# Patient Record
Sex: FEMALE | Race: WHITE | Employment: OTHER | URBAN - METROPOLITAN AREA
[De-identification: names, ages, dates, MRNs, and addresses within clinical notes are randomized per-mention and may not be internally consistent; named-entity substitution may affect disease eponyms.]

---

## 2017-01-06 RX ORDER — GALANTAMINE HYDROBROMIDE 8 MG/1
TABLET, FILM COATED ORAL
Qty: 180 TABLET | Refills: 1 | Status: SHIPPED | OUTPATIENT
Start: 2017-01-06 | End: 2017-07-19

## 2017-01-06 NOTE — TELEPHONE ENCOUNTER
LOV 5/2/16  LL 4/21/16  LAST RX 5/9/16   PLACED ORDER FOR #180 + 1 PER PROTOCOL.    PT WILL BE DUE FOR [address] IN MAY 2017

## 2017-01-17 RX ORDER — ERGOCALCIFEROL 1.25 MG/1
CAPSULE ORAL
Qty: 20 CAPSULE | Refills: 0 | Status: SHIPPED | OUTPATIENT
Start: 2017-01-17 | End: 2017-06-29

## 2017-01-17 NOTE — TELEPHONE ENCOUNTER
LOV 5/2/16  LL 4/21/16 Vit D  Last Rx 9/29/16 #12 + 0   Placed order for #20 + 0 to get pt to due date for next labs.

## 2017-01-20 ENCOUNTER — MED REC SCAN ONLY (OUTPATIENT)
Dept: INTERNAL MEDICINE CLINIC | Facility: CLINIC | Age: 82
End: 2017-01-20

## 2017-04-05 ENCOUNTER — TELEPHONE (OUTPATIENT)
Dept: INTERNAL MEDICINE CLINIC | Facility: CLINIC | Age: 82
End: 2017-04-05

## 2017-05-05 ENCOUNTER — TELEPHONE (OUTPATIENT)
Dept: INTERNAL MEDICINE CLINIC | Facility: CLINIC | Age: 82
End: 2017-05-05

## 2017-06-30 RX ORDER — ERGOCALCIFEROL 1.25 MG/1
CAPSULE ORAL
Qty: 20 CAPSULE | Refills: 0 | Status: SHIPPED | OUTPATIENT
Start: 2017-06-30 | End: 2017-08-11

## 2017-07-19 ENCOUNTER — NURSE ONLY (OUTPATIENT)
Dept: INTERNAL MEDICINE CLINIC | Facility: CLINIC | Age: 82
End: 2017-07-19

## 2017-07-19 ENCOUNTER — APPOINTMENT (OUTPATIENT)
Dept: GENERAL RADIOLOGY | Facility: HOSPITAL | Age: 82
End: 2017-07-19
Attending: EMERGENCY MEDICINE
Payer: MEDICARE

## 2017-07-19 ENCOUNTER — HOSPITAL ENCOUNTER (EMERGENCY)
Facility: HOSPITAL | Age: 82
Discharge: HOME | End: 2017-07-19
Attending: EMERGENCY MEDICINE
Payer: MEDICARE

## 2017-07-19 ENCOUNTER — OFFICE VISIT (OUTPATIENT)
Dept: INTERNAL MEDICINE CLINIC | Facility: CLINIC | Age: 82
End: 2017-07-19

## 2017-07-19 VITALS
RESPIRATION RATE: 14 BRPM | SYSTOLIC BLOOD PRESSURE: 148 MMHG | OXYGEN SATURATION: 98 % | WEIGHT: 156 LBS | BODY MASS INDEX: 27 KG/M2 | HEART RATE: 76 BPM | DIASTOLIC BLOOD PRESSURE: 80 MMHG | TEMPERATURE: 98 F

## 2017-07-19 VITALS
BODY MASS INDEX: 26.98 KG/M2 | OXYGEN SATURATION: 96 % | RESPIRATION RATE: 18 BRPM | HEART RATE: 64 BPM | WEIGHT: 158 LBS | TEMPERATURE: 97 F | HEIGHT: 64 IN | DIASTOLIC BLOOD PRESSURE: 64 MMHG | SYSTOLIC BLOOD PRESSURE: 118 MMHG

## 2017-07-19 DIAGNOSIS — Z00.01 ENCOUNTER FOR GENERAL ADULT MEDICAL EXAMINATION WITH ABNORMAL FINDINGS: Primary | ICD-10-CM

## 2017-07-19 DIAGNOSIS — R55 SYNCOPE, VASOVAGAL: Primary | ICD-10-CM

## 2017-07-19 DIAGNOSIS — Z00.00 LABORATORY EXAMINATION ORDERED AS PART OF A ROUTINE GENERAL MEDICAL EXAMINATION: Primary | ICD-10-CM

## 2017-07-19 PROBLEM — S82.90XA LEG FRACTURE: Status: RESOLVED | Noted: 2017-07-19 | Resolved: 2017-07-19

## 2017-07-19 LAB
ALBUMIN SERPL-MCNC: 3.4 G/DL (ref 3.5–4.8)
ALP LIVER SERPL-CCNC: 74 U/L (ref 55–142)
ALT SERPL-CCNC: 16 U/L (ref 14–54)
AST SERPL-CCNC: 16 U/L (ref 15–41)
ATRIAL RATE: 61 BPM
BASOPHILS # BLD AUTO: 0.04 X10(3) UL (ref 0–0.1)
BASOPHILS NFR BLD AUTO: 0.7 %
BILIRUB SERPL-MCNC: 0.5 MG/DL (ref 0.1–2)
BILIRUB UR QL STRIP.AUTO: NEGATIVE
BUN BLD-MCNC: 6 MG/DL (ref 8–20)
CALCIUM BLD-MCNC: 8.5 MG/DL (ref 8.3–10.3)
CHLORIDE: 106 MMOL/L (ref 101–111)
CLARITY UR REFRACT.AUTO: CLEAR
CO2: 21 MMOL/L (ref 22–32)
COLOR UR AUTO: YELLOW
CREAT BLD-MCNC: 0.69 MG/DL (ref 0.55–1.02)
EOSINOPHIL # BLD AUTO: 0.11 X10(3) UL (ref 0–0.3)
EOSINOPHIL NFR BLD AUTO: 1.8 %
ERYTHROCYTE [DISTWIDTH] IN BLOOD BY AUTOMATED COUNT: 13.2 % (ref 11.5–16)
GLUCOSE BLD-MCNC: 130 MG/DL (ref 70–99)
GLUCOSE UR STRIP.AUTO-MCNC: NEGATIVE MG/DL
HCT VFR BLD AUTO: 40 % (ref 34–50)
HGB BLD-MCNC: 13.3 G/DL (ref 12–16)
IMMATURE GRANULOCYTE COUNT: 0.02 X10(3) UL (ref 0–1)
IMMATURE GRANULOCYTE RATIO %: 0.3 %
KETONES UR STRIP.AUTO-MCNC: NEGATIVE MG/DL
LYMPHOCYTES # BLD AUTO: 1.84 X10(3) UL (ref 0.9–4)
LYMPHOCYTES NFR BLD AUTO: 30.9 %
M PROTEIN MFR SERPL ELPH: 6.1 G/DL (ref 6.1–8.3)
MCH RBC QN AUTO: 29.9 PG (ref 27–33.2)
MCHC RBC AUTO-ENTMCNC: 33.3 G/DL (ref 31–37)
MCV RBC AUTO: 89.9 FL (ref 81–100)
MONOCYTES # BLD AUTO: 0.49 X10(3) UL (ref 0.1–0.6)
MONOCYTES NFR BLD AUTO: 8.2 %
NEUTROPHIL ABS PRELIM: 3.46 X10 (3) UL (ref 1.3–6.7)
NEUTROPHILS # BLD AUTO: 3.46 X10(3) UL (ref 1.3–6.7)
NEUTROPHILS NFR BLD AUTO: 58.1 %
NITRITE UR QL STRIP.AUTO: NEGATIVE
P AXIS: 84 DEGREES
P-R INTERVAL: 260 MS
PH UR STRIP.AUTO: 7 [PH] (ref 4.5–8)
PLATELET # BLD AUTO: 273 10(3)UL (ref 150–450)
POTASSIUM SERPL-SCNC: 4 MMOL/L (ref 3.6–5.1)
PROT UR STRIP.AUTO-MCNC: NEGATIVE MG/DL
Q-T INTERVAL: 468 MS
QRS DURATION: 108 MS
QTC CALCULATION (BEZET): 471 MS
R AXIS: -33 DEGREES
RBC # BLD AUTO: 4.45 X10(6)UL (ref 3.8–5.1)
RBC UR QL AUTO: NEGATIVE
RED CELL DISTRIBUTION WIDTH-SD: 43 FL (ref 35.1–46.3)
SODIUM SERPL-SCNC: 138 MMOL/L (ref 136–144)
SP GR UR STRIP.AUTO: 1.01 (ref 1–1.03)
T AXIS: 118 DEGREES
TROPONIN: <0.046 NG/ML (ref ?–0.05)
UROBILINOGEN UR STRIP.AUTO-MCNC: <2 MG/DL
VENTRICULAR RATE: 61 BPM
WBC # BLD AUTO: 6 X10(3) UL (ref 4–13)

## 2017-07-19 PROCEDURE — 93000 ELECTROCARDIOGRAM COMPLETE: CPT | Performed by: INTERNAL MEDICINE

## 2017-07-19 PROCEDURE — 94010 BREATHING CAPACITY TEST: CPT | Performed by: INTERNAL MEDICINE

## 2017-07-19 PROCEDURE — 99285 EMERGENCY DEPT VISIT HI MDM: CPT

## 2017-07-19 PROCEDURE — 93005 ELECTROCARDIOGRAM TRACING: CPT

## 2017-07-19 PROCEDURE — 80053 COMPREHEN METABOLIC PANEL: CPT | Performed by: EMERGENCY MEDICINE

## 2017-07-19 PROCEDURE — G0439 PPPS, SUBSEQ VISIT: HCPCS | Performed by: INTERNAL MEDICINE

## 2017-07-19 PROCEDURE — 85025 COMPLETE CBC W/AUTO DIFF WBC: CPT | Performed by: EMERGENCY MEDICINE

## 2017-07-19 PROCEDURE — 71010 XR CHEST AP PORTABLE  (CPT=71010): CPT | Performed by: EMERGENCY MEDICINE

## 2017-07-19 PROCEDURE — 36415 COLL VENOUS BLD VENIPUNCTURE: CPT

## 2017-07-19 PROCEDURE — 84484 ASSAY OF TROPONIN QUANT: CPT | Performed by: EMERGENCY MEDICINE

## 2017-07-19 PROCEDURE — 92551 PURE TONE HEARING TEST AIR: CPT | Performed by: INTERNAL MEDICINE

## 2017-07-19 PROCEDURE — 81001 URINALYSIS AUTO W/SCOPE: CPT | Performed by: INTERNAL MEDICINE

## 2017-07-19 PROCEDURE — 93010 ELECTROCARDIOGRAM REPORT: CPT

## 2017-07-19 PROCEDURE — 99173 VISUAL ACUITY SCREEN: CPT | Performed by: INTERNAL MEDICINE

## 2017-07-19 RX ORDER — ASPIRIN 81 MG
TABLET,CHEWABLE ORAL
Qty: 42.5 G | Refills: 0 | Status: ON HOLD | COMMUNITY
Start: 2017-07-19 | End: 2018-09-07

## 2017-07-19 RX ORDER — GALANTAMINE HYDROBROMIDE 16 MG/1
16 CAPSULE, EXTENDED RELEASE ORAL
Qty: 90 CAPSULE | Refills: 3 | Status: SHIPPED | OUTPATIENT
Start: 2017-07-19 | End: 2017-08-11 | Stop reason: DRUGHIGH

## 2017-07-19 RX ORDER — MEMANTINE HYDROCHLORIDE 5 MG/1
5 TABLET ORAL 2 TIMES DAILY
Qty: 180 TABLET | Refills: 3 | Status: SHIPPED | OUTPATIENT
Start: 2017-07-19 | End: 2017-07-19

## 2017-07-19 RX ORDER — GALANTAMINE HYDROBROMIDE 24 MG/1
24 CAPSULE, EXTENDED RELEASE ORAL
Qty: 90 CAPSULE | Refills: 3 | Status: SHIPPED | OUTPATIENT
Start: 2017-07-19 | End: 2017-07-19

## 2017-07-19 NOTE — ED INITIAL ASSESSMENT (HPI)
PT TO ER VIA EMS FOR NEAR-SYNCOPAL EPISODE WHILE SHE WAS AT [facility] WITH HER DAUGHTER. UPON EMS ARRIVAL PT WAS DISORIENTED AND HYPOTENSIVE 64/ 48. 400 CC BOLUS WAS GIVEN EN-ROUTE. PT IS ALERT UPON ARRIVAL.

## 2017-07-19 NOTE — PROGRESS NOTES
*LOOKIN' BODY RESULTS    YES:       NO: X      REASON TEST NOT PERFORMED: WAIVED DUE TO BALANCE       HEIGHT:   WEIGHT: 156  _____________________________________________________________________________  *VENIPUNCTURE    YES:  X     NO:        REASON VENIP

## 2017-07-19 NOTE — PROGRESS NOTES
HPI:    Patient ID: Kit Ring is a 80year old female her for annual 7-19-17:    Here for annual, declining mental status. Review of Systems   HENT: Negative. Eyes: Positive for visual disturbance (cataracts).         Macular degeneration Comment:Elevated blood pressure  Ultram [Tramadol]           Comment:confusion            PHYSICAL EXAM:   Physical Exam   Constitutional: No distress.    HENT:   neg   Eyes: Conjunctivae are normal. Pupils are equal, round, and reactive resolved   • Macular degeneration    • OA (osteoarthritis) of knee 3/27/12    bilat, severe   • Onychomycosis 6/28/11   • Post herpetic neuralgia     left scapula   • Prediabetes    • Rotator cuff tear, right 1/2010    OR   • Temporal lobe seizure ([facility] [address] 8/ Size: adult    Pulse: 76    Resp: 14    Temp: 97.8 °F (36.6 °C)    TempSrc: Oral    SpO2: 98%    Weight: 156 lb      Body mass index is 26.78 kg/m².         Health Screens      Anxiety Index: No major anxiety evident  Depression Index: No major depression e changes may be due to myocardial ischemia     HEARING: Waived     SPIROMETRY:               ASSESSMENT/PLAN:   Encounter for general adult medical examination with abnormal findings  (primary encounter diagnosis)    See summary below:    Meds This Visit: surgery in 2010. Generalized arthritis, especially in the knees. Needs one person assist. Hold off on PT.     CONCLUSIONS:  Dayan Cheri you are in decent shape at age 80. Stable cardiovascular disease, Alzheimer dementia, and musculoskeletal problems.  Please co

## 2017-07-19 NOTE — ED NOTES
Pt reevaluated by er physician, pt and daughter both informed of pt's test report and plan of care. All verbalizing understanding.

## 2017-07-19 NOTE — ED PROVIDER NOTES
Patient Seen in: [facility] Emergency Department    History   Patient presents with:  Syncope (cardiovascular, neurologic)  Hypotension (cardiovascular)    Stated Complaint: HYPOTENSION/ NEAR-SYNCOPE    HPI    40-year-old white female who presents e B 12 deficiency    • Vitamin D deficiency        Past Surgical History:  : ANGIOPLASTY (CORONARY)      Comment: stent placement, 90% blockage  : COLONOSCOPY  approx 27 yrs old: D & C      Comment: missed   2015: HIP SURGERY      Comment: Device: None (Room air)    Current:/64   Pulse 64   Temp (!) 96.8 °F (36 °C) (Temporal)   Resp 18   Ht 162.6 cm (5' 4\")   Wt 71.7 kg   SpO2 96%   BMI 27.12 kg/m²         Physical Exam    Well-developed well-nourished female who is sitting on the gur T-wave changes are noted. Slightly prolonged QT interval was noted. Chest x-ray reveals:  FINDINGS:  Lung volumes are large. No consolidation. CP angles are sharp. There is mild cardiomegaly allowing for AP portable technique.  Tortuosity and ectas

## 2017-07-19 NOTE — ED NOTES
Pt's daughter states pt had a urinalysis testings done at the doctor's office this morning and hesitant to repeat the test.dr. Justin Ruvalcaba aware.

## 2017-07-24 ENCOUNTER — MED REC SCAN ONLY (OUTPATIENT)
Dept: INTERNAL MEDICINE CLINIC | Facility: CLINIC | Age: 82
End: 2017-07-24

## 2017-07-31 ENCOUNTER — TELEPHONE (OUTPATIENT)
Dept: INTERNAL MEDICINE CLINIC | Facility: CLINIC | Age: 82
End: 2017-07-31

## 2017-07-31 NOTE — TELEPHONE ENCOUNTER
Rec'd fax documentation from Dr. Andie Beauchamp stating that pt was admitted to hospital from ED for \"esophageal perforation, choking\". \"Emergent EGD by Dr. Michael Agustin completed. Pt intubated for procedure. Esophageal perforation discovered.  Dr. Michael Agustin called ENT

## 2017-08-02 ENCOUNTER — TELEPHONE (OUTPATIENT)
Dept: INTERNAL MEDICINE CLINIC | Facility: CLINIC | Age: 82
End: 2017-08-02

## 2017-08-09 ENCOUNTER — TELEPHONE (OUTPATIENT)
Dept: INTERNAL MEDICINE CLINIC | Facility: CLINIC | Age: 82
End: 2017-08-09

## 2017-08-11 ENCOUNTER — OFFICE VISIT (OUTPATIENT)
Dept: INTERNAL MEDICINE CLINIC | Facility: CLINIC | Age: 82
End: 2017-08-11

## 2017-08-11 VITALS
DIASTOLIC BLOOD PRESSURE: 85 MMHG | RESPIRATION RATE: 14 BRPM | TEMPERATURE: 98 F | OXYGEN SATURATION: 96 % | HEART RATE: 93 BPM | SYSTOLIC BLOOD PRESSURE: 135 MMHG

## 2017-08-11 DIAGNOSIS — Z87.09 HISTORY OF ACUTE RESPIRATORY FAILURE: ICD-10-CM

## 2017-08-11 DIAGNOSIS — F02.80 LATE ONSET ALZHEIMER'S DISEASE WITHOUT BEHAVIORAL DISTURBANCE (HCC): ICD-10-CM

## 2017-08-11 DIAGNOSIS — T18.108S: ICD-10-CM

## 2017-08-11 DIAGNOSIS — G30.1 LATE ONSET ALZHEIMER'S DISEASE WITHOUT BEHAVIORAL DISTURBANCE (HCC): ICD-10-CM

## 2017-08-11 DIAGNOSIS — K22.3 ESOPHAGEAL PERFORATION: Primary | ICD-10-CM

## 2017-08-11 PROCEDURE — 99214 OFFICE O/P EST MOD 30 MIN: CPT | Performed by: INTERNAL MEDICINE

## 2017-08-11 RX ORDER — ERGOCALCIFEROL (VITAMIN D2) 1250 MCG
50000 CAPSULE ORAL WEEKLY
COMMUNITY
Start: 2011-04-26 | End: 2018-01-08

## 2017-08-11 RX ORDER — GALANTAMINE HYDROBROMIDE 24 MG/1
CAPSULE, EXTENDED RELEASE ORAL
Refills: 3 | COMMUNITY
Start: 2017-07-28 | End: 2018-10-14

## 2017-08-11 RX ORDER — GALANTAMINE HYDROBROMIDE 12 MG/1
24 TABLET, FILM COATED ORAL
COMMUNITY
End: 2017-08-11 | Stop reason: DRUGHIGH

## 2017-08-13 PROBLEM — K22.3 ESOPHAGEAL PERFORATION: Status: ACTIVE | Noted: 2017-08-13

## 2017-08-13 PROBLEM — T18.108A ESOPHAGEAL FOREIGN BODY: Status: ACTIVE | Noted: 2017-08-13

## 2017-08-13 PROBLEM — Z87.09 HISTORY OF ACUTE RESPIRATORY FAILURE: Status: ACTIVE | Noted: 2017-08-13

## 2017-08-13 RX ORDER — OMEPRAZOLE 20 MG/1
20 CAPSULE, DELAYED RELEASE ORAL
Refills: 0 | COMMUNITY
Start: 2017-08-13 | End: 2017-12-04

## 2017-08-13 NOTE — PROGRESS NOTES
HPI:    Patient ID: Olga Ellison is a 80year old female baseline AD brought to [facility] ER 7-29 with esoph foreign body (food), EGD done esoph perf noted and sent to ICU on vent. Transferred to [facility] around 7/30 and discharged 8/6     See CC.  Plan rate, regular rhythm and normal heart sounds. Pulmonary/Chest: Breath sounds normal. No respiratory distress. Musculoskeletal: She exhibits no edema or tenderness. Neurological: She is alert.    Back to baseline confusional state-[employer]

## 2017-08-14 ENCOUNTER — MED REC SCAN ONLY (OUTPATIENT)
Dept: INTERNAL MEDICINE CLINIC | Facility: CLINIC | Age: 82
End: 2017-08-14

## 2017-11-02 ENCOUNTER — TELEPHONE (OUTPATIENT)
Dept: INTERNAL MEDICINE CLINIC | Facility: CLINIC | Age: 82
End: 2017-11-02

## 2017-11-02 NOTE — TELEPHONE ENCOUNTER
Call to dtr Miguelito Torres regarding medical clearance for Opth procedure in [facility] on 11/17/17 (30 min MAC lower lid senile entropion). Dtr does not think she can bring mom in prior to 11/17 for OV?      We have CPE and EKG from July, 2017 and hospital f/u from 8

## 2017-11-03 NOTE — TELEPHONE ENCOUNTER
Sent fax to Dr Remedios Rojas office telling them we have not rec'd a call yet and we need to know if July dates are acceptable to use for clearance. No one has called back.

## 2017-11-06 ENCOUNTER — HOSPITAL ENCOUNTER (OUTPATIENT)
Dept: GENERAL RADIOLOGY | Facility: HOSPITAL | Age: 82
Discharge: HOME | DRG: 516 | End: 2017-11-06
Attending: INTERNAL MEDICINE
Payer: MEDICARE

## 2017-11-06 ENCOUNTER — OFFICE VISIT (OUTPATIENT)
Dept: INTERNAL MEDICINE CLINIC | Facility: CLINIC | Age: 82
End: 2017-11-06

## 2017-11-06 ENCOUNTER — HOSPITAL ENCOUNTER (OUTPATIENT)
Dept: GENERAL RADIOLOGY | Facility: HOSPITAL | Age: 82
DRG: 516 | End: 2017-11-06
Attending: INTERNAL MEDICINE
Payer: MEDICARE

## 2017-11-06 DIAGNOSIS — R06.02 SOB (SHORTNESS OF BREATH): ICD-10-CM

## 2017-11-06 DIAGNOSIS — F02.80 LATE ONSET ALZHEIMER'S DISEASE WITHOUT BEHAVIORAL DISTURBANCE (HCC): ICD-10-CM

## 2017-11-06 DIAGNOSIS — M54.5 LOW BACK PAIN, UNSPECIFIED BACK PAIN LATERALITY, UNSPECIFIED CHRONICITY, WITH SCIATICA PRESENCE UNSPECIFIED: ICD-10-CM

## 2017-11-06 DIAGNOSIS — M54.5 LOW BACK PAIN, UNSPECIFIED BACK PAIN LATERALITY, UNSPECIFIED CHRONICITY, WITH SCIATICA PRESENCE UNSPECIFIED: Primary | ICD-10-CM

## 2017-11-06 DIAGNOSIS — G30.1 LATE ONSET ALZHEIMER'S DISEASE WITHOUT BEHAVIORAL DISTURBANCE (HCC): ICD-10-CM

## 2017-11-06 PROCEDURE — 71020 XR CHEST PA + LAT CHEST (CPT=71020): CPT | Performed by: INTERNAL MEDICINE

## 2017-11-06 PROCEDURE — 72114 X-RAY EXAM L-S SPINE BENDING: CPT | Performed by: INTERNAL MEDICINE

## 2017-11-06 PROCEDURE — 99214 OFFICE O/P EST MOD 30 MIN: CPT | Performed by: INTERNAL MEDICINE

## 2017-11-06 NOTE — TELEPHONE ENCOUNTER
Spoke to [facility] at Dr. Anahi House office. She says EKG from July will be fine, but H&P must be dated within 2 weeks of surgery scheduled for 11/17.   Informed Luz patient has appointment today and we will fax over [phone]

## 2017-11-07 ENCOUNTER — APPOINTMENT (OUTPATIENT)
Dept: CT IMAGING | Facility: HOSPITAL | Age: 82
DRG: 516 | End: 2017-11-07
Attending: INTERNAL MEDICINE
Payer: MEDICARE

## 2017-11-07 ENCOUNTER — TELEPHONE (OUTPATIENT)
Dept: INTERNAL MEDICINE CLINIC | Facility: CLINIC | Age: 82
End: 2017-11-07

## 2017-11-07 ENCOUNTER — HOSPITAL ENCOUNTER (INPATIENT)
Facility: HOSPITAL | Age: 82
LOS: 2 days | Discharge: HOME HEALTH | DRG: 516 | End: 2017-11-11
Attending: INTERNAL MEDICINE | Admitting: INTERNAL MEDICINE
Payer: MEDICARE

## 2017-11-07 VITALS
HEART RATE: 80 BPM | HEIGHT: 64 IN | WEIGHT: 155 LBS | SYSTOLIC BLOOD PRESSURE: 140 MMHG | BODY MASS INDEX: 26.46 KG/M2 | DIASTOLIC BLOOD PRESSURE: 80 MMHG | OXYGEN SATURATION: 98 % | RESPIRATION RATE: 14 BRPM | TEMPERATURE: 98 F

## 2017-11-07 PROBLEM — M54.50 ACUTE RIGHT-SIDED LOW BACK PAIN WITHOUT SCIATICA: Status: ACTIVE | Noted: 2017-11-07

## 2017-11-07 PROCEDURE — 99223 1ST HOSP IP/OBS HIGH 75: CPT | Performed by: INTERNAL MEDICINE

## 2017-11-07 PROCEDURE — 72131 CT LUMBAR SPINE W/O DYE: CPT | Performed by: INTERNAL MEDICINE

## 2017-11-07 RX ORDER — ASPIRIN 81 MG/1
81 TABLET, CHEWABLE ORAL DAILY
Status: DISCONTINUED | OUTPATIENT
Start: 2017-11-07 | End: 2017-11-11

## 2017-11-07 RX ORDER — PANTOPRAZOLE SODIUM 20 MG/1
20 TABLET, DELAYED RELEASE ORAL
Status: DISCONTINUED | OUTPATIENT
Start: 2017-11-08 | End: 2017-11-11

## 2017-11-07 RX ORDER — CALCIUM CARBONATE 200(500)MG
1 TABLET,CHEWABLE ORAL 2 TIMES DAILY
Qty: 100 TABLET | Refills: 0 | Status: ON HOLD | COMMUNITY
Start: 2017-11-07 | End: 2018-09-07

## 2017-11-07 RX ORDER — ALPRAZOLAM 0.25 MG/1
0.25 TABLET ORAL 4 TIMES DAILY PRN
Status: DISCONTINUED | OUTPATIENT
Start: 2017-11-07 | End: 2017-11-11

## 2017-11-07 RX ORDER — MELATONIN
1 DAILY
Qty: 30 TABLET | Refills: 0 | COMMUNITY
Start: 2017-11-07 | End: 2017-12-07

## 2017-11-07 RX ORDER — ACETAMINOPHEN 500 MG
500 TABLET ORAL
Status: DISCONTINUED | OUTPATIENT
Start: 2017-11-07 | End: 2017-11-11

## 2017-11-07 RX ORDER — AMLODIPINE BESYLATE 2.5 MG/1
2.5 TABLET ORAL DAILY
Status: DISCONTINUED | OUTPATIENT
Start: 2017-11-07 | End: 2017-11-11

## 2017-11-07 NOTE — PROGRESS NOTES
HPI:    Patient ID: Roxanna Cisneros is a 80year old female here for preop left lower eyelid AND acute LBP for 1 week, no fall or trauma    Preop left lower lid ok but LBP severe and may have to wait. Past hx OA and OP. AD complicates.         Review of S distressed (back pain). Eyes:   Eyes little red   Cardiovascular: Normal rate and regular rhythm. Pulmonary/Chest: Breath sounds normal. No respiratory distress. Musculoskeletal: She exhibits tenderness (lumbar).    kyphosis   Neurological:   Baselin

## 2017-11-07 NOTE — TELEPHONE ENCOUNTER
Donzetta Mortimer (son in law) says things are worse than they thought with Yoseph Harper. They can barely get her in the wheelchair to go to the bathroom, which is on a different level.   Family is thinking she may need to go to a nursing home until her back gets jackelin

## 2017-11-07 NOTE — TELEPHONE ENCOUNTER
Clayton Justen is calling to report Cody George cannot get out of bed, has been having accidents because she can't get up in time. Clayton Dukamala is thinking maybe she needs to have PT? Clayton Campuzano is worried because she doesn't think she'll be able to move her by herself.   Please ca

## 2017-11-07 NOTE — TELEPHONE ENCOUNTER
Per Dr Nemesio Valero - send Dr. Bandar Vickers office fax letting them know surgery needs to be cancelled at this time d/t pt hospital admit. Notified Dr Bandar Vickers office and also sending over fax.

## 2017-11-08 ENCOUNTER — APPOINTMENT (OUTPATIENT)
Dept: NUCLEAR MEDICINE | Facility: HOSPITAL | Age: 82
DRG: 516 | End: 2017-11-08
Attending: INTERNAL MEDICINE
Payer: MEDICARE

## 2017-11-08 PROBLEM — M54.50 LUMBAR PAIN: Status: ACTIVE | Noted: 2017-11-08

## 2017-11-08 PROCEDURE — 99232 SBSQ HOSP IP/OBS MODERATE 35: CPT | Performed by: INTERNAL MEDICINE

## 2017-11-08 PROCEDURE — 78306 BONE IMAGING WHOLE BODY: CPT | Performed by: INTERNAL MEDICINE

## 2017-11-08 PROCEDURE — 78320 NM BONE SPECT WITH CT (CPT=78306/78320/78399): CPT | Performed by: INTERNAL MEDICINE

## 2017-11-08 PROCEDURE — 78399 UNLISTED MUSCSKEL PX DX NUC: CPT | Performed by: INTERNAL MEDICINE

## 2017-11-08 NOTE — CM/SW NOTE
PT informed SW intern that pt needs 24 hour assist.  SW called pt's daughter Kuldeep Das and informed her. SW left private duty care giver list in pt's room with Gina's name on it.    Pako Chawla, 11/08/17, 1:35 PM

## 2017-11-08 NOTE — H&P
[facility]    PATIENT'S NAME: Ricky Michelle   ATTENDING PHYSICIAN: Gil Damon M.D.    PATIENT ACCOUNT#:   [de-identified]    LOCATION:  [address] [place] [address]  MEDICAL RECORD #:   [id number]       YOB: 1930  ADMISSION DATE:       11/07/2017 son.    SOCIAL HISTORY:  No alcohol or smoking. REVIEW OF SYSTEMS:  HEENT: Pleasantly confused from Alzheimer's. No behavioral problems. EYES: Cataracts and macular degeneration. RESPIRATORY: Some shortness of breath. Negative chest x-ray.   Astrid Fulton reveal severe osteoporosis and some possible old compression fractures. She has been vitamin D. We recently started magnesium and calcium. Family is opposed to any other aggressive treatment. PLAN:  Tylenol. Avoid Ultram and opiates.   Avoid NSAIDs f

## 2017-11-08 NOTE — PHYSICAL THERAPY NOTE
PHYSICAL THERAPY EVALUATION - INPATIENT     Room Number: 366/366-A  Evaluation Date: 11/8/2017  Type of Evaluation: Initial  Physician Order: PT Eval and Treat    Presenting Problem: back pain  Reason for Therapy: Mobility Dysfunction and Discharge Natalia missed   2015: HIP SURGERY      Comment: right  2015: HUMERUS FRACTURE SURGERY      Comment: right  : LUMPECTOMY LEFT      Comment: and tiny one was cancer in situ  2010: REPAIR ROTATOR CUFF,ACUTE      Comment: R    HOME SITUATION  Type o assistance required to generate solutions and assistance required to implement solutions    RANGE OF MOTION AND STRENGTH ASSESSMENT  Upper extremity ROM and strength are within functional limits     Lower extremity ROM is within functional limits     Lower very pleasant, but confused, unable to orient patient to year of place despite mult attempts. Pt able to follow simple commands, however, it appears she is unable to retain new information, at least during PT session.   Pt transferred supine to sit with umanzor dtr, pt has this when she lives with dtr. DISCHARGE RECOMMENDATIONS  PT Discharge Recommendations: 24 hour care/supervision    PLAN  PT Treatment Plan: Bed mobility; Body mechanics; Endurance; Patient education;Gait training;Strengthening;Transfer training;

## 2017-11-08 NOTE — PROGRESS NOTES
[facility]    Progress Note    Phoenix Landers Patient Status:  Observation    1930 MRN [id number]   [facility] 3SW-A Attending Cleo Jin MD   Hosp Day # 0 PCP Edda Govea MD     Subjective:     Constitutional:        Seen ALT 15 11/08/2017   TSH 2.130 04/21/2016   ESRML 13 11/08/2017   TROP <0.046 07/19/2017   B12 888 04/21/2016       Xr Chest Pa + Lat Chest (nur=[id number])    Result Date: 11/6/2017  CONCLUSION:  Overall stable appearance of the chest. No focal consolidation.

## 2017-11-08 NOTE — CM/SW NOTE
11/08/17 1000   CM/SW Referral Data   Referral Source Physician   Reason for Referral Discharge planning   Informant Patient   Pertinent Medical Hx   Primary Care Physician Name Madhu Glover)   Patient Info   Patient's Mental Status Alert;Oriented   Kimberlyn Romero

## 2017-11-08 NOTE — CM/SW NOTE
SW intern called pt's daughter Deepika Weber who states that her mother has Alzheimer's Disease. Dtr states that Rianna Felix lives with her for part of the year and her brother, Radha Dickey son, for the rest of the year.   Pt has 24 hour assist when she is living with da

## 2017-11-08 NOTE — PLAN OF CARE
Returned from bone scan. Alert to self, date, time. Disoriented to place, surroundings. Reorientation attempted, however, unsuccessful. Vss. Denies pain. Sitting in chair, watching television. Denies pain. Safety precautions in place.   Cont to close

## 2017-11-09 ENCOUNTER — APPOINTMENT (OUTPATIENT)
Dept: MRI IMAGING | Facility: HOSPITAL | Age: 82
DRG: 516 | End: 2017-11-09
Attending: INTERNAL MEDICINE
Payer: MEDICARE

## 2017-11-09 PROBLEM — C79.51 BREAST CANCER METASTASIZED TO BONE (HCC): Status: ACTIVE | Noted: 2017-11-09

## 2017-11-09 PROBLEM — M54.9 ACUTE BACK PAIN: Status: ACTIVE | Noted: 2017-11-09

## 2017-11-09 PROBLEM — C50.919 BREAST CANCER METASTASIZED TO BONE (HCC): Status: ACTIVE | Noted: 2017-11-09

## 2017-11-09 PROCEDURE — 72158 MRI LUMBAR SPINE W/O & W/DYE: CPT | Performed by: INTERNAL MEDICINE

## 2017-11-09 NOTE — PROGRESS NOTES
Bone density read as mets. Had lumpectomy, RT, Tamoxifen about 10yrs ago elsewhere. Spoke to [address] who agreed witth onc consult and MRI.  If confirms CA expect palliative RT to start in hospital and cont as OP PLUS hormonal.

## 2017-11-09 NOTE — PLAN OF CARE
DISCHARGE PLANNING - CASE MANAGEMENT    • Discharge to post-acute care or home with appropriate resources Progressing        Impaired Functional Mobility    • Achieve highest/safest level of mobility/gait Progressing        PAIN - ADULT    • Verbalizes/dis

## 2017-11-09 NOTE — CONSULTS
[facility]    PATIENT'S NAME: Valeri Sterling   ATTENDING PHYSICIAN: ZULAY Landerosedee Medin: Ila Alaniz M.D.    PATIENT ACCOUNT#:   [de-identified]    LOCATION:  3SW-A [facility]  MEDICAL RECORD #:   [id number]       DATE OF BI includes angioplasty in 1995 and left lumpectomy for breast cancer in 2002 at [address]:  She is  and has 5 children, 4 sons and 1 daughter. She lives with one of her sons and part-time with her daughter.   She does not s reveals a white count of 8500, hemoglobin 13.5, hematocrit 38.9, platelet count 535, MCV 87.4. Chemistry profile reveals a BUN of 7, creatinine 0.52, calcium 9.0, sodium 136, potassium 4, chloride 104. Alkaline phosphatase 72. SGOT 14, SGPT 15.   Total p excluded. RECOMMENDATIONS:    1. An MRI of the lumbar spine with and without gadolinium contrast has been ordered and we will await results of the same. This may shed further light on whether we are dealing with metastatic disease on not.   2.   CA 15

## 2017-11-10 ENCOUNTER — APPOINTMENT (OUTPATIENT)
Dept: INTERVENTIONAL RADIOLOGY/VASCULAR | Facility: HOSPITAL | Age: 82
DRG: 516 | End: 2017-11-10
Attending: INTERNAL MEDICINE
Payer: MEDICARE

## 2017-11-10 PROBLEM — S22.080A T12 COMPRESSION FRACTURE (HCC): Status: ACTIVE | Noted: 2017-11-10

## 2017-11-10 PROCEDURE — 0QS03ZZ REPOSITION LUMBAR VERTEBRA, PERCUTANEOUS APPROACH: ICD-10-PCS | Performed by: RADIOLOGY

## 2017-11-10 PROCEDURE — 0QU03JZ SUPPLEMENT LUMBAR VERTEBRA WITH SYNTHETIC SUBSTITUTE, PERCUTANEOUS APPROACH: ICD-10-PCS | Performed by: RADIOLOGY

## 2017-11-10 PROCEDURE — 99232 SBSQ HOSP IP/OBS MODERATE 35: CPT | Performed by: INTERNAL MEDICINE

## 2017-11-10 RX ORDER — MIDAZOLAM HYDROCHLORIDE 1 MG/ML
INJECTION INTRAMUSCULAR; INTRAVENOUS
Status: COMPLETED
Start: 2017-11-10 | End: 2017-11-10

## 2017-11-10 RX ORDER — LIDOCAINE HYDROCHLORIDE 10 MG/ML
INJECTION, SOLUTION INFILTRATION; PERINEURAL
Status: COMPLETED
Start: 2017-11-10 | End: 2017-11-10

## 2017-11-10 NOTE — PROGRESS NOTES
Seen Today post vertebroplasty-LBP better. Spoke to Josey Davis and radiologist Kristen Landry who reported MRI from yesterday as BENIGN. Spoke to  who did vertebroplasty today. Now with son in room pt resting comfortably.  Denies back pain-nothing o

## 2017-11-10 NOTE — PROGRESS NOTES
[facility]  Progress Note    Bernardino Peck Patient Status:  Inpatient    1930 MRN [id number]   [facility] 3SW-A Attending Janeth Zuniga MD   Hosp Day # 1 PCP Yecenia Denise MD     Chief complaint: Back pain    Objective:  Blood pr

## 2017-11-10 NOTE — PROGRESS NOTES
11/09/17 1812   Clinical Encounter Type   Visited With Patient and family together   Routine Visit Introduction   Continue Visiting No   Patient's Supportive Strategies/Resources ( assisted patient to identify available spiritual support systems

## 2017-11-10 NOTE — PROGRESS NOTES
Received pt back from IR. No c/o pain. Tegaderm site to left mid back c/d/i. Tolerating water. Will continue to monitor.

## 2017-11-10 NOTE — PHYSICAL THERAPY NOTE
PHYSICAL THERAPY TREATMENT NOTE - INPATIENT    Room Number: 366/366-A     Session: 1   Number of Visits to Meet Established Goals: 3    Presenting Problem: Back pain     Problem List  Principal Problem:    Acute right-sided low back pain without sciatica family present. Patient’s self-stated goal is to be able to walk without pain.     OBJECTIVE  Precautions: Bed/chair alarm    WEIGHT BEARING RESTRICTION  Weight Bearing Restriction: None                PAIN ASSESSMENT   Rating: Unable to rate  Location: supervision & able to manage 4 stairs x 2 with bilateral rail assist & CGA for safety. Patient was left up in bedside chair @ end of session.     THERAPEUTIC EXERCISES  Lower Extremity Alternating marching  Ankle pumps  LAQ     Upper Extremity Elbow flex/ext

## 2017-11-10 NOTE — PROGRESS NOTES
Spoke to IR regarding vertebroplasty. They will have Dr Tessy Virk review MRI results to see if pt a candidate. I asked them to have Dr Tessy Virk call Dr Jeremi Bryant in his office to discuss. Per Dr Jeremi Bryant, he will be in the office all day today.

## 2017-11-11 VITALS
TEMPERATURE: 98 F | DIASTOLIC BLOOD PRESSURE: 87 MMHG | HEART RATE: 95 BPM | RESPIRATION RATE: 20 BRPM | SYSTOLIC BLOOD PRESSURE: 143 MMHG | OXYGEN SATURATION: 95 %

## 2017-11-11 PROCEDURE — 99238 HOSP IP/OBS DSCHRG MGMT 30/<: CPT | Performed by: INTERNAL MEDICINE

## 2017-11-11 RX ORDER — MEMANTINE HYDROCHLORIDE 5 MG/1
5 TABLET ORAL 2 TIMES DAILY
Qty: 60 TABLET | Refills: 3 | Status: SHIPPED | OUTPATIENT
Start: 2017-11-11 | End: 2017-12-04

## 2017-11-11 RX ORDER — ACETAMINOPHEN 500 MG
500 TABLET ORAL
Qty: 100 TABLET | Refills: 0 | Status: ON HOLD | COMMUNITY
Start: 2017-11-11 | End: 2018-09-07

## 2017-11-11 RX ORDER — AMLODIPINE BESYLATE 2.5 MG/1
2.5 TABLET ORAL DAILY
Qty: 90 TABLET | Refills: 0 | Status: SHIPPED | OUTPATIENT
Start: 2017-11-12 | End: 2017-12-04

## 2017-11-11 NOTE — PLAN OF CARE
DISCHARGE PLANNING - CASE MANAGEMENT    • Discharge to post-acute care or home with appropriate resources Adequate for Discharge        Impaired Functional Mobility    • Achieve highest/safest level of mobility/gait Adequate for Discharge        PAIN - TEETEE

## 2017-11-11 NOTE — PROGRESS NOTES
[facility]  Progress Note    Zach Alex Patient Status:  Inpatient    1930 MRN [id number]   [facility] 3SW-A Attending Rhonda Bateman MD   Hosp Day # 2 PCP Conny Seip, MD     Chief complaint: She feels better overall.     Mary Lloyd Caitlin Cintron MD

## 2017-11-11 NOTE — CM/SW NOTE
юлия notified that Dr Mari Jacobs wants [facility] arranged for patient upon discharge. Order obtained and referral given to [facility] liaison.

## 2017-11-12 NOTE — DISCHARGE SUMMARY
[facility]    PATIENT'S NAME: [facility]   ATTENDING PHYSICIAN: Kristin Barajas M.D.    PATIENT ACCOUNT#:   [de-identified]    LOCATION:  [address] [place] [address]  MEDICAL RECORD #:   [id number]       YOB: 1930  ADMISSION DATE:       11/07/2017 suggesting no inflammatory or infectious disease present. DISPOSITION:  I would see her in the office on 11/17. I will be in touch with the daughter, Ligia Horowitz, as to her outpatient progress.     DISCHARGE MEDICATIONS:  Tylenol, aspirin 81, Tums, vitamin D

## 2017-11-13 ENCOUNTER — TELEPHONE (OUTPATIENT)
Dept: CASE MANAGEMENT | Age: 82
End: 2017-11-13

## 2017-11-13 ENCOUNTER — PATIENT OUTREACH (OUTPATIENT)
Dept: CASE MANAGEMENT | Age: 82
End: 2017-11-13

## 2017-11-13 DIAGNOSIS — Z02.9 ENCOUNTERS FOR ADMINISTRATIVE PURPOSE: ICD-10-CM

## 2017-11-13 NOTE — CM/SW NOTE
11/13/17 0800   Discharge disposition   Discharged to: Home-Health   Name of Facillity/Home Care/Hospice Residential   Discharge transportation Private car   DC 11/11/17

## 2017-11-13 NOTE — TELEPHONE ENCOUNTER
Contacted pt's daughter Giovany Bentley for [employer]. Pt currently does not have her TCM/HFU scheduled. Daughter stated she is willing to have the pt come in for an office visit. TCM/HFU is recommended by 11/25/17.  Please contact Giovany Bentley, the pt's daughter to try and sched

## 2017-11-13 NOTE — PROGRESS NOTES
Initial Post Discharge Follow Up   Discharge Date: 11/11/17  Contact Date: 11/13/2017    Consent Verification:  Assessment Completed With: Other: Jerrica Wolfe received per patient?  written  HIPAA Verified? Yes    Discharge Dx:      Low back pain s HCl 5 MG Oral Tab Take 1 tablet (5 mg total) by mouth 2 (two) times daily. Disp: 60 tablet Rfl: 3   Calcium Carbonate Antacid (TUMS) 500 MG Oral Chew Tab Chew 1 tablet (500 mg total) by mouth 2 (two) times daily.  Disp: 100 tablet Rfl: 0   Magnesium Oxide 4 Guy Tolentino TCMCOPDTEMP  CVA: .TCMCVATEMP  CV Surgery:  . TCMCVSURGERY  Pneumonia: . TCMPNEUMONIATEMP  Ortho/Spine:  TCMORTHOSPINETEMP  Re-admit:  . TCMREADMITTEMP    Ortho Spine:  Has there been a change in your incision/wound since d/c?  no  Were you able to participa Any changes or updates to medications and or orders sent to PCP.

## 2017-11-27 ENCOUNTER — MED REC SCAN ONLY (OUTPATIENT)
Dept: INTERNAL MEDICINE CLINIC | Facility: CLINIC | Age: 82
End: 2017-11-27

## 2017-12-04 ENCOUNTER — OFFICE VISIT (OUTPATIENT)
Dept: INTERNAL MEDICINE CLINIC | Facility: CLINIC | Age: 82
End: 2017-12-04

## 2017-12-04 VITALS
WEIGHT: 148 LBS | DIASTOLIC BLOOD PRESSURE: 80 MMHG | OXYGEN SATURATION: 96 % | HEART RATE: 70 BPM | HEIGHT: 64 IN | TEMPERATURE: 97 F | SYSTOLIC BLOOD PRESSURE: 140 MMHG | RESPIRATION RATE: 12 BRPM | BODY MASS INDEX: 25.27 KG/M2

## 2017-12-04 DIAGNOSIS — B02.29 POST HERPETIC NEURALGIA: ICD-10-CM

## 2017-12-04 DIAGNOSIS — G30.1 LATE ONSET ALZHEIMER'S DISEASE WITHOUT BEHAVIORAL DISTURBANCE (HCC): ICD-10-CM

## 2017-12-04 DIAGNOSIS — F02.80 LATE ONSET ALZHEIMER'S DISEASE WITHOUT BEHAVIORAL DISTURBANCE (HCC): ICD-10-CM

## 2017-12-04 DIAGNOSIS — S22.080A T12 COMPRESSION FRACTURE (HCC): Primary | ICD-10-CM

## 2017-12-04 PROCEDURE — 99214 OFFICE O/P EST MOD 30 MIN: CPT | Performed by: INTERNAL MEDICINE

## 2017-12-04 RX ORDER — MEMANTINE HYDROCHLORIDE 5 MG/1
5 TABLET ORAL 2 TIMES DAILY
Qty: 60 TABLET | Refills: 3 | Status: SHIPPED | OUTPATIENT
Start: 2017-12-04 | End: 2018-01-15

## 2017-12-04 NOTE — PROGRESS NOTES
HPI:    Patient ID: Sagar Farmer is a 80year old female fu T12 vertebroplasty by  for fx on 11-10    T12 compression fx treated 11/10-much better-hardly takes plain Tylenol.  AD same and severe-to start Namenda        Review of Systems   Constitu Nutritional Supplements (JUICE PLUS FIBRE OR) Take  by mouth. Disp:  Rfl:    TURMERIC OR daily.  Disp:  Rfl:      Allergies:  Nsaids                      Comment:Elevated blood pressure  Ultram [Tramadol]           Comment:confusion   PHYSICAL EXAM:   Phy

## 2018-01-08 RX ORDER — ERGOCALCIFEROL 1.25 MG/1
50000 CAPSULE ORAL
Qty: 20 CAPSULE | Refills: 0 | Status: SHIPPED | OUTPATIENT
Start: 2018-01-08 | End: 2018-10-13

## 2018-01-08 NOTE — TELEPHONE ENCOUNTER
LOV 12/4/17  Last Rx Historical only from 4/26/11    Pended for pt to take 1 capsule C5Unmyg  She has reported taking 1 PO Q2W and also 1 PO Qweekly. Which would you like?

## 2018-01-12 ENCOUNTER — TELEPHONE (OUTPATIENT)
Dept: INTERNAL MEDICINE CLINIC | Facility: CLINIC | Age: 83
End: 2018-01-12

## 2018-01-12 NOTE — TELEPHONE ENCOUNTER
Patient's daughter called to inform Dr. Erika Mazariegos the family discontinued Namenda yesterday due to side effects.   Daughter's cell phone is dead please call [phone]

## 2018-04-06 ENCOUNTER — TELEPHONE (OUTPATIENT)
Dept: INTERNAL MEDICINE CLINIC | Facility: CLINIC | Age: 83
End: 2018-04-06

## 2018-09-07 ENCOUNTER — APPOINTMENT (OUTPATIENT)
Dept: GENERAL RADIOLOGY | Facility: HOSPITAL | Age: 83
DRG: 481 | End: 2018-09-07
Attending: EMERGENCY MEDICINE
Payer: MEDICARE

## 2018-09-07 ENCOUNTER — HOSPITAL ENCOUNTER (INPATIENT)
Facility: HOSPITAL | Age: 83
LOS: 3 days | Discharge: NURSING, REHAB OR LONG-TERM CARE | DRG: 481 | End: 2018-09-10
Attending: EMERGENCY MEDICINE | Admitting: HOSPITALIST
Payer: MEDICARE

## 2018-09-07 DIAGNOSIS — S72.002A CLOSED FRACTURE OF LEFT HIP, INITIAL ENCOUNTER (HCC): Primary | ICD-10-CM

## 2018-09-07 PROBLEM — S72.142A CLOSED FRACTURE OF FEMUR, INTERTROCHANTERIC, LEFT, INITIAL ENCOUNTER (HCC): Status: ACTIVE | Noted: 2018-09-07

## 2018-09-07 LAB
ANION GAP SERPL CALC-SCNC: 10 MMOL/L (ref 0–18)
APTT PPP: 32.7 SECONDS (ref 23.2–35.3)
BACTERIA UR QL AUTO: NEGATIVE /HPF
BASOPHILS # BLD: 0 K/UL (ref 0–0.2)
BASOPHILS NFR BLD: 1 %
BILIRUB UR QL: NEGATIVE
BUN SERPL-MCNC: 6 MG/DL (ref 8–20)
BUN/CREAT SERPL: 9.1 (ref 10–20)
CALCIUM SERPL-MCNC: 8.8 MG/DL (ref 8.5–10.5)
CHLORIDE SERPL-SCNC: 97 MMOL/L (ref 95–110)
CLARITY UR: CLEAR
CO2 SERPL-SCNC: 24 MMOL/L (ref 22–32)
COLOR UR: YELLOW
CREAT SERPL-MCNC: 0.66 MG/DL (ref 0.5–1.5)
EOSINOPHIL # BLD: 0.2 K/UL (ref 0–0.7)
EOSINOPHIL NFR BLD: 3 %
ERYTHROCYTE [DISTWIDTH] IN BLOOD BY AUTOMATED COUNT: 13.6 % (ref 11–15)
GLUCOSE SERPL-MCNC: 153 MG/DL (ref 70–99)
GLUCOSE UR-MCNC: NEGATIVE MG/DL
HCT VFR BLD AUTO: 44.3 % (ref 35–48)
HGB BLD-MCNC: 14.8 G/DL (ref 12–16)
HGB UR QL STRIP.AUTO: NEGATIVE
INR BLD: 0.9 (ref 0.9–1.2)
KETONES UR-MCNC: NEGATIVE MG/DL
LEUKOCYTE ESTERASE UR QL STRIP.AUTO: NEGATIVE
LYMPHOCYTES # BLD: 2 K/UL (ref 1–4)
LYMPHOCYTES NFR BLD: 32 %
MCH RBC QN AUTO: 30.1 PG (ref 27–32)
MCHC RBC AUTO-ENTMCNC: 33.4 G/DL (ref 32–37)
MCV RBC AUTO: 90 FL (ref 80–100)
MONOCYTES # BLD: 0.6 K/UL (ref 0–1)
MONOCYTES NFR BLD: 10 %
MRSA NASAL: NEGATIVE
NEUTROPHILS # BLD AUTO: 3.5 K/UL (ref 1.8–7.7)
NEUTROPHILS NFR BLD: 55 %
NITRITE UR QL STRIP.AUTO: NEGATIVE
OSMOLALITY UR CALC.SUM OF ELEC: 273 MOSM/KG (ref 275–295)
PH UR: 6 [PH] (ref 5–8)
PLATELET # BLD AUTO: 285 K/UL (ref 140–400)
PMV BLD AUTO: 8.1 FL (ref 7.4–10.3)
POTASSIUM SERPL-SCNC: 3.3 MMOL/L (ref 3.3–5.1)
PROT UR-MCNC: NEGATIVE MG/DL
PROTHROMBIN TIME: 12 SECONDS (ref 11.8–14.5)
RBC # BLD AUTO: 4.92 M/UL (ref 3.7–5.4)
RBC #/AREA URNS AUTO: 1 /HPF
SODIUM SERPL-SCNC: 131 MMOL/L (ref 136–144)
SP GR UR STRIP: 1.01 (ref 1–1.03)
STAPH A BY PCR: NEGATIVE
UROBILINOGEN UR STRIP-ACNC: <2
VIT C UR-MCNC: 40 MG/DL
WBC # BLD AUTO: 6.4 K/UL (ref 4–11)
WBC #/AREA URNS AUTO: 1 /HPF

## 2018-09-07 PROCEDURE — 73502 X-RAY EXAM HIP UNI 2-3 VIEWS: CPT | Performed by: EMERGENCY MEDICINE

## 2018-09-07 PROCEDURE — 99223 1ST HOSP IP/OBS HIGH 75: CPT | Performed by: HOSPITALIST

## 2018-09-07 PROCEDURE — 71045 X-RAY EXAM CHEST 1 VIEW: CPT | Performed by: EMERGENCY MEDICINE

## 2018-09-07 RX ORDER — HYDROMORPHONE HYDROCHLORIDE 1 MG/ML
0.5 INJECTION, SOLUTION INTRAMUSCULAR; INTRAVENOUS; SUBCUTANEOUS ONCE
Status: COMPLETED | OUTPATIENT
Start: 2018-09-07 | End: 2018-09-07

## 2018-09-07 RX ORDER — METOPROLOL TARTRATE 5 MG/5ML
2.5 INJECTION INTRAVENOUS AS NEEDED
Status: DISCONTINUED | OUTPATIENT
Start: 2018-09-07 | End: 2018-09-07

## 2018-09-07 RX ORDER — ONDANSETRON 2 MG/ML
4 INJECTION INTRAMUSCULAR; INTRAVENOUS EVERY 6 HOURS PRN
Status: DISCONTINUED | OUTPATIENT
Start: 2018-09-07 | End: 2018-09-10

## 2018-09-07 RX ORDER — SODIUM CHLORIDE 0.9 % (FLUSH) 0.9 %
3 SYRINGE (ML) INJECTION AS NEEDED
Status: DISCONTINUED | OUTPATIENT
Start: 2018-09-07 | End: 2018-09-10

## 2018-09-07 RX ORDER — ACETAMINOPHEN 325 MG/1
650 TABLET ORAL EVERY 6 HOURS PRN
Status: DISCONTINUED | OUTPATIENT
Start: 2018-09-07 | End: 2018-09-10

## 2018-09-07 RX ORDER — METOPROLOL TARTRATE 5 MG/5ML
2.5 INJECTION INTRAVENOUS
Status: DISCONTINUED | OUTPATIENT
Start: 2018-09-07 | End: 2018-09-07

## 2018-09-07 RX ORDER — MORPHINE SULFATE 4 MG/ML
4 INJECTION, SOLUTION INTRAMUSCULAR; INTRAVENOUS EVERY 2 HOUR PRN
Status: DISCONTINUED | OUTPATIENT
Start: 2018-09-07 | End: 2018-09-10

## 2018-09-07 RX ORDER — MORPHINE SULFATE 2 MG/ML
1 INJECTION, SOLUTION INTRAMUSCULAR; INTRAVENOUS EVERY 2 HOUR PRN
Status: DISCONTINUED | OUTPATIENT
Start: 2018-09-07 | End: 2018-09-10

## 2018-09-07 RX ORDER — SODIUM CHLORIDE 9 MG/ML
INJECTION, SOLUTION INTRAVENOUS CONTINUOUS
Status: DISCONTINUED | OUTPATIENT
Start: 2018-09-07 | End: 2018-09-10

## 2018-09-07 RX ORDER — METOPROLOL TARTRATE 50 MG/1
50 TABLET, FILM COATED ORAL
Status: DISCONTINUED | OUTPATIENT
Start: 2018-09-07 | End: 2018-09-07

## 2018-09-07 RX ORDER — METOPROLOL TARTRATE 5 MG/5ML
5 INJECTION INTRAVENOUS
Status: DISCONTINUED | OUTPATIENT
Start: 2018-09-07 | End: 2018-09-07

## 2018-09-07 RX ORDER — METOPROLOL TARTRATE 5 MG/5ML
5 INJECTION INTRAVENOUS AS NEEDED
Status: DISCONTINUED | OUTPATIENT
Start: 2018-09-07 | End: 2018-09-07

## 2018-09-07 RX ORDER — MORPHINE SULFATE 2 MG/ML
2 INJECTION, SOLUTION INTRAMUSCULAR; INTRAVENOUS EVERY 2 HOUR PRN
Status: DISCONTINUED | OUTPATIENT
Start: 2018-09-07 | End: 2018-09-10

## 2018-09-07 NOTE — ED INITIAL ASSESSMENT (HPI)
Pt arrive in ER per [facility] with c/o left inner groin pain r/t mechanical fall at home, pt sts that she slept while going down the stairs, denies hitting her head, denies LOC, not on blood thinner

## 2018-09-07 NOTE — ED PROVIDER NOTES
Patient Seen in: [facility] AND [facility] Emergency Department     History   Patient presents with:  Fall (musculoskeletal, neurologic)    Stated Complaint: tripped and fall, witnessed by family members    HPI    80year old female complains of slipping on a step units Oral Cap,  Take 1 capsule (50,000 Units total) by mouth every 14 (fourteen) days. acetaminophen 500 MG Oral Tab,  Take 1 tablet (500 mg total) by mouth 4 (four) times daily with meals and nightly.    Calcium Carbonate Antacid (TUMS) 500 MG Oral Chew Resp 20   Ht 157.5 cm (5' 2\")   Wt 72.6 kg   SpO2 96%   BMI 29.26 kg/m²         Physical Exam   Constitutional: She is oriented to person, place, and time. She is cooperative. Non-toxic appearance. She does not have a sickly appearance.  She does not appe PLATELET.   Procedure                               Abnormality         Status                     ---------                               -----------         ------                     CBC W/ DIFFERENTIAL[[id number]]                              Final resul CONCLUSION:    1. Cardiomegaly. 2. Atherosclerosis. 3. Hyperinflation. 4. Postop changes right shoulder and left axilla. 5. Demineralization. 6. Osteoarthritis. 7. Fracture distal right humerus with hardware in place.     8. T12 kyphoplast history:   able to obtain history from patient  Factors limiting our ability to obtain a history:  None    Complicating Factors:    The patient already has has Hyperlipidemia; Vitamin D deficiency; Vitamin B 12 deficiency; AD (Alzheimer's disease); OA (oste and interpretation of the above emergency department workup, the patient was found to have Closed fracture of left hip, initial encounter [facility])  (primary encounter diagnosis)    Plan: General supportive care recommendations given to patient. Admit.   Munir Gonzalez

## 2018-09-08 ENCOUNTER — APPOINTMENT (OUTPATIENT)
Dept: GENERAL RADIOLOGY | Facility: HOSPITAL | Age: 83
DRG: 481 | End: 2018-09-08
Attending: ORTHOPAEDIC SURGERY
Payer: MEDICARE

## 2018-09-08 ENCOUNTER — ANESTHESIA EVENT (OUTPATIENT)
Dept: SURGERY | Facility: HOSPITAL | Age: 83
DRG: 481 | End: 2018-09-08
Payer: MEDICARE

## 2018-09-08 ENCOUNTER — APPOINTMENT (OUTPATIENT)
Dept: CV DIAGNOSTICS | Facility: HOSPITAL | Age: 83
DRG: 481 | End: 2018-09-08
Attending: ORTHOPAEDIC SURGERY
Payer: MEDICARE

## 2018-09-08 ENCOUNTER — ANESTHESIA (OUTPATIENT)
Dept: SURGERY | Facility: HOSPITAL | Age: 83
DRG: 481 | End: 2018-09-08
Payer: MEDICARE

## 2018-09-08 LAB
ANION GAP SERPL CALC-SCNC: 8 MMOL/L (ref 0–18)
ANTIBODY SCREEN: NEGATIVE
BASOPHILS # BLD: 0 K/UL (ref 0–0.2)
BASOPHILS NFR BLD: 0 %
BUN SERPL-MCNC: 5 MG/DL (ref 8–20)
BUN/CREAT SERPL: 7.7 (ref 10–20)
CALCIUM SERPL-MCNC: 8.7 MG/DL (ref 8.5–10.5)
CHLORIDE SERPL-SCNC: 99 MMOL/L (ref 95–110)
CO2 SERPL-SCNC: 25 MMOL/L (ref 22–32)
CREAT SERPL-MCNC: 0.65 MG/DL (ref 0.5–1.5)
EOSINOPHIL # BLD: 0 K/UL (ref 0–0.7)
EOSINOPHIL NFR BLD: 0 %
ERYTHROCYTE [DISTWIDTH] IN BLOOD BY AUTOMATED COUNT: 13.5 % (ref 11–15)
GLUCOSE SERPL-MCNC: 155 MG/DL (ref 70–99)
HCT VFR BLD AUTO: 38.6 % (ref 35–48)
HGB BLD-MCNC: 13.3 G/DL (ref 12–16)
LYMPHOCYTES # BLD: 1.1 K/UL (ref 1–4)
LYMPHOCYTES NFR BLD: 11 %
MCH RBC QN AUTO: 31 PG (ref 27–32)
MCHC RBC AUTO-ENTMCNC: 34.5 G/DL (ref 32–37)
MCV RBC AUTO: 89.7 FL (ref 80–100)
MONOCYTES # BLD: 0.9 K/UL (ref 0–1)
MONOCYTES NFR BLD: 9 %
NEUTROPHILS # BLD AUTO: 8.2 K/UL (ref 1.8–7.7)
NEUTROPHILS NFR BLD: 80 %
OSMOLALITY UR CALC.SUM OF ELEC: 274 MOSM/KG (ref 275–295)
PLATELET # BLD AUTO: 251 K/UL (ref 140–400)
PMV BLD AUTO: 8.9 FL (ref 7.4–10.3)
POTASSIUM SERPL-SCNC: 4.6 MMOL/L (ref 3.3–5.1)
RBC # BLD AUTO: 4.3 M/UL (ref 3.7–5.4)
RH BLOOD TYPE: POSITIVE
SODIUM SERPL-SCNC: 132 MMOL/L (ref 136–144)
WBC # BLD AUTO: 10.2 K/UL (ref 4–11)

## 2018-09-08 PROCEDURE — 93306 TTE W/DOPPLER COMPLETE: CPT | Performed by: ORTHOPAEDIC SURGERY

## 2018-09-08 PROCEDURE — 99233 SBSQ HOSP IP/OBS HIGH 50: CPT | Performed by: HOSPITALIST

## 2018-09-08 PROCEDURE — 76001 XR C-ARM FLUORO >1 HOUR  (CPT=76001): CPT | Performed by: ORTHOPAEDIC SURGERY

## 2018-09-08 PROCEDURE — 73502 X-RAY EXAM HIP UNI 2-3 VIEWS: CPT | Performed by: ORTHOPAEDIC SURGERY

## 2018-09-08 PROCEDURE — 0QS706Z REPOSITION LEFT UPPER FEMUR WITH INTRAMEDULLARY INTERNAL FIXATION DEVICE, OPEN APPROACH: ICD-10-PCS | Performed by: ORTHOPAEDIC SURGERY

## 2018-09-08 PROCEDURE — 73552 X-RAY EXAM OF FEMUR 2/>: CPT | Performed by: ORTHOPAEDIC SURGERY

## 2018-09-08 DEVICE — TROCHANTERIC NAIL KIT, TI
Type: IMPLANTABLE DEVICE | Status: FUNCTIONAL
Brand: GAMMA

## 2018-09-08 DEVICE — LAG SCREW, TI
Type: IMPLANTABLE DEVICE | Status: FUNCTIONAL
Brand: GAMMA

## 2018-09-08 DEVICE — LOCKING SCREW, FULLY THREADED: Type: IMPLANTABLE DEVICE | Status: FUNCTIONAL

## 2018-09-08 RX ORDER — LIDOCAINE HYDROCHLORIDE 10 MG/ML
INJECTION, SOLUTION EPIDURAL; INFILTRATION; INTRACAUDAL; PERINEURAL AS NEEDED
Status: DISCONTINUED | OUTPATIENT
Start: 2018-09-08 | End: 2018-09-08 | Stop reason: SURG

## 2018-09-08 RX ORDER — ONDANSETRON 2 MG/ML
INJECTION INTRAMUSCULAR; INTRAVENOUS AS NEEDED
Status: DISCONTINUED | OUTPATIENT
Start: 2018-09-08 | End: 2018-09-08 | Stop reason: SURG

## 2018-09-08 RX ORDER — MORPHINE SULFATE 10 MG/ML
6 INJECTION, SOLUTION INTRAMUSCULAR; INTRAVENOUS EVERY 10 MIN PRN
Status: DISCONTINUED | OUTPATIENT
Start: 2018-09-08 | End: 2018-09-08 | Stop reason: HOSPADM

## 2018-09-08 RX ORDER — DEXAMETHASONE SODIUM PHOSPHATE 4 MG/ML
VIAL (ML) INJECTION AS NEEDED
Status: DISCONTINUED | OUTPATIENT
Start: 2018-09-08 | End: 2018-09-08 | Stop reason: SURG

## 2018-09-08 RX ORDER — MORPHINE SULFATE 4 MG/ML
2 INJECTION, SOLUTION INTRAMUSCULAR; INTRAVENOUS EVERY 10 MIN PRN
Status: DISCONTINUED | OUTPATIENT
Start: 2018-09-08 | End: 2018-09-08 | Stop reason: HOSPADM

## 2018-09-08 RX ORDER — NALOXONE HYDROCHLORIDE 0.4 MG/ML
80 INJECTION, SOLUTION INTRAMUSCULAR; INTRAVENOUS; SUBCUTANEOUS AS NEEDED
Status: DISCONTINUED | OUTPATIENT
Start: 2018-09-08 | End: 2018-09-08 | Stop reason: HOSPADM

## 2018-09-08 RX ORDER — HYDROCODONE BITARTRATE AND ACETAMINOPHEN 5; 325 MG/1; MG/1
1 TABLET ORAL EVERY 6 HOURS PRN
Status: DISCONTINUED | OUTPATIENT
Start: 2018-09-08 | End: 2018-09-10

## 2018-09-08 RX ORDER — PHENYLEPHRINE HCL 10 MG/ML
VIAL (ML) INJECTION AS NEEDED
Status: DISCONTINUED | OUTPATIENT
Start: 2018-09-08 | End: 2018-09-08 | Stop reason: SURG

## 2018-09-08 RX ORDER — ONDANSETRON 2 MG/ML
4 INJECTION INTRAMUSCULAR; INTRAVENOUS ONCE AS NEEDED
Status: DISCONTINUED | OUTPATIENT
Start: 2018-09-08 | End: 2018-09-08 | Stop reason: HOSPADM

## 2018-09-08 RX ORDER — CEFAZOLIN SODIUM/WATER 2 G/20 ML
2 SYRINGE (ML) INTRAVENOUS EVERY 8 HOURS
Status: COMPLETED | OUTPATIENT
Start: 2018-09-08 | End: 2018-09-09

## 2018-09-08 RX ORDER — SODIUM CHLORIDE 9 MG/ML
INJECTION, SOLUTION INTRAVENOUS CONTINUOUS
Status: DISCONTINUED | OUTPATIENT
Start: 2018-09-08 | End: 2018-09-08 | Stop reason: HOSPADM

## 2018-09-08 RX ORDER — BUPIVACAINE HYDROCHLORIDE 5 MG/ML
INJECTION, SOLUTION EPIDURAL; INTRACAUDAL AS NEEDED
Status: DISCONTINUED | OUTPATIENT
Start: 2018-09-08 | End: 2018-09-08 | Stop reason: HOSPADM

## 2018-09-08 RX ORDER — MORPHINE SULFATE 4 MG/ML
4 INJECTION, SOLUTION INTRAMUSCULAR; INTRAVENOUS EVERY 10 MIN PRN
Status: DISCONTINUED | OUTPATIENT
Start: 2018-09-08 | End: 2018-09-08 | Stop reason: HOSPADM

## 2018-09-08 RX ORDER — HYDROCODONE BITARTRATE AND ACETAMINOPHEN 5; 325 MG/1; MG/1
1 TABLET ORAL AS NEEDED
Status: DISCONTINUED | OUTPATIENT
Start: 2018-09-08 | End: 2018-09-08 | Stop reason: HOSPADM

## 2018-09-08 RX ORDER — HYDROCODONE BITARTRATE AND ACETAMINOPHEN 5; 325 MG/1; MG/1
2 TABLET ORAL AS NEEDED
Status: DISCONTINUED | OUTPATIENT
Start: 2018-09-08 | End: 2018-09-08 | Stop reason: HOSPADM

## 2018-09-08 RX ORDER — EPHEDRINE SULFATE 50 MG/ML
INJECTION, SOLUTION INTRAVENOUS AS NEEDED
Status: DISCONTINUED | OUTPATIENT
Start: 2018-09-08 | End: 2018-09-08 | Stop reason: SURG

## 2018-09-08 RX ORDER — CEFAZOLIN SODIUM/WATER 2 G/20 ML
2 SYRINGE (ML) INTRAVENOUS ONCE
Status: COMPLETED | OUTPATIENT
Start: 2018-09-08 | End: 2018-09-08

## 2018-09-08 RX ADMIN — PHENYLEPHRINE HCL 100 MCG: 10 MG/ML VIAL (ML) INJECTION at 11:37:00

## 2018-09-08 RX ADMIN — SODIUM CHLORIDE: 9 INJECTION, SOLUTION INTRAVENOUS at 13:10:00

## 2018-09-08 RX ADMIN — PHENYLEPHRINE HCL 100 MCG: 10 MG/ML VIAL (ML) INJECTION at 12:39:00

## 2018-09-08 RX ADMIN — EPHEDRINE SULFATE 10 MG: 50 INJECTION, SOLUTION INTRAVENOUS at 11:54:00

## 2018-09-08 RX ADMIN — SODIUM CHLORIDE: 9 INJECTION, SOLUTION INTRAVENOUS at 11:31:00

## 2018-09-08 RX ADMIN — PHENYLEPHRINE HCL 200 MCG: 10 MG/ML VIAL (ML) INJECTION at 12:44:00

## 2018-09-08 RX ADMIN — CEFAZOLIN SODIUM/WATER 2 G: 2 G/20 ML SYRINGE (ML) INTRAVENOUS at 11:48:00

## 2018-09-08 RX ADMIN — EPHEDRINE SULFATE 5 MG: 50 INJECTION, SOLUTION INTRAVENOUS at 12:25:00

## 2018-09-08 RX ADMIN — SODIUM CHLORIDE: 9 INJECTION, SOLUTION INTRAVENOUS at 12:45:00

## 2018-09-08 RX ADMIN — EPHEDRINE SULFATE 10 MG: 50 INJECTION, SOLUTION INTRAVENOUS at 12:30:00

## 2018-09-08 RX ADMIN — PHENYLEPHRINE HCL 100 MCG: 10 MG/ML VIAL (ML) INJECTION at 12:51:00

## 2018-09-08 RX ADMIN — PHENYLEPHRINE HCL 100 MCG: 10 MG/ML VIAL (ML) INJECTION at 11:48:00

## 2018-09-08 RX ADMIN — EPHEDRINE SULFATE 5 MG: 50 INJECTION, SOLUTION INTRAVENOUS at 12:03:00

## 2018-09-08 RX ADMIN — ONDANSETRON 4 MG: 2 INJECTION INTRAMUSCULAR; INTRAVENOUS at 12:37:00

## 2018-09-08 RX ADMIN — PHENYLEPHRINE HCL 100 MCG: 10 MG/ML VIAL (ML) INJECTION at 12:32:00

## 2018-09-08 RX ADMIN — PHENYLEPHRINE HCL 100 MCG: 10 MG/ML VIAL (ML) INJECTION at 12:26:00

## 2018-09-08 RX ADMIN — EPHEDRINE SULFATE 5 MG: 50 INJECTION, SOLUTION INTRAVENOUS at 11:58:00

## 2018-09-08 RX ADMIN — DEXAMETHASONE SODIUM PHOSPHATE 4 MG: 4 MG/ML VIAL (ML) INJECTION at 12:37:00

## 2018-09-08 RX ADMIN — LIDOCAINE HYDROCHLORIDE 20 MG: 10 INJECTION, SOLUTION EPIDURAL; INFILTRATION; INTRACAUDAL; PERINEURAL at 11:34:00

## 2018-09-08 RX ADMIN — PHENYLEPHRINE HCL 100 MCG: 10 MG/ML VIAL (ML) INJECTION at 12:29:00

## 2018-09-08 NOTE — CONSULTS
[facility]    PATIENT'S NAME: Jesus Manuel Arzola   ATTENDING PHYSICIAN: Natalie Steward MD   CONSULTING PHYSICIAN: Navneet Hart.  Eber Watkins MD   PATIENT ACCOUNT#:   [id number]    LOCATION:  [address] [place] [address] #:   [id number]       DATE OF BIRTH HISTORY:  She is , has 5 children; 4 sons and 1 daughter. A son-in-law is present with us in the ER tonight. Her cholesterol has been fine. FAMILY HISTORY:  Negative for premature coronary disease. She smoked, but quit over 25 years ago.   Does There is at least a moderate risk of cardiac/ischemic complications related to the proposed surgery and general anesthesia. Given the circumstances, however, it would not be appropriate to postpone the operation for an invasive cardiac workup.       PLAN:

## 2018-09-08 NOTE — ANESTHESIA PREPROCEDURE EVALUATION
Anesthesia PreOp Note    HPI:     Guerrero Ramirez is a 80year old female who presents for preoperative consultation requested by: Lexie Pelaez MD    Date of Surgery: 9/7/2018 - 9/8/2018    Procedure(s):  HIP PINNING  Indication: Closed fracture of le fracture ([facility] [address] 08/2015    OR   • Esophageal foreign body 8/13/2017 7-29-17   • Esophageal perforation 8/13/2017 7-29-17   • Frequent falls    • History of acute respiratory failure 8/13/2017 7-29-17   • History of syncope 5/11/15   • Hyperlipidemi week. Monday ) Disp: 20 capsule Rfl: 0 9/3/2018       Current Facility-Administered Medications Ordered in Epic:  [MAR Hold] Normal Saline Flush 0.9 % injection 3 mL 3 mL Intravenous PRN Sandford Duane, MD 3 mL at 09/07/18 5588   0.9%  NaCl infusion  Intr Years since quittin.7      Smokeless tobacco: Never Used      Tobacco comment: quit smoking in     Substance and Sexual Activity      Alcohol use: Yes        Comment: rare      Drug use: No      Sexual activity: Not on file    Other Topics Anesthesia ROS/Med Hx and Physical Exam     Patient summary reviewed and Nursing notes reviewed    No history of anesthetic complications   Airway   Mallampati: II  TM distance: >3 FB  Neck ROM: full  Dental    (+) lower dentures and upper dentu possible dental damage if relevant, major complications, and any alternative forms of anesthetic management. All of the patient's questions were answered to the best of my ability. The patient desires the anesthetic management as planned.   Terri Brannon

## 2018-09-08 NOTE — H&P
[facility]    PATIENT'S NAME: Jonathan Galvan   ATTENDING PHYSICIAN: Cornelius Reid MD   PATIENT ACCOUNT#:   [id number]    LOCATION:  [place] [address]  MEDICAL RECORD #:   [id number]       YOB: 1930  ADMISSION DATE:       09/0 tobacco, alcohol, or drug use. Lives with her family. Usually independent in her basic activities of daily living. REVIEW OF SYSTEMS:  Patient has mild dementia and she cannot remember how she fell.   According to her son, she was walking around on an [address] [facility] [id number]  FB/

## 2018-09-08 NOTE — BRIEF OP NOTE
Pre-Operative Diagnosis: Closed fracture of left hip, initial encounter [facility] [S72.002A]     Post-Operative Diagnosis: Closed fracture left Hip      Procedure Performed:   Procedure(s):  INTRAMEDULLARY NAILING OF LEFT HIP INTERTROCHANTERIC FEMUR FRACTURE

## 2018-09-08 NOTE — ANESTHESIA POSTPROCEDURE EVALUATION
Patient: Humza Ng    Procedure Summary     Date:  09/08/18 Room / Location:  [address] [place] [address] MAIN OR 06 / [address] [place] [address] MAIN OR    Anesthesia Start:  2234 Anesthesia Stop:  3275    Procedure:  HIP PINNING (Left ) Diagnosis:       Closed fracture of left hip, initial en

## 2018-09-08 NOTE — CONSULTS
[facility]    PATIENT'S NAME: Ashok Blizzard   ATTENDING PHYSICIAN: Osman Chung MD   CONSULTING PHYSICIAN: Lisa Alston MD   PATIENT ACCOUNT#:   [id number]    LOCATION:  [place] [address]  MEDICAL RECORD #:   [id number]       DATE OF BIRTH: of her left foot. She has palpable distal pulses in her foot. She has no tenderness over her knee, foot, or ankle. She has some pain with gentle range of motion of her left hip.   She has no tenderness or pain associated with range of motion of her right nonweightbearing for her left lower extremity. 3.   The patient's family understood the discussion today and agreed with the plan. All of their questions were answered.      Dictated By Kalia Monson MD  d: 09/07/2018 18:48:48  t: 09/07/2018 19:27:40  Job

## 2018-09-08 NOTE — CONSULTS
Cardiology (consult dictated)    Assessment:  1. Fracture of the left hip due to mechanical fall. No loss of consciousness or dizziness. 2.  CAD. History of PCI 15 or so years ago. Abnormal stress test 2015 with reversible apical ischemia.   Patient

## 2018-09-08 NOTE — PROGRESS NOTES
- Echo reviewed by Dr Macario Chang  - EF 55%  - OK for surgery as per Dr. Marita Hart recommendations - see note      YUE Knowles  S Cardiology  09/08/18

## 2018-09-08 NOTE — PROGRESS NOTES
[place] [facility] - [facility]    Progress Note    Arnel Collar Patient Status:  Inpatient    1930 MRN [id number]   Location [facility] 4W/SW/SE Attending Meryle Guarneri, MD   Hosp Day # 1 PCP Vineet Barron MD       Subjective:     Comfo of lumbar spine  Prediabetes  temporal lobe seizures       DVT prophylaxis.   as per Orthopedic surgery     Dispo: Remote telemetry  D/w pt, son and daughter at bedside    Results:     Lab Results   Component Value Date    WBC 10.2 09/08/2018    HGB 13.3 09 fragments. 2.  Stable appearance of contralateral right hip ORIF hardware.    Dictated by (CST): Azam Mims MD on 9/08/2018 at 14:03     Approved by (CST): Azam Mims MD on 9/08/2018 at 14:05          Xr Hip W Or Wo Pelvis 2 Or 3 Views, Left (cpt=[id number])

## 2018-09-09 LAB
ANION GAP SERPL CALC-SCNC: 6 MMOL/L (ref 0–18)
BUN SERPL-MCNC: 9 MG/DL (ref 8–20)
BUN/CREAT SERPL: 13.4 (ref 10–20)
CALCIUM SERPL-MCNC: 8.1 MG/DL (ref 8.5–10.5)
CHLORIDE SERPL-SCNC: 100 MMOL/L (ref 95–110)
CO2 SERPL-SCNC: 22 MMOL/L (ref 22–32)
CREAT SERPL-MCNC: 0.67 MG/DL (ref 0.5–1.5)
ERYTHROCYTE [DISTWIDTH] IN BLOOD BY AUTOMATED COUNT: 13.8 % (ref 11–15)
GLUCOSE SERPL-MCNC: 138 MG/DL (ref 70–99)
HCT VFR BLD AUTO: 29.5 % (ref 35–48)
HGB BLD-MCNC: 10 G/DL (ref 12–16)
MAGNESIUM SERPL-MCNC: 1.8 MG/DL (ref 1.8–2.5)
MCH RBC QN AUTO: 30.5 PG (ref 27–32)
MCHC RBC AUTO-ENTMCNC: 34 G/DL (ref 32–37)
MCV RBC AUTO: 89.7 FL (ref 80–100)
OSMOLALITY UR CALC.SUM OF ELEC: 267 MOSM/KG (ref 275–295)
PLATELET # BLD AUTO: 221 K/UL (ref 140–400)
PMV BLD AUTO: 8.1 FL (ref 7.4–10.3)
POTASSIUM SERPL-SCNC: 4.3 MMOL/L (ref 3.3–5.1)
RBC # BLD AUTO: 3.29 M/UL (ref 3.7–5.4)
SODIUM SERPL-SCNC: 128 MMOL/L (ref 136–144)
WBC # BLD AUTO: 9.4 K/UL (ref 4–11)

## 2018-09-09 PROCEDURE — 99233 SBSQ HOSP IP/OBS HIGH 50: CPT | Performed by: HOSPITALIST

## 2018-09-09 RX ORDER — GALANTAMINE HYDROBROMIDE 4 MG/1
4 TABLET, FILM COATED ORAL 2 TIMES DAILY WITH MEALS
Status: DISCONTINUED | OUTPATIENT
Start: 2018-09-09 | End: 2018-09-10

## 2018-09-09 RX ORDER — MAGNESIUM OXIDE 400 MG (241.3 MG MAGNESIUM) TABLET
400 TABLET ONCE
Status: COMPLETED | OUTPATIENT
Start: 2018-09-09 | End: 2018-09-09

## 2018-09-09 RX ORDER — CHOLECALCIFEROL (VITAMIN D3) 125 MCG
1000 CAPSULE ORAL DAILY
Status: DISCONTINUED | OUTPATIENT
Start: 2018-09-09 | End: 2018-09-10

## 2018-09-09 RX ORDER — ASPIRIN 81 MG/1
81 TABLET ORAL DAILY
Status: DISCONTINUED | OUTPATIENT
Start: 2018-09-10 | End: 2018-09-10

## 2018-09-09 NOTE — PHYSICAL THERAPY NOTE
PHYSICAL THERAPY EVALUATION - INPATIENT     Room Number: 418/418-A  Evaluation Date: 9/9/2018  Type of Evaluation: Initial   Physician Order: PT Eval and Treat    Presenting Problem: L hip fx, now s/p IM nailing  Reason for Therapy: Mobility Dysfunction a motion;Strengthening;Transfer training;Balance training  Rehab Potential : Good  Frequency (Obs): Daily       PHYSICAL THERAPY MEDICAL/SOCIAL HISTORY     History related to current admission: Alzheimers/dementia, recurrent falls, multiple injuries from fal Comment:  and tiny one was cancer in situ  1/2010: REPAIR ROTATOR CUFF,ACUTE      Comment:  R    HOME SITUATION  Type of Home: House   Home Layout: Two level; Able to live on main level  Stairs to Enter : 1     Stairs to [employer]: 0       Lives With: Nan from a chair with arms (e.g., wheelchair, bedside commode, etc.): A Lot   -   Moving from lying on back to sitting on the side of the bed?: A Little   How much help from another person does the patient currently need. ..   -   Moving to and from a bed to a

## 2018-09-09 NOTE — PROGRESS NOTES
[place] [facility] - [facility]    Progress Note    Soren Burr Patient Status:  Inpatient    1930 MRN [id number]   Location [facility] 4W/SW/SE Attending Ana Martin MD   Hosp Day # 2 PCP Chapo Guillaume MD       Subjective:     Comfo macular degeneration  osteoarthritis, osteoporosis, T12 compression fracture requiring kyphoplasty, degenerative joint disease of lumbar spine  Prediabetes  temporal lobe seizures       DVT prophylaxis.   as per Orthopedic surgery on Xarelto    Dispo: Re changes in the soft tissues. Hardware bridges across a intertrochanteric fracture, with gross anatomic alignment of the major fracture fragments. 2.  Stable appearance of contralateral right hip ORIF hardware.    Dictated by (CST): Alex Hernandez MD on 9/08/

## 2018-09-09 NOTE — OCCUPATIONAL THERAPY NOTE
OCCUPATIONAL THERAPY EVALUATION - INPATIENT      Room Number: 418/418-A  Evaluation Date: 9/9/2018  Type of Evaluation: Initial  Presenting Problem: LEFT hip fx    Physician Order: IP Consult to Occupational Therapy  Reason for Therapy: ADL/IADL Dysfunctio Closed fracture of left hip, initial encounter [facility])  Active Problems:    Closed fracture of left hip (HCC)    Closed fracture of femur, intertrochanteric, left, initial encounter [facility])      Past Medical History  Past Medical History:   Diagnosis Date   • ZULMA  Assistive Device(s) Used: cane, pt owns a RW     Prior Level of [place]: Pt lives with her daughter but also stays at her son's home. She is alone at some parts of the day at her son's home. Pt uses a cane at all times.  She is able to dress and b (AM-PAC Scale): 44.27  CMS Modifier (G-Code): CJ    FUNCTIONAL TRANSFER ASSESSMENT  Supine to Sit : Minimum assistance  Sit to Stand: Moderate assistance  Toilet Transfer: NT- +shaver catheter  Shower Transfer: NT  Chair Transfer:  Mod A   Car Transfer: NT

## 2018-09-09 NOTE — PROGRESS NOTES
[facility] Cardiology Progress Note      Bernardino Cisco Patient Status:  Inpatient    1930 MRN [id number]   Location [facility] 4W/SW/SE Attending John Narvaez MD   Hosp Day # 2 PCP Edwin Ogden TELE:  nsr    Results:     Recent Labs   Lab  09/07/18   1650  09/08/18   0508  09/09/18   0500   GLU  153*  155*  138*   BUN  6*  5*  9   CREATSERUM  0.66  0.65  0.67   GFRAA  >60  >60  >60   GFRNAA  >60  >60  >60   CA  8.8  8.7  8.1*   NA  131*  13 intertrochanteric fracture, with gross anatomic alignment of the major fracture fragments. 2.  Stable appearance of contralateral right hip ORIF hardware.    Dictated by (CST): Jonathan Miranda MD on 9/08/2018 at 14:03     Approved by (CST): Jonathan Miranda MD on

## 2018-09-09 NOTE — PROGRESS NOTES
S: I'm OK - it hurts a little but I'm OK  Patient visiting with her son at her side. She is confused thinking that she has her cane somewhere - forgetting recent surgery.     O: Blood pressure 111/80, pulse 74, temperature 98.1 °F (36.7 °C), temperature s

## 2018-09-10 VITALS
HEART RATE: 72 BPM | BODY MASS INDEX: 29.44 KG/M2 | HEIGHT: 62 IN | OXYGEN SATURATION: 95 % | RESPIRATION RATE: 16 BRPM | WEIGHT: 160 LBS | SYSTOLIC BLOOD PRESSURE: 131 MMHG | DIASTOLIC BLOOD PRESSURE: 82 MMHG | TEMPERATURE: 98 F

## 2018-09-10 LAB
ANION GAP SERPL CALC-SCNC: 3 MMOL/L (ref 0–18)
BUN SERPL-MCNC: 9 MG/DL (ref 8–20)
BUN/CREAT SERPL: 13.6 (ref 10–20)
CALCIUM SERPL-MCNC: 8.2 MG/DL (ref 8.5–10.5)
CHLORIDE SERPL-SCNC: 102 MMOL/L (ref 95–110)
CO2 SERPL-SCNC: 27 MMOL/L (ref 22–32)
CREAT SERPL-MCNC: 0.66 MG/DL (ref 0.5–1.5)
ERYTHROCYTE [DISTWIDTH] IN BLOOD BY AUTOMATED COUNT: 13.7 % (ref 11–15)
GLUCOSE SERPL-MCNC: 123 MG/DL (ref 70–99)
HCT VFR BLD AUTO: 28.1 % (ref 35–48)
HGB BLD-MCNC: 9.5 G/DL (ref 12–16)
MAGNESIUM SERPL-MCNC: 1.9 MG/DL (ref 1.8–2.5)
MCH RBC QN AUTO: 30.4 PG (ref 27–32)
MCHC RBC AUTO-ENTMCNC: 33.8 G/DL (ref 32–37)
MCV RBC AUTO: 89.7 FL (ref 80–100)
OSMOLALITY UR CALC.SUM OF ELEC: 274 MOSM/KG (ref 275–295)
PLATELET # BLD AUTO: 209 K/UL (ref 140–400)
PMV BLD AUTO: 8 FL (ref 7.4–10.3)
POTASSIUM SERPL-SCNC: 5.2 MMOL/L (ref 3.3–5.1)
RBC # BLD AUTO: 3.13 M/UL (ref 3.7–5.4)
SODIUM SERPL-SCNC: 132 MMOL/L (ref 136–144)
WBC # BLD AUTO: 9 K/UL (ref 4–11)

## 2018-09-10 PROCEDURE — 99239 HOSP IP/OBS DSCHRG MGMT >30: CPT | Performed by: HOSPITALIST

## 2018-09-10 RX ORDER — PSEUDOEPHEDRINE HCL 30 MG
100 TABLET ORAL 2 TIMES DAILY
Qty: 60 CAPSULE | Refills: 0 | Status: SHIPPED
Start: 2018-09-10 | End: 2018-12-07 | Stop reason: ALTCHOICE

## 2018-09-10 RX ORDER — POLYETHYLENE GLYCOL 3350 17 G/17G
17 POWDER, FOR SOLUTION ORAL DAILY PRN
Qty: 30 EACH | Refills: 0 | Status: SHIPPED
Start: 2018-09-10 | End: 2018-12-07 | Stop reason: ALTCHOICE

## 2018-09-10 RX ORDER — DOCUSATE SODIUM 100 MG/1
100 CAPSULE, LIQUID FILLED ORAL 2 TIMES DAILY
Status: DISCONTINUED | OUTPATIENT
Start: 2018-09-10 | End: 2018-09-10

## 2018-09-10 RX ORDER — POLYETHYLENE GLYCOL 3350 17 G/17G
17 POWDER, FOR SOLUTION ORAL DAILY
Status: DISCONTINUED | OUTPATIENT
Start: 2018-09-10 | End: 2018-09-10

## 2018-09-10 RX ORDER — ACETAMINOPHEN 325 MG/1
650 TABLET ORAL EVERY 4 HOURS PRN
Qty: 30 TABLET | Refills: 0 | Status: SHIPPED
Start: 2018-09-10 | End: 2018-12-07

## 2018-09-10 NOTE — OPERATIVE REPORT
[facility]    PATIENT'S NAME: Radhajay Mendoza   ATTENDING PHYSICIAN: Guerrero Cox MD   OPERATING PHYSICIAN: Elton Valles MD   PATIENT ACCOUNT#:   [id number]    LOCATION:  [address] [place] [address] RECORD #:   [id number]       DATE OF LUIS then inserted a guidewire through the greater trochanter. Adequate placement of the guidewire was confirmed using a C-arm. We then drilled over the guidewire. The Gamma Nail was inserted without difficulty.   Adequate placement of the Gamma Nail was conf 15:29:00  [facility] [id number]  /

## 2018-09-10 NOTE — CM/SW NOTE
MIGUEL met with the pt. And her dtr, Clayton Campuzano At bedside. The pt. Lives with both her son and dtr. The pt. Switches between her children, but is primarily with her son Donald Meek in [facility]. The pt.  Reports being independent prior to admission with adls and ambulatio

## 2018-09-10 NOTE — PROGRESS NOTES
ORTHO NOTE    S: Patient complains of mild L hip pain today.     O: AVSS  LLE- hip incision sites clean/dry/no erythema  Compartments soft; no calf tenderness; brisk cap refill and SILT in all toes;  NVI distally    A/P: s/p IM mauricio L intertrochanteric hip f

## 2018-09-10 NOTE — PROGRESS NOTES
[facility]    Progress Note    [place] Landers Patient Status:  Inpatient    1930 MRN [id number]   Location [facility] 4W/SW/SE Attending Leigha Rivas MD   Hosp Day # 3 PCP Edda Govea MD       Subjective:     Doing cancer, status post left lumpectomy followed by tamoxifen for 5 years.      macular degeneration  osteoarthritis, osteoporosis, T12 compression fracture requiring kyphoplasty, degenerative joint disease of lumbar spine  Prediabetes  temporal lobe seizures

## 2018-09-10 NOTE — CM/SW NOTE
The pt. Is scheduled to discharge to [facility] today 9/10 at 80p, via [employer]. The pt. And her dtr. Are aware of payment at time of service. The pt. And her dtr. Are aware of discharge.   ([employer] quote $33)    Report [facility]

## 2018-09-10 NOTE — PROGRESS NOTES
[facility]    Cardiology Progress Note    Rahat Song Patient Status:  Inpatient    1930 MRN [id number]   Location [facility] 4W/SW/SE Attending Justice Rubinstein, MD   Hosp Day # 3 PCP Kristin Adkins MD     Primary Card 9 09/10/2018     (L) 09/10/2018    K 5.2 (H) 09/10/2018     09/10/2018    CO2 27 09/10/2018     (H) 09/10/2018    CA 8.2 (L) 09/10/2018    ALB 3.2 (L) 11/10/2017    ALKPHO 75 11/10/2017    BILT 0.4 11/10/2017    TP 6.4 11/10/2017    AST

## 2018-09-10 NOTE — PHYSICAL THERAPY NOTE
PHYSICAL THERAPY TREATMENT NOTE - INPATIENT     Room Number: 084/693-X       Presenting Problem: L hip fx, now s/p IM nailing    Problem List  Principal Problem:    Closed fracture of left hip, initial encounter [facility])  Active Problems:    Closed fracture o +  Dynamic Sitting: Fair           Static Standing: Poor +  Dynamic Standing: Poor    ACTIVITY TOLERANCE  Room air    AM-PAC '6-Clicks' INPATIENT SHORT FORM - BASIC MOBILITY  How much difficulty does the patient currently have. ..  -   Turning over in bed ( assistance   Goal #3   Current Status 10' with the RW mod A and with chair follow for safety.     Goal #4 No stair goal at this time, will reassess as appropriate   Goal #4   Current Status NT   Goal #5 Patient to demonstrate home activity/

## 2018-09-10 NOTE — PLAN OF CARE
Altered Communication/Language Barrier    • Patient/Family is able to understand and participate in their care Completed          DISCHARGE PLANNING    • Discharge to home or other facility with appropriate resources Progressing        PAIN - ADULT    • Ve

## 2018-09-11 ENCOUNTER — SNF VISIT (OUTPATIENT)
Dept: INTERNAL MEDICINE CLINIC | Facility: SKILLED NURSING FACILITY | Age: 83
End: 2018-09-11

## 2018-09-11 DIAGNOSIS — Z87.81 S/P LEFT HIP FRACTURE: ICD-10-CM

## 2018-09-11 DIAGNOSIS — G30.1 LATE ONSET ALZHEIMER'S DISEASE WITHOUT BEHAVIORAL DISTURBANCE (HCC): ICD-10-CM

## 2018-09-11 DIAGNOSIS — R53.1 WEAKNESS: ICD-10-CM

## 2018-09-11 DIAGNOSIS — F02.80 LATE ONSET ALZHEIMER'S DISEASE WITHOUT BEHAVIORAL DISTURBANCE (HCC): ICD-10-CM

## 2018-09-11 PROCEDURE — 99309 SBSQ NF CARE MODERATE MDM 30: CPT | Performed by: NURSE PRACTITIONER

## 2018-09-11 NOTE — PROGRESS NOTES
HPI: Rian Ansari  is an 79 yo female with a PMH significant for dementia, CAD, HTN, breast ca s/p left lumpectomy followed by tamoxifen for 5 years, temporal lobe seizures who sustained a left femur intertrochanteric fracture after a mechanical fall Comment:  R  Family History   Problem Relation Age of Onset   • Diabetes Brother    • Alcohol and Other Disorders Associated Brother    • Alcohol and Other Disorders Associated Brother    • Diabetes Brother    • High Blood Pressure Son      Social History xarelto for dvt prophylaxis      History of coronary artery disease, abnormal EKG, and abnormal stress test in 2015 (subsequently she had a right hip repair 2016 without any cardiac complications)  ECHO EF 74%  -cont metoprolol 12.5mg bid, asa 81      Epi

## 2018-09-21 PROBLEM — Z48.89 AFTERCARE FOLLOWING SURGERY: Status: ACTIVE | Noted: 2018-09-21

## 2018-09-28 ENCOUNTER — SNF VISIT (OUTPATIENT)
Dept: INTERNAL MEDICINE CLINIC | Facility: SKILLED NURSING FACILITY | Age: 83
End: 2018-09-28

## 2018-09-28 DIAGNOSIS — R53.1 WEAKNESS: ICD-10-CM

## 2018-09-28 DIAGNOSIS — S72.002D CLOSED FRACTURE OF LEFT HIP WITH ROUTINE HEALING, SUBSEQUENT ENCOUNTER: ICD-10-CM

## 2018-09-28 PROCEDURE — 99307 SBSQ NF CARE SF MDM 10: CPT | Performed by: NURSE PRACTITIONER

## 2018-09-28 NOTE — PROGRESS NOTES
HPI: Zach Alex  is an 79 yo female with a PMH significant for dementia, CAD, HTN, breast ca s/p left lumpectomy followed by tamoxifen for 5 years, temporal lobe seizures who sustained a left femur intertrochanteric fracture after a mechanical fall one was cancer in situ  1/2010: [facility]      Comment:  R          ALLERGIES:  No Known Allergies     CODE STATUS:  Full Code     CURRENT MEDICATIONS: Reviewed on SNF EMR  VITALS: Reviewed   LABS/Imaging: Reviewed                 REVIEW OF lumpectomy followed by tamoxifen for 5 years.     Outpatient follow up      Macular degeneration  Cont present management     Osteoarthritis, osteoporosis, T12 compression fracture requiring kyphoplasty/degenerative joint disease of lumbar spine  Pt/ot, pa

## 2018-09-28 NOTE — DISCHARGE SUMMARY
[place] [facility]  DISCHARGE SUMMARY     Vashti Atkins Patient Status:  Inpatient    1930 MRN [id number]   Location [facility] 4W/SW/SE Attending No att. providers found   [address] Day # 3 PCP Eri Garcia MD     Date of Admission:  without any cardiac complications)  Seen by cardiology consult  ECHO reassuring, EF 55%  Clinically also stable, no chest pains or shortness of breath  -beta-blocker protocol   -ASA     Dementia.   -Monitor closely after surgery, high risk for delirium    capsule  Refills:  0        CONTINUE taking these medications      Instructions Prescription details   aspirin 81 MG Tabs      Take 81 mg by mouth daily.    Refills:  0     Galantamine Hydrobromide ER 24 MG Cp24  Commonly known as:  RAZADYNE ER      TAKE 1 edema.  -----------------------------------------------------------------------------------------------  PATIENT DISCHARGE INSTRUCTIONS: See electronic chart      Hospital Discharge Diagnoses: left femur fracture    Lace+ Score: 71  59-90 High Risk  29-58

## 2018-10-01 ENCOUNTER — SNF VISIT (OUTPATIENT)
Dept: INTERNAL MEDICINE CLINIC | Facility: SKILLED NURSING FACILITY | Age: 83
End: 2018-10-01

## 2018-10-01 DIAGNOSIS — S72.002D CLOSED FRACTURE OF LEFT HIP WITH ROUTINE HEALING, SUBSEQUENT ENCOUNTER: ICD-10-CM
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
## 2018-10-01 DIAGNOSIS — R53.1 WEAKNESS: ICD-10-CM

## 2018-10-01 PROCEDURE — 99308 SBSQ NF CARE LOW MDM 20: CPT | Performed by: NURSE PRACTITIONER

## 2018-10-01 NOTE — PROGRESS NOTES
HPI: Anh MORIN Kenn  is an 79 yo female with a PMH significant for dementia, CAD, HTN, breast ca s/p left lumpectomy followed by tamoxifen for 5 years, temporal lobe seizures who sustained a left femur intertrochanteric fracture after a mechanical fall cancer in situ  1/2010: REPAIR ROTATOR CUFF,ACUTE      Comment:  P           ALLERGIES:  No Known Allergies     CODE STATUS:  Full Code     CURRENT MEDICATIONS: Reviewed on SNF EMR  VITALS: Reviewed   LABS/Imaging: Reviewed     REVIEW OF SYSTEMS: Patient w are now resolved       Dementia. - high risk for delirium  - monitor/cont present management     Hypertension/controlled  Cont present management     Hyponatremia.  resolved  -monitor bmp in rehab  Na 136 9/17     h/o breast cancer, status post left lumpec

## 2018-10-05 ENCOUNTER — SNF VISIT (OUTPATIENT)
Dept: INTERNAL MEDICINE CLINIC | Facility: SKILLED NURSING FACILITY | Age: 83
End: 2018-10-05

## 2018-10-05 DIAGNOSIS — G30.1 LATE ONSET ALZHEIMER'S DISEASE WITHOUT BEHAVIORAL DISTURBANCE (HCC): ICD-10-CM

## 2018-10-05 DIAGNOSIS — F02.80 LATE ONSET ALZHEIMER'S DISEASE WITHOUT BEHAVIORAL DISTURBANCE (HCC): ICD-10-CM

## 2018-10-05 DIAGNOSIS — R53.1 WEAKNESS: ICD-10-CM
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
## 2018-10-05 PROCEDURE — 99308 SBSQ NF CARE LOW MDM 20: CPT | Performed by: NURSE PRACTITIONER

## 2018-10-05 NOTE — PROGRESS NOTES
HPI: Anh MORIN Kenn  is an 81 yo female with a PMH significant for dementia, CAD, HTN, breast ca s/p left lumpectomy followed by tamoxifen for 5 years, temporal lobe seizures who sustained a left femur intertrochanteric fracture after a mechanical fall cancer in situ  1/2010: REPAIR ROTATOR CUFF,ACUTE      Comment:  T           ALLERGIES:  No Known Allergies     CODE STATUS:  Full Code     CURRENT MEDICATIONS: Reviewed on SNF EMR  VITALS: Reviewed   LABS/Imaging: Reviewed     REVIEW OF SYSTEMS: Patient w to avoid worsening delirium  -pt/ot, wbat  - xarelto for dvt prophylaxis completed per ortho   - f/u Dr. Nathalia Newman 9/21 RTC again in 4 weeks     History of coronary artery disease, abnormal EKG, and abnormal stress test in 2015 (subsequently she had a right hi

## 2018-10-13 RX ORDER — GALANTAMINE HYDROBROMIDE 24 MG/1
CAPSULE, EXTENDED RELEASE ORAL
Qty: 90 CAPSULE | Refills: 0 | Status: CANCELLED | OUTPATIENT
Start: 2018-10-13

## 2018-10-14 RX ORDER — GALANTAMINE HYDROBROMIDE 24 MG/1
CAPSULE, EXTENDED RELEASE ORAL
Qty: 90 CAPSULE | Refills: 3 | Status: SHIPPED | OUTPATIENT
Start: 2018-10-14 | End: 2019-11-08

## 2018-10-15 RX ORDER — ERGOCALCIFEROL 1.25 MG/1
CAPSULE ORAL
Qty: 6 CAPSULE | Refills: 0 | Status: SHIPPED | OUTPATIENT
Start: 2018-10-15 | End: 2019-01-04

## 2018-10-15 NOTE — TELEPHONE ENCOUNTER
Pt not seen since 12/2017   She is on [facility] list but renewal date is 10/12/18    Last Vitamin D 2016- 38   She has been followed by [facility]N in 2018 at [facility]      VM to marcosr Marjorie Yee to call and schedule f/u apt in office with PCP as mom is being d/c'd from [facility] soon.

## 2018-10-16 ENCOUNTER — TELEPHONE (OUTPATIENT)
Dept: INTERNAL MEDICINE CLINIC | Facility: CLINIC | Age: 83
End: 2018-10-16

## 2018-10-16 NOTE — TELEPHONE ENCOUNTER
Call to [facility] to transfer rx to [employer]. Pt is staying with dtr Jamshid Rosenberg since d/c SNF. Discussed how mom is doing since d/c. [facility]able amb[facility]

## 2018-10-16 NOTE — TELEPHONE ENCOUNTER
Patient's daughter has questions about a rx that was recently requested. She requested it be sent to the [facility] in [address] [place] [address] but she believes it was sent to the one in [facility].      GALANTAMINE HYDROBROMIDE ER 24 MG    She asks that a nurse please return h

## 2018-11-09 ENCOUNTER — TELEPHONE (OUTPATIENT)
Dept: INTERNAL MEDICINE CLINIC | Facility: CLINIC | Age: 83
End: 2018-11-09

## 2018-11-09 NOTE — TELEPHONE ENCOUNTER
Johana Stevens from patient's home health service is calling regarding obtaining an order so pt may have consult with a social  to see what patient qualifies for.  Please return Luci's call at [phone]

## 2018-11-15 ENCOUNTER — TELEPHONE (OUTPATIENT)
Dept: INTERNAL MEDICINE CLINIC | Facility: CLINIC | Age: 83
End: 2018-11-15

## 2018-11-15 NOTE — TELEPHONE ENCOUNTER
Erasmo from [facility] called requesting a verbal order for patient. Order is for home health p/t for 3-4 weeks, two times a week following left hip fracture. Please call Erasmo at [phone].

## 2018-12-03 ENCOUNTER — TELEPHONE (OUTPATIENT)
Dept: INTERNAL MEDICINE CLINIC | Facility: CLINIC | Age: 83
End: 2018-12-03

## 2018-12-07 ENCOUNTER — NURSE ONLY (OUTPATIENT)
Dept: INTERNAL MEDICINE CLINIC | Facility: CLINIC | Age: 83
End: 2018-12-07

## 2018-12-07 ENCOUNTER — MED REC SCAN ONLY (OUTPATIENT)
Dept: INTERNAL MEDICINE CLINIC | Facility: CLINIC | Age: 83
End: 2018-12-07

## 2018-12-07 ENCOUNTER — LAB ENCOUNTER (OUTPATIENT)
Dept: LAB | Age: 83
End: 2018-12-07
Attending: INTERNAL MEDICINE
Payer: MEDICARE

## 2018-12-07 ENCOUNTER — OFFICE VISIT (OUTPATIENT)
Dept: INTERNAL MEDICINE CLINIC | Facility: CLINIC | Age: 83
End: 2018-12-07
Payer: MEDICARE

## 2018-12-07 VITALS
TEMPERATURE: 98 F | WEIGHT: 147 LBS | HEIGHT: 61 IN | DIASTOLIC BLOOD PRESSURE: 70 MMHG | BODY MASS INDEX: 27.75 KG/M2 | RESPIRATION RATE: 16 BRPM | OXYGEN SATURATION: 97 % | SYSTOLIC BLOOD PRESSURE: 135 MMHG | HEART RATE: 80 BPM

## 2018-12-07 DIAGNOSIS — Z00.00 ROUTINE GENERAL MEDICAL EXAMINATION AT A HEALTH CARE FACILITY: Primary | ICD-10-CM

## 2018-12-07 DIAGNOSIS — Z00.00 ROUTINE GENERAL MEDICAL EXAMINATION AT A HEALTH CARE FACILITY: ICD-10-CM

## 2018-12-07 DIAGNOSIS — R69 DIAGNOSIS UNKNOWN: Primary | ICD-10-CM

## 2018-12-07 DIAGNOSIS — Z00.01 ENCOUNTER FOR GENERAL ADULT MEDICAL EXAMINATION WITH ABNORMAL FINDINGS: Primary | ICD-10-CM

## 2018-12-07 PROCEDURE — 83090 ASSAY OF HOMOCYSTEINE: CPT

## 2018-12-07 PROCEDURE — 99173 VISUAL ACUITY SCREEN: CPT | Performed by: INTERNAL MEDICINE

## 2018-12-07 PROCEDURE — 92551 PURE TONE HEARING TEST AIR: CPT | Performed by: INTERNAL MEDICINE

## 2018-12-07 PROCEDURE — G0439 PPPS, SUBSEQ VISIT: HCPCS | Performed by: INTERNAL MEDICINE

## 2018-12-07 PROCEDURE — 36415 COLL VENOUS BLD VENIPUNCTURE: CPT

## 2018-12-07 PROCEDURE — 93000 ELECTROCARDIOGRAM COMPLETE: CPT | Performed by: INTERNAL MEDICINE

## 2018-12-07 PROCEDURE — 94010 BREATHING CAPACITY TEST: CPT | Performed by: INTERNAL MEDICINE

## 2018-12-07 RX ORDER — UBIDECARENONE 50 MG
CAPSULE ORAL
Refills: 0 | COMMUNITY
Start: 2018-12-07 | End: 2018-12-17

## 2018-12-07 RX ORDER — PROPRANOLOL/HYDROCHLOROTHIAZID 40 MG-25MG
TABLET ORAL
Refills: 0 | COMMUNITY
Start: 2018-12-07 | End: 2021-01-01

## 2018-12-07 NOTE — PROGRESS NOTES
*LOOKIN' BODY RESULTS    YES:       NO: X      REASON TEST NOT PERFORMED: WAIVED       HEIGHT: 6'2  BQUOKF:860  _____________________________________________________________________________  *VENIPUNCTURE    YES:       NO:  X      REASON VENIPUNCTURE NOT P

## 2018-12-07 NOTE — PROGRESS NOTES
HPI:    Patient ID: Rian Ansari is a 80year old female here for CPE 12-7-18    CPE recent Left hip fx OR, Memory worse per dKaren here        Review of Systems   HENT: Negative. Eyes: Positive for visual disturbance (cataracts).         Macular de Capsule SR 24 Hr TAKE 1 CAPSULE PO QD WITH BREAKFAST Disp: 90 capsule Rfl: 3   aspirin 81 MG Oral Tab Take 81 mg by mouth daily. Disp:  Rfl:          Allergies:No Known Allergies          PHYSICAL EXAM:   Physical Exam   Constitutional: No distress.    NANNETTE (osteoporosis)     Fracture, humerus closed      Past Medical History:  Past Medical History:   Diagnosis Date   • AD (Alzheimer's disease)    • Benign essential HTN    • Breast CA ([facility] [address]     left lumpectomy and 5 yrs tamoxifen   • Burn of left shoulder Associated Brother    • Diabetes Brother    • High Blood Pressure Son        Social History:  Social History    Socioeconomic History      Marital status:        Spouse name: Not on file      Number of children: 5      Years of education: Not on file Index:  Occasional anxiety   Depression Index: No major depression   Cage Questionnaire: No alcohol abuse  Sleepiness Index: Normal  Exercise/Activity: None  Female Sexual Health Screening: N/A  Metabolic syndrome (hypertension, lipid abnormality, obesity, general adult medical examination with abnormal findings  (primary encounter diagnosis)      Meds This Visit:  Requested Prescriptions      No prescriptions requested or ordered in this encounter       Imaging & Referrals:  None       SUMMARY:    1.  First

## 2018-12-12 ENCOUNTER — MED REC SCAN ONLY (OUTPATIENT)
Dept: INTERNAL MEDICINE CLINIC | Facility: CLINIC | Age: 83
End: 2018-12-12

## 2018-12-17 ENCOUNTER — MED REC SCAN ONLY (OUTPATIENT)
Dept: INTERNAL MEDICINE CLINIC | Facility: CLINIC | Age: 83
End: 2018-12-17

## 2018-12-17 RX ORDER — CHOLECALCIFEROL (VITAMIN D3) 25 MCG
TABLET,CHEWABLE ORAL
Status: ON HOLD | COMMUNITY
Start: 2018-12-17 | End: 2021-01-01

## 2018-12-17 RX ORDER — RED YEAST RICE EXTRACT
POWDER (GRAM) MISCELLANEOUS
Refills: 0 | COMMUNITY
Start: 2018-12-17 | End: 2019-12-10

## 2018-12-18 ENCOUNTER — TELEPHONE (OUTPATIENT)
Dept: INTERNAL MEDICINE CLINIC | Facility: CLINIC | Age: 83
End: 2018-12-18

## 2019-01-04 RX ORDER — MEMANTINE HYDROCHLORIDE 5 MG/1
TABLET ORAL
Qty: 60 TABLET | Refills: 3 | Status: SHIPPED | OUTPATIENT
Start: 2019-01-04 | End: 2019-12-10

## 2019-01-04 RX ORDER — ERGOCALCIFEROL 1.25 MG/1
CAPSULE ORAL
Qty: 6 CAPSULE | Refills: 3 | Status: SHIPPED | OUTPATIENT
Start: 2019-01-04 | End: 2019-12-10

## 2019-08-05 ENCOUNTER — TELEPHONE (OUTPATIENT)
Dept: INTERNAL MEDICINE CLINIC | Facility: CLINIC | Age: 84
End: 2019-08-05

## 2019-08-05 NOTE — TELEPHONE ENCOUNTER
Patient's daughter called and says patient has been having leg pain and is unable to bear any weight on it. She wonders if she should bring patient in or if some kind of test can be ordered.

## 2019-08-05 NOTE — TELEPHONE ENCOUNTER
PC from  dtr    Pt's ( mom) Left leg cannot bear weight since Sat., pt lives with family member in [facility]. Per Dr. Isabel Polanco - patient should go to urgent care and be evaluated for possible fracture.      Family verbalized understanding

## 2019-08-12 ENCOUNTER — TELEPHONE (OUTPATIENT)
Dept: INTERNAL MEDICINE CLINIC | Facility: CLINIC | Age: 84
End: 2019-08-12

## 2019-09-09 ENCOUNTER — TELEPHONE (OUTPATIENT)
Dept: INTERNAL MEDICINE CLINIC | Facility: CLINIC | Age: 84
End: 2019-09-09

## 2019-11-06 ENCOUNTER — TELEPHONE (OUTPATIENT)
Dept: INTERNAL MEDICINE CLINIC | Facility: CLINIC | Age: 84
End: 2019-11-06

## 2019-11-11 RX ORDER — GALANTAMINE HYDROBROMIDE 24 MG/1
CAPSULE, EXTENDED RELEASE ORAL
Qty: 90 CAPSULE | Refills: 0 | Status: SHIPPED | OUTPATIENT
Start: 2019-11-11 | End: 2019-12-10

## 2019-12-10 ENCOUNTER — NURSE ONLY (OUTPATIENT)
Dept: INTERNAL MEDICINE CLINIC | Facility: CLINIC | Age: 84
End: 2019-12-10

## 2019-12-10 ENCOUNTER — TELEPHONE (OUTPATIENT)
Dept: INTERNAL MEDICINE CLINIC | Facility: CLINIC | Age: 84
End: 2019-12-10

## 2019-12-10 ENCOUNTER — OFFICE VISIT (OUTPATIENT)
Dept: INTERNAL MEDICINE CLINIC | Facility: CLINIC | Age: 84
End: 2019-12-10
Payer: MEDICARE

## 2019-12-10 VITALS
DIASTOLIC BLOOD PRESSURE: 76 MMHG | RESPIRATION RATE: 14 BRPM | OXYGEN SATURATION: 95 % | SYSTOLIC BLOOD PRESSURE: 130 MMHG | BODY MASS INDEX: 29.27 KG/M2 | HEART RATE: 76 BPM | TEMPERATURE: 98 F | HEIGHT: 61 IN | WEIGHT: 155 LBS

## 2019-12-10 DIAGNOSIS — Z00.01 ENCOUNTER FOR GENERAL ADULT MEDICAL EXAMINATION WITH ABNORMAL FINDINGS: Primary | ICD-10-CM

## 2019-12-10 DIAGNOSIS — Z00.00 ROUTINE GENERAL MEDICAL EXAMINATION AT A HEALTH CARE FACILITY: Primary | ICD-10-CM

## 2019-12-10 PROCEDURE — 93000 ELECTROCARDIOGRAM COMPLETE: CPT | Performed by: INTERNAL MEDICINE

## 2019-12-10 PROCEDURE — 92551 PURE TONE HEARING TEST AIR: CPT | Performed by: INTERNAL MEDICINE

## 2019-12-10 PROCEDURE — 99173 VISUAL ACUITY SCREEN: CPT | Performed by: INTERNAL MEDICINE

## 2019-12-10 PROCEDURE — G0439 PPPS, SUBSEQ VISIT: HCPCS | Performed by: INTERNAL MEDICINE

## 2019-12-10 PROCEDURE — 94010 BREATHING CAPACITY TEST: CPT | Performed by: INTERNAL MEDICINE

## 2019-12-10 RX ORDER — LEVOFLOXACIN 250 MG/1
250 TABLET ORAL DAILY
Qty: 7 TABLET | Refills: 0 | Status: SHIPPED | OUTPATIENT
Start: 2019-12-10 | End: 2019-12-28

## 2019-12-10 RX ORDER — GALANTAMINE HYDROBROMIDE 24 MG/1
CAPSULE, EXTENDED RELEASE ORAL
Qty: 90 CAPSULE | Refills: 3 | Status: SHIPPED | OUTPATIENT
Start: 2019-12-10 | End: 2020-01-01

## 2019-12-10 RX ORDER — ERGOCALCIFEROL 1.25 MG/1
50000 CAPSULE ORAL WEEKLY
Qty: 6 CAPSULE | Refills: 3 | COMMUNITY
Start: 2019-12-10 | End: 2021-01-01

## 2019-12-10 NOTE — PROGRESS NOTES
*LOOKIN' BODY RESULTS    YES:       NO: x      REASON TEST NOT PERFORMED: Pt unsteady/walker       HEIGHT: 5'1  WEIGHT:155 lb  _____________________________________________________________________________  *VENIPUNCTURE    YES:  x     NO:        REASON HOANG

## 2019-12-10 NOTE — TELEPHONE ENCOUNTER
Call to dtr Deepika Weber re Diclofenac gel 1% not covered by insurance. Gave dtr [facility] info to bring cost down from $55-60 to $20s for 100gm tube.

## 2019-12-28 PROBLEM — K22.3 ESOPHAGEAL PERFORATION: Status: RESOLVED | Noted: 2017-08-13 | Resolved: 2019-12-28

## 2019-12-28 PROBLEM — Z87.09 HISTORY OF ACUTE RESPIRATORY FAILURE: Status: RESOLVED | Noted: 2017-08-13 | Resolved: 2019-12-28

## 2019-12-28 PROBLEM — T18.108A ESOPHAGEAL FOREIGN BODY: Status: RESOLVED | Noted: 2017-08-13 | Resolved: 2019-12-28

## 2019-12-28 PROBLEM — M54.9 ACUTE BACK PAIN: Status: RESOLVED | Noted: 2017-11-09 | Resolved: 2019-12-28

## 2019-12-28 PROBLEM — S72.002A CLOSED LEFT HIP FRACTURE (HCC): Status: ACTIVE | Noted: 2018-09-01

## 2019-12-29 NOTE — PROGRESS NOTES
HPI:    Patient ID: Erika Arora is a 80year old female here with daughter and caregiver for CPE    Has advanced AD, just does simple yes/no answers, is happy and carefree in her own little world.  Recognizes me as a doctor but not by name      Review 3   • Cyanocobalamin (B-12) 1000 MCG Oral Cap 1/day     • Nutritional Supplements (JUICE PLUS FIBRE) Oral Liquid Take by mouth daily. 0   • Turmeric 500 MG Oral Cap One daily  0   • aspirin 81 MG Oral Tab Take 81 mg by mouth daily.        Allergies:No Know Benign essential HTN    • Breast CA (HCC)     left lumpectomy and 5 yrs tamoxifen   • Burn of left shoulder     heating pad   • CAD (coronary artery disease) 1995    s/p stent for 90% blockage   • Closed left hip fracture ([facility] [address] 09/2018    Intertrochanter n History:  Social History    Socioeconomic History      Marital status:        Spouse name: Not on file      Number of children: 5      Years of education: Not on file      Highest education level: Not on file    Occupational History      Occupation: Seat Belt: Yes        Self-Exams: Not Asked    Social History Narrative      Not on file      Health Maintenance:  Immunization History   Administered Date(s) Administered   • Fluvirin, 3 Years & >, Im 09/23/2008, 10/12/2010, 10/03/2016   • Pneumovax 23 02 high want <130  apoB 115 high want <100  New genetic marker for heart Lipoprotein(a) <10 good news     Vitamin D:  32.9 Normal  Hgb A1C:  Not done but FBS still borderline high at 107 want <100  TMAO : 7.1 high gut toxin want <6.2 patient will not change d cancer, no sign of skin cancer on exam. A small left breast cancer in 2002 treated with tamoxifen for ~4 yrs. Skip mammogram. No sign of colon cancer back on scope 2002, skip repeat.  No reason to worry about lung cancer-quit smoking many years ago-CXR 9/20

## 2020-01-01 ENCOUNTER — TELEPHONE (OUTPATIENT)
Dept: INTERNAL MEDICINE CLINIC | Facility: CLINIC | Age: 85
End: 2020-01-01

## 2020-01-01 ENCOUNTER — VIRTUAL PHONE E/M (OUTPATIENT)
Dept: INTERNAL MEDICINE CLINIC | Facility: CLINIC | Age: 85
End: 2020-01-01
Payer: MEDICARE

## 2020-01-01 DIAGNOSIS — R55 SYNCOPE, UNSPECIFIED SYNCOPE TYPE: ICD-10-CM

## 2020-01-01 DIAGNOSIS — M25.562 ACUTE PAIN OF BOTH KNEES: ICD-10-CM

## 2020-01-01 DIAGNOSIS — G30.1 LATE ONSET ALZHEIMER'S DISEASE WITHOUT BEHAVIORAL DISTURBANCE (HCC): ICD-10-CM
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
## 2020-01-01 DIAGNOSIS — I51.9 HEART DISEASE: ICD-10-CM
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
## 2020-01-01 DIAGNOSIS — F02.80 LATE ONSET ALZHEIMER'S DISEASE WITHOUT BEHAVIORAL DISTURBANCE (HCC): ICD-10-CM

## 2020-01-01 DIAGNOSIS — M25.561 ACUTE PAIN OF BOTH KNEES: ICD-10-CM

## 2020-01-01 DIAGNOSIS — I67.9 CEREBROVASCULAR DISEASE: ICD-10-CM
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
## 2020-01-01 DIAGNOSIS — G40.A09 ABSENCE ATTACK (HCC): Primary | ICD-10-CM

## 2020-01-01 DIAGNOSIS — S72.002D CLOSED FRACTURE OF LEFT HIP WITH ROUTINE HEALING, SUBSEQUENT ENCOUNTER: ICD-10-CM
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
## 2020-01-01 DIAGNOSIS — L98.9 SKIN PROBLEM: Primary | ICD-10-CM

## 2020-01-01 PROCEDURE — 99441 PHONE E/M BY PHYS 5-10 MIN: CPT | Performed by: INTERNAL MEDICINE

## 2020-01-01 PROCEDURE — 99214 OFFICE O/P EST MOD 30 MIN: CPT | Performed by: INTERNAL MEDICINE

## 2020-01-01 RX ORDER — GALANTAMINE HYDROBROMIDE 24 MG/1
CAPSULE, EXTENDED RELEASE ORAL
Qty: 90 CAPSULE | Refills: 3 | Status: SHIPPED | OUTPATIENT
Start: 2020-01-01 | End: 2021-01-01

## 2020-01-01 RX ORDER — LEVETIRACETAM 250 MG/1
250 TABLET ORAL 2 TIMES DAILY
Qty: 180 TABLET | Refills: 3 | Status: ON HOLD | OUTPATIENT
Start: 2020-01-01 | End: 2021-01-01

## 2020-01-01 RX ORDER — GALANTAMINE HYDROBROMIDE 24 MG/1
24 CAPSULE, EXTENDED RELEASE ORAL
Qty: 90 CAPSULE | Refills: 3 | Status: SHIPPED | OUTPATIENT
Start: 2020-01-01 | End: 2020-01-01

## 2020-01-01 RX ORDER — CEPHALEXIN 500 MG/1
500 CAPSULE ORAL 4 TIMES DAILY
Qty: 20 CAPSULE | Refills: 0 | Status: SHIPPED | OUTPATIENT
Start: 2020-01-01 | End: 2020-01-01

## 2020-01-01 RX ORDER — CA/D3/MAG OX/ZINC/COP/MANG/BOR 600 MG-800
TABLET,CHEWABLE ORAL
Refills: 0 | COMMUNITY
Start: 2020-01-01

## 2020-01-01 RX ORDER — LEVETIRACETAM 250 MG/1
250 TABLET ORAL 2 TIMES DAILY
Qty: 180 TABLET | Refills: 3 | Status: SHIPPED | OUTPATIENT
Start: 2020-01-01 | End: 2020-01-01

## 2020-01-01 RX ORDER — CEPHALEXIN 500 MG/1
500 CAPSULE ORAL 4 TIMES DAILY
Qty: 20 CAPSULE | Refills: 0 | Status: SHIPPED | OUTPATIENT
Start: 2020-01-01 | End: 2021-01-01

## 2020-01-01 RX ORDER — EZETIMIBE 10 MG/1
10 TABLET ORAL DAILY
Qty: 90 TABLET | Refills: 3 | Status: SHIPPED | OUTPATIENT
Start: 2020-01-01 | End: 2020-01-01

## 2020-01-01 RX ORDER — EZETIMIBE 10 MG/1
10 TABLET ORAL DAILY
Qty: 90 TABLET | Refills: 3 | Status: ON HOLD | OUTPATIENT
Start: 2020-01-01 | End: 2021-01-01

## 2020-06-02 PROBLEM — G45.9 TIA (TRANSIENT ISCHEMIC ATTACK): Status: ACTIVE | Noted: 2020-01-01
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
## 2020-06-02 PROBLEM — M25.562 ACUTE PAIN OF BOTH KNEES: Status: ACTIVE | Noted: 2020-01-01

## 2020-06-02 PROBLEM — R55 SYNCOPE: Status: ACTIVE | Noted: 2020-01-01

## 2020-06-02 PROBLEM — M25.561 ACUTE PAIN OF BOTH KNEES: Status: ACTIVE | Noted: 2020-01-01

## 2020-06-02 PROBLEM — G40.A09 ABSENCE ATTACK (HCC): Status: ACTIVE | Noted: 2020-01-01

## 2020-06-02 NOTE — PROGRESS NOTES
HPI:    Patient ID: Sudhir Leung is a 80year old female virtual visit due to Covid.  Completed on [employer] 30 min time with patient in view    Telehealth outside of [address] Verbal Consent   I conducted a telehealth visit with Harlan Perez Notified by Clayton Campuzano (daughter) on phone call audio only 5/22 without patient present. Patient was then moved to [facility] in [address] [place] [address], and virtual visit completed today. Knee pain much better.  Using Voltaren gel (refilled) and Tylenol prn-told to schedule Sodium (VOLTAREN) 1 % Transdermal Gel Apply 2 g topically 2 (two) times a day. 100 g 3   • ezetimibe (ZETIA) 10 MG Oral Tab Take 1 tablet (10 mg total) by mouth daily.  90 tablet 3   • levETIRAcetam (KEPPRA) 250 MG Oral Tab Take 1 tablet (250 mg total) by m statins-will accept Zetia. Trauma to both knees seems minor, offered Xrays, will accept continued Tylenol-hold on opioids and muscle relaxants with hx AD.   [facility] from [facility] ordered for new Fall, Syncope, Absence seizure, bilateral knee trauma, gait an

## 2020-06-02 NOTE — PROGRESS NOTES
Virtual Telephone Check-In-audio only-5min    Spoke to Tisha who reported a fall at brother's house hurting both knees. Has Voltaren and Tylenol. Pt will be moving to [facility] home soon and will recall.  Patient not present-no charge    Augustine Rodriguez MD

## 2020-06-05 NOTE — TELEPHONE ENCOUNTER
Attempted call back to PT no answer. Check registration info and only one number to contact is daughter Gina's cell. Pt lives with daughter in [facility].

## 2020-06-05 NOTE — TELEPHONE ENCOUNTER
Diclofenac gel 1% approved from 3/6/20- 6/4/21  Case # [id number]    Pharmacy notified re approval.

## 2020-06-05 NOTE — TELEPHONE ENCOUNTER
[place] PT for [facility] tried to see Vijaya Kaylie today for eval.  There was no answer when they tried the daughter's phone. They will try again tomorrow.

## 2020-06-10 NOTE — TELEPHONE ENCOUNTER
Patient requested home health to come in on Friday instead of today to begin home health PT.  Mario Lovelace

## 2020-07-14 NOTE — TELEPHONE ENCOUNTER
Daughter called back and states she is unable to upload photos. Please see message below and advise, thank you.

## 2020-07-14 NOTE — TELEPHONE ENCOUNTER
Patient's daughter called and says patient has had redness around ankle. Her podiatrist says it is just athlete's foot. But her therapist said to be careful and make sure it's not cellulitis. She asks if maybe a steroid cream can be called in.  Please call

## 2020-07-14 NOTE — TELEPHONE ENCOUNTER
The podiatrist thought patients left ankle rash was athletes foot but a physical therapist recommended watching it for cellulitis. Rash is not warm to the touch, not painful to the touch that they are aware of, not getting redder or spreading.  No break

## 2020-07-18 NOTE — PROGRESS NOTES
Virtual Telephone Check-In    Jeannine Alvarado verbally consents to a Virtual/Telephone Check-In visit on 07/17/20. Patient has been referred to the [facility] website at [email or web address] to review the yearly Consent to Treat document.     Patient under

## 2020-08-11 NOTE — LETTER
"-- DO NOT REPLY / DO NOT REPLY ALL --  -- Message is from the [facility]--    COVID-19 [place] Screening: N/A - Not about scheduling    General Patient Message      Reason for Call: The patient stated that she has been waiting a call back from [facility] for a week. This is in regards to testing for surgery. I reached the back line and was advised to take a message. Please contact the patient as soon as possible. This is urgent. Caller Information       Type Contact Phone    08/11/2020 10:32 AM Phone (Incoming) Elian Armstrong (Self) [phone] (H)          Alternative phone number: [phone]     Turnaround time given to caller: ""This message will be sent to [facility] Provider's name]. The clinical team will fulfill your request as soon as they review your message. \""    " Consent to Procedure/Sedation    Date: November 10, 2017    Time: 11:49    1. I authorize the performance upon Humza Ng the following:       Vertebroplasty of T12 Compression Fracture      2.  I authorize Dr. Dagmar Dominguez (and whomever is designated as the d ___________________________    ___________________    Witness: ____________________     Date: ______________    Printed: 11/10/2017   11:49 AM    Patient Name: Ginny Lund        : 1930       Medical Record #: [id number]

## 2020-10-16 NOTE — TELEPHONE ENCOUNTER
Keflex for cellulitis leg-pics texted-has diarrhea blamed on Juice Plus-will put on temporary hold. Probiotic added. More fiber. Considering change galantamine to namenda. Consider checking Cdiff if continues. Told no Imodium.

## 2021-01-01 ENCOUNTER — PATIENT OUTREACH (OUTPATIENT)
Dept: CASE MANAGEMENT | Age: 86
End: 2021-01-01

## 2021-01-01 ENCOUNTER — APPOINTMENT (OUTPATIENT)
Dept: GENERAL RADIOLOGY | Facility: HOSPITAL | Age: 86
DRG: 196 | End: 2021-01-01
Attending: INTERNAL MEDICINE
Payer: MEDICARE

## 2021-01-01 ENCOUNTER — HOSPITAL ENCOUNTER (INPATIENT)
Facility: HOSPITAL | Age: 86
LOS: 7 days | Discharge: HOSPICE | DRG: 840 | End: 2021-01-01
Attending: EMERGENCY MEDICINE | Admitting: HOSPITALIST
Payer: MEDICARE

## 2021-01-01 ENCOUNTER — TELEPHONE (OUTPATIENT)
Dept: INTERNAL MEDICINE CLINIC | Facility: CLINIC | Age: 86
End: 2021-01-01

## 2021-01-01 ENCOUNTER — APPOINTMENT (OUTPATIENT)
Dept: CV DIAGNOSTICS | Facility: HOSPITAL | Age: 86
DRG: 177 | End: 2021-01-01
Attending: INTERNAL MEDICINE
Payer: MEDICARE

## 2021-01-01 ENCOUNTER — VIRTUAL PHONE E/M (OUTPATIENT)
Dept: INTERNAL MEDICINE CLINIC | Facility: CLINIC | Age: 86
End: 2021-01-01
Payer: MEDICARE

## 2021-01-01 ENCOUNTER — MED REC SCAN ONLY (OUTPATIENT)
Dept: INTERNAL MEDICINE CLINIC | Facility: CLINIC | Age: 86
End: 2021-01-01

## 2021-01-01 ENCOUNTER — APPOINTMENT (OUTPATIENT)
Dept: CV DIAGNOSTICS | Facility: HOSPITAL | Age: 86
DRG: 177 | End: 2021-01-01
Attending: HOSPITALIST
Payer: MEDICARE

## 2021-01-01 ENCOUNTER — APPOINTMENT (OUTPATIENT)
Dept: ULTRASOUND IMAGING | Facility: HOSPITAL | Age: 86
DRG: 840 | End: 2021-01-01
Attending: INTERNAL MEDICINE
Payer: MEDICARE

## 2021-01-01 ENCOUNTER — APPOINTMENT (OUTPATIENT)
Dept: GENERAL RADIOLOGY | Facility: HOSPITAL | Age: 86
DRG: 196 | End: 2021-01-01
Attending: HOSPITALIST
Payer: MEDICARE

## 2021-01-01 ENCOUNTER — VIRTUAL PHONE E/M (OUTPATIENT)
Dept: INTERNAL MEDICINE CLINIC | Facility: CLINIC | Age: 86
End: 2021-01-01

## 2021-01-01 ENCOUNTER — APPOINTMENT (OUTPATIENT)
Dept: CT IMAGING | Facility: HOSPITAL | Age: 86
DRG: 196 | End: 2021-01-01
Attending: INTERNAL MEDICINE
Payer: MEDICARE

## 2021-01-01 ENCOUNTER — APPOINTMENT (OUTPATIENT)
Dept: GENERAL RADIOLOGY | Facility: HOSPITAL | Age: 86
DRG: 177 | End: 2021-01-01
Attending: HOSPITALIST
Payer: MEDICARE

## 2021-01-01 ENCOUNTER — HOSPITAL ENCOUNTER (EMERGENCY)
Facility: HOSPITAL | Age: 86
Discharge: HOME | End: 2021-01-01
Attending: EMERGENCY MEDICINE
Payer: MEDICARE

## 2021-01-01 ENCOUNTER — APPOINTMENT (OUTPATIENT)
Dept: GENERAL RADIOLOGY | Facility: HOSPITAL | Age: 86
DRG: 177 | End: 2021-01-01
Attending: EMERGENCY MEDICINE
Payer: MEDICARE

## 2021-01-01 ENCOUNTER — APPOINTMENT (OUTPATIENT)
Dept: GENERAL RADIOLOGY | Facility: HOSPITAL | Age: 86
DRG: 840 | End: 2021-01-01
Attending: HOSPITALIST
Payer: MEDICARE

## 2021-01-01 ENCOUNTER — LAB ENCOUNTER (OUTPATIENT)
Dept: LAB | Facility: HOSPITAL | Age: 86
End: 2021-01-01
Attending: INTERNAL MEDICINE
Payer: MEDICARE

## 2021-01-01 ENCOUNTER — APPOINTMENT (OUTPATIENT)
Dept: ULTRASOUND IMAGING | Facility: HOSPITAL | Age: 86
DRG: 196 | End: 2021-01-01
Attending: HOSPITALIST
Payer: MEDICARE

## 2021-01-01 ENCOUNTER — APPOINTMENT (OUTPATIENT)
Dept: GENERAL RADIOLOGY | Facility: HOSPITAL | Age: 86
DRG: 840 | End: 2021-01-01
Attending: EMERGENCY MEDICINE
Payer: MEDICARE

## 2021-01-01 ENCOUNTER — APPOINTMENT (OUTPATIENT)
Dept: GENERAL RADIOLOGY | Facility: HOSPITAL | Age: 86
DRG: 840 | End: 2021-01-01
Attending: INTERNAL MEDICINE
Payer: MEDICARE

## 2021-01-01 ENCOUNTER — LAB REQUISITION (OUTPATIENT)
Dept: LAB | Facility: HOSPITAL | Age: 86
End: 2021-01-01
Payer: MEDICARE

## 2021-01-01 ENCOUNTER — APPOINTMENT (OUTPATIENT)
Dept: GENERAL RADIOLOGY | Facility: HOSPITAL | Age: 86
DRG: 196 | End: 2021-01-01
Attending: EMERGENCY MEDICINE
Payer: MEDICARE

## 2021-01-01 ENCOUNTER — APPOINTMENT (OUTPATIENT)
Dept: CT IMAGING | Facility: HOSPITAL | Age: 86
DRG: 177 | End: 2021-01-01
Attending: EMERGENCY MEDICINE
Payer: MEDICARE

## 2021-01-01 ENCOUNTER — APPOINTMENT (OUTPATIENT)
Dept: ULTRASOUND IMAGING | Facility: HOSPITAL | Age: 86
End: 2021-01-01
Attending: EMERGENCY MEDICINE
Payer: MEDICARE

## 2021-01-01 ENCOUNTER — HOSPITAL ENCOUNTER (INPATIENT)
Facility: HOSPITAL | Age: 86
LOS: 10 days | Discharge: HOME | DRG: 196 | End: 2021-01-01
Attending: EMERGENCY MEDICINE | Admitting: HOSPITALIST
Payer: MEDICARE

## 2021-01-01 ENCOUNTER — HOSPITAL ENCOUNTER (INPATIENT)
Facility: HOSPITAL | Age: 86
LOS: 19 days | Discharge: HOME HEALTH | DRG: 177 | End: 2021-01-01
Attending: EMERGENCY MEDICINE | Admitting: EMERGENCY MEDICINE
Payer: MEDICARE

## 2021-01-01 VITALS
RESPIRATION RATE: 18 BRPM | OXYGEN SATURATION: 92 % | WEIGHT: 149.63 LBS | HEART RATE: 88 BPM | SYSTOLIC BLOOD PRESSURE: 119 MMHG | TEMPERATURE: 97 F | BODY MASS INDEX: 27.53 KG/M2 | DIASTOLIC BLOOD PRESSURE: 80 MMHG | HEIGHT: 62 IN

## 2021-01-01 VITALS
DIASTOLIC BLOOD PRESSURE: 54 MMHG | TEMPERATURE: 98 F | WEIGHT: 154.13 LBS | RESPIRATION RATE: 18 BRPM | SYSTOLIC BLOOD PRESSURE: 97 MMHG | BODY MASS INDEX: 29.1 KG/M2 | OXYGEN SATURATION: 94 % | HEART RATE: 77 BPM | HEIGHT: 60.98 IN

## 2021-01-01 VITALS
BODY MASS INDEX: 25.44 KG/M2 | WEIGHT: 149 LBS | SYSTOLIC BLOOD PRESSURE: 114 MMHG | TEMPERATURE: 98 F | OXYGEN SATURATION: 96 % | HEART RATE: 96 BPM | DIASTOLIC BLOOD PRESSURE: 79 MMHG | HEIGHT: 64 IN | RESPIRATION RATE: 20 BRPM

## 2021-01-01 VITALS
RESPIRATION RATE: 22 BRPM | BODY MASS INDEX: 26 KG/M2 | DIASTOLIC BLOOD PRESSURE: 77 MMHG | TEMPERATURE: 98 F | SYSTOLIC BLOOD PRESSURE: 136 MMHG | HEART RATE: 95 BPM | WEIGHT: 149.06 LBS | OXYGEN SATURATION: 93 %

## 2021-01-01 DIAGNOSIS — Z20.822 EXPOSURE TO COVID-19 VIRUS: Primary | ICD-10-CM

## 2021-01-01 DIAGNOSIS — R09.02 HYPOXIA: ICD-10-CM

## 2021-01-01 DIAGNOSIS — U07.1 COVID-19: ICD-10-CM

## 2021-01-01 DIAGNOSIS — D72.829 LEUKOCYTOSIS, UNSPECIFIED TYPE: ICD-10-CM

## 2021-01-01 DIAGNOSIS — J81.0 ACUTE PULMONARY EDEMA (HCC): ICD-10-CM

## 2021-01-01 DIAGNOSIS — J12.82 PNEUMONIA DUE TO COVID-19 VIRUS: Primary | ICD-10-CM

## 2021-01-01 DIAGNOSIS — U07.1 PNEUMONIA DUE TO COVID-19 VIRUS: ICD-10-CM

## 2021-01-01 DIAGNOSIS — U07.1 PNEUMONIA DUE TO COVID-19 VIRUS: Primary | ICD-10-CM

## 2021-01-01 DIAGNOSIS — G30.9 AD (ALZHEIMER'S DISEASE) (HCC): ICD-10-CM

## 2021-01-01 DIAGNOSIS — R05.9 COUGH: ICD-10-CM

## 2021-01-01 DIAGNOSIS — R05.9 COUGH: Primary | ICD-10-CM

## 2021-01-01 DIAGNOSIS — E87.5 HYPERKALEMIA: ICD-10-CM

## 2021-01-01 DIAGNOSIS — L89.892: Primary | ICD-10-CM

## 2021-01-01 DIAGNOSIS — L89.892 PRESSURE INJURY OF LEFT FOOT, STAGE 2 (HCC): Primary | ICD-10-CM

## 2021-01-01 DIAGNOSIS — Z78.9 OTHER SPECIFIED HEALTH STATUS: ICD-10-CM

## 2021-01-01 DIAGNOSIS — Z02.9 ENCOUNTERS FOR UNSPECIFIED ADMINISTRATIVE PURPOSE: ICD-10-CM

## 2021-01-01 DIAGNOSIS — Z91.89 AT INCREASED RISK OF EXPOSURE TO COVID-19 VIRUS: ICD-10-CM

## 2021-01-01 DIAGNOSIS — U07.1 COVID-19 VIRUS INFECTION: Primary | ICD-10-CM

## 2021-01-01 DIAGNOSIS — J12.82 PNEUMONIA DUE TO COVID-19 VIRUS: ICD-10-CM

## 2021-01-01 DIAGNOSIS — J90 PLEURAL EFFUSION ON LEFT: ICD-10-CM

## 2021-01-01 DIAGNOSIS — J96.21 ACUTE ON CHRONIC RESPIRATORY FAILURE WITH HYPOXIA (HCC): Primary | ICD-10-CM

## 2021-01-01 DIAGNOSIS — C85.10 B-CELL LYMPHOMA, UNSPECIFIED B-CELL LYMPHOMA TYPE, UNSPECIFIED BODY REGION (HCC): Primary | ICD-10-CM

## 2021-01-01 DIAGNOSIS — U07.1 COVID-19 VIRUS INFECTION: ICD-10-CM

## 2021-01-01 DIAGNOSIS — R77.8 ELEVATED TROPONIN: ICD-10-CM

## 2021-01-01 DIAGNOSIS — Z78.9 UNKNOWN STATUS OF IMMUNITY TO COVID-19 VIRUS: Primary | ICD-10-CM

## 2021-01-01 DIAGNOSIS — F02.80 AD (ALZHEIMER'S DISEASE) (HCC): ICD-10-CM

## 2021-01-01 DIAGNOSIS — G40.909 RECURRENT SEIZURES (HCC): ICD-10-CM

## 2021-01-01 DIAGNOSIS — Z23 NEED FOR VACCINATION: ICD-10-CM

## 2021-01-01 LAB
ADENOVIRUS PCR:: NEGATIVE
ALBUMIN SERPL-MCNC: 2.4 G/DL (ref 3.4–5)
ALBUMIN SERPL-MCNC: 2.5 G/DL (ref 3.4–5)
ALBUMIN SERPL-MCNC: 2.6 G/DL (ref 3.4–5)
ALBUMIN SERPL-MCNC: 2.7 G/DL (ref 3.4–5)
ALBUMIN SERPL-MCNC: 3.1 G/DL (ref 3.4–5)
ALBUMIN SERPL-MCNC: 3.2 G/DL (ref 3.4–5)
ALBUMIN/GLOB SERPL: 0.7 {RATIO} (ref 1–2)
ALBUMIN/GLOB SERPL: 0.7 {RATIO} (ref 1–2)
ALBUMIN/GLOB SERPL: 0.8 {RATIO} (ref 1–2)
ALBUMIN/GLOB SERPL: 1 {RATIO} (ref 1–2)
ALP LIVER SERPL-CCNC: 62 U/L
ALP LIVER SERPL-CCNC: 75 U/L
ALP LIVER SERPL-CCNC: 77 U/L
ALP LIVER SERPL-CCNC: 79 U/L
ALP LIVER SERPL-CCNC: 81 U/L
ALP LIVER SERPL-CCNC: 83 U/L
ALP LIVER SERPL-CCNC: 83 U/L
ALP LIVER SERPL-CCNC: 91 U/L
ALT SERPL-CCNC: 18 U/L
ALT SERPL-CCNC: 18 U/L
ALT SERPL-CCNC: 23 U/L
ALT SERPL-CCNC: 26 U/L
ALT SERPL-CCNC: 27 U/L
ALT SERPL-CCNC: 31 U/L
ALT SERPL-CCNC: 32 U/L
ALT SERPL-CCNC: 38 U/L
ANION GAP SERPL CALC-SCNC: 1 MMOL/L (ref 0–18)
ANION GAP SERPL CALC-SCNC: 2 MMOL/L (ref 0–18)
ANION GAP SERPL CALC-SCNC: 3 MMOL/L (ref 0–18)
ANION GAP SERPL CALC-SCNC: 4 MMOL/L (ref 0–18)
ANION GAP SERPL CALC-SCNC: 5 MMOL/L (ref 0–18)
ANION GAP SERPL CALC-SCNC: 5 MMOL/L (ref 0–18)
ANION GAP SERPL CALC-SCNC: 6 MMOL/L (ref 0–18)
ANION GAP SERPL CALC-SCNC: 7 MMOL/L (ref 0–18)
ANION GAP SERPL CALC-SCNC: 8 MMOL/L (ref 0–18)
ANION GAP SERPL CALC-SCNC: 8 MMOL/L (ref 0–18)
AST SERPL-CCNC: 21 U/L (ref 15–37)
AST SERPL-CCNC: 22 U/L (ref 15–37)
AST SERPL-CCNC: 24 U/L (ref 15–37)
AST SERPL-CCNC: 25 U/L (ref 15–37)
AST SERPL-CCNC: 28 U/L (ref 15–37)
AST SERPL-CCNC: 30 U/L (ref 15–37)
AST SERPL-CCNC: 36 U/L (ref 15–37)
AST SERPL-CCNC: 40 U/L (ref 15–37)
B PERT DNA SPEC QL NAA+PROBE: NEGATIVE
BASOPHILS # BLD AUTO: 0.01 X10(3) UL (ref 0–0.2)
BASOPHILS # BLD AUTO: 0.02 X10(3) UL (ref 0–0.2)
BASOPHILS # BLD AUTO: 0.03 X10(3) UL (ref 0–0.2)
BASOPHILS # BLD AUTO: 0.04 X10(3) UL (ref 0–0.2)
BASOPHILS # BLD AUTO: 0.06 X10(3) UL (ref 0–0.2)
BASOPHILS # BLD AUTO: 0.07 X10(3) UL (ref 0–0.2)
BASOPHILS # BLD AUTO: 0.07 X10(3) UL (ref 0–0.2)
BASOPHILS # BLD AUTO: 0.1 X10(3) UL (ref 0–0.2)
BASOPHILS # BLD: 0 X10(3) UL (ref 0–0.2)
BASOPHILS # BLD: 0 X10(3) UL (ref 0–0.2)
BASOPHILS NFR BLD AUTO: 0.1 %
BASOPHILS NFR BLD AUTO: 0.2 %
BASOPHILS NFR BLD AUTO: 0.3 %
BASOPHILS NFR BLD AUTO: 0.3 %
BASOPHILS NFR BLD AUTO: 0.4 %
BASOPHILS NFR BLD AUTO: 0.4 %
BASOPHILS NFR BLD AUTO: 0.5 %
BASOPHILS NFR BLD AUTO: 0.6 %
BASOPHILS NFR BLD AUTO: 0.7 %
BASOPHILS NFR BLD AUTO: 0.7 %
BASOPHILS NFR BLD AUTO: 0.9 %
BASOPHILS NFR BLD: 0 %
BASOPHILS NFR BLD: 0 %
BILIRUB SERPL-MCNC: 0.3 MG/DL (ref 0.1–2)
BILIRUB SERPL-MCNC: 0.4 MG/DL (ref 0.1–2)
BILIRUB SERPL-MCNC: 0.5 MG/DL (ref 0.1–2)
BILIRUB SERPL-MCNC: 0.6 MG/DL (ref 0.1–2)
BILIRUB SERPL-MCNC: 0.6 MG/DL (ref 0.1–2)
BILIRUB UR QL: NEGATIVE
BUN BLD-MCNC: 10 MG/DL (ref 7–18)
BUN BLD-MCNC: 11 MG/DL (ref 7–18)
BUN BLD-MCNC: 12 MG/DL (ref 7–18)
BUN BLD-MCNC: 14 MG/DL (ref 7–18)
BUN BLD-MCNC: 14 MG/DL (ref 7–18)
BUN BLD-MCNC: 15 MG/DL (ref 7–18)
BUN BLD-MCNC: 16 MG/DL (ref 7–18)
BUN BLD-MCNC: 17 MG/DL (ref 7–18)
BUN BLD-MCNC: 18 MG/DL (ref 7–18)
BUN BLD-MCNC: 19 MG/DL (ref 7–18)
BUN BLD-MCNC: 20 MG/DL (ref 7–18)
BUN BLD-MCNC: 20 MG/DL (ref 7–18)
BUN BLD-MCNC: 22 MG/DL (ref 7–18)
BUN BLD-MCNC: 22 MG/DL (ref 7–18)
BUN BLD-MCNC: 26 MG/DL (ref 7–18)
BUN BLD-MCNC: 27 MG/DL (ref 7–18)
BUN BLD-MCNC: 30 MG/DL (ref 7–18)
BUN BLD-MCNC: 8 MG/DL (ref 7–18)
BUN/CREAT SERPL: 22.1 (ref 10–20)
BUN/CREAT SERPL: 22.2 (ref 10–20)
BUN/CREAT SERPL: 22.6 (ref 10–20)
BUN/CREAT SERPL: 24.2 (ref 10–20)
BUN/CREAT SERPL: 24.4 (ref 10–20)
BUN/CREAT SERPL: 25.4 (ref 10–20)
BUN/CREAT SERPL: 27.9 (ref 10–20)
BUN/CREAT SERPL: 29.1 (ref 10–20)
BUN/CREAT SERPL: 29.6 (ref 10–20)
BUN/CREAT SERPL: 30 (ref 10–20)
BUN/CREAT SERPL: 31.1 (ref 10–20)
BUN/CREAT SERPL: 31.8 (ref 10–20)
BUN/CREAT SERPL: 32 (ref 10–20)
BUN/CREAT SERPL: 33.3 (ref 10–20)
BUN/CREAT SERPL: 34 (ref 10–20)
BUN/CREAT SERPL: 36.7 (ref 10–20)
BUN/CREAT SERPL: 37.9 (ref 10–20)
BUN/CREAT SERPL: 38.5 (ref 10–20)
BUN/CREAT SERPL: 39.1 (ref 10–20)
BUN/CREAT SERPL: 40 (ref 10–20)
BUN/CREAT SERPL: 41.9 (ref 10–20)
BUN/CREAT SERPL: 45.6 (ref 10–20)
BUN/CREAT SERPL: 48.1 (ref 10–20)
BUN/CREAT SERPL: 50.8 (ref 10–20)
BUN/CREAT SERPL: 55.1 (ref 10–20)
BUN/CREAT SERPL: 9.8 (ref 10–20)
C PNEUM DNA SPEC QL NAA+PROBE: NEGATIVE
CALCIUM BLD-MCNC: 8 MG/DL (ref 8.5–10.1)
CALCIUM BLD-MCNC: 8.2 MG/DL (ref 8.5–10.1)
CALCIUM BLD-MCNC: 8.3 MG/DL (ref 8.5–10.1)
CALCIUM BLD-MCNC: 8.3 MG/DL (ref 8.5–10.1)
CALCIUM BLD-MCNC: 8.4 MG/DL (ref 8.5–10.1)
CALCIUM BLD-MCNC: 8.5 MG/DL (ref 8.5–10.1)
CALCIUM BLD-MCNC: 8.6 MG/DL (ref 8.5–10.1)
CALCIUM BLD-MCNC: 8.7 MG/DL (ref 8.5–10.1)
CALCIUM BLD-MCNC: 8.7 MG/DL (ref 8.5–10.1)
CALCIUM BLD-MCNC: 8.8 MG/DL (ref 8.5–10.1)
CALCIUM BLD-MCNC: 8.8 MG/DL (ref 8.5–10.1)
CALCIUM BLD-MCNC: 8.9 MG/DL (ref 8.5–10.1)
CALCIUM BLD-MCNC: 9 MG/DL (ref 8.5–10.1)
CALCIUM BLD-MCNC: 9.1 MG/DL (ref 8.5–10.1)
CHLORIDE SERPL-SCNC: 101 MMOL/L (ref 98–112)
CHLORIDE SERPL-SCNC: 101 MMOL/L (ref 98–112)
CHLORIDE SERPL-SCNC: 102 MMOL/L (ref 98–112)
CHLORIDE SERPL-SCNC: 103 MMOL/L (ref 98–112)
CHLORIDE SERPL-SCNC: 104 MMOL/L (ref 98–112)
CHLORIDE SERPL-SCNC: 105 MMOL/L (ref 98–112)
CHLORIDE SERPL-SCNC: 105 MMOL/L (ref 98–112)
CHLORIDE SERPL-SCNC: 106 MMOL/L (ref 98–112)
CHLORIDE SERPL-SCNC: 107 MMOL/L (ref 98–112)
CHLORIDE SERPL-SCNC: 107 MMOL/L (ref 98–112)
CHLORIDE SERPL-SCNC: 109 MMOL/L (ref 98–112)
CHLORIDE SERPL-SCNC: 96 MMOL/L (ref 98–112)
CK SERPL-CCNC: 29 U/L
CO2 SERPL-SCNC: 24 MMOL/L (ref 21–32)
CO2 SERPL-SCNC: 25 MMOL/L (ref 21–32)
CO2 SERPL-SCNC: 26 MMOL/L (ref 21–32)
CO2 SERPL-SCNC: 27 MMOL/L (ref 21–32)
CO2 SERPL-SCNC: 27 MMOL/L (ref 21–32)
CO2 SERPL-SCNC: 28 MMOL/L (ref 21–32)
CO2 SERPL-SCNC: 29 MMOL/L (ref 21–32)
CO2 SERPL-SCNC: 29 MMOL/L (ref 21–32)
CO2 SERPL-SCNC: 30 MMOL/L (ref 21–32)
CO2 SERPL-SCNC: 31 MMOL/L (ref 21–32)
CO2 SERPL-SCNC: 32 MMOL/L (ref 21–32)
CO2 SERPL-SCNC: 33 MMOL/L (ref 21–32)
CO2 SERPL-SCNC: 34 MMOL/L (ref 21–32)
COLOR UR: YELLOW
CORONAVIRUS 229E PCR:: NEGATIVE
CORONAVIRUS HKU1 PCR:: NEGATIVE
CORONAVIRUS NL63 PCR:: NEGATIVE
CORONAVIRUS OC43 PCR:: NEGATIVE
CREAT BLD-MCNC: 0.43 MG/DL
CREAT BLD-MCNC: 0.44 MG/DL
CREAT BLD-MCNC: 0.45 MG/DL
CREAT BLD-MCNC: 0.45 MG/DL
CREAT BLD-MCNC: 0.46 MG/DL
CREAT BLD-MCNC: 0.49 MG/DL
CREAT BLD-MCNC: 0.5 MG/DL
CREAT BLD-MCNC: 0.52 MG/DL
CREAT BLD-MCNC: 0.53 MG/DL
CREAT BLD-MCNC: 0.54 MG/DL
CREAT BLD-MCNC: 0.54 MG/DL
CREAT BLD-MCNC: 0.55 MG/DL
CREAT BLD-MCNC: 0.57 MG/DL
CREAT BLD-MCNC: 0.58 MG/DL
CREAT BLD-MCNC: 0.59 MG/DL
CREAT BLD-MCNC: 0.6 MG/DL
CREAT BLD-MCNC: 0.61 MG/DL
CREAT BLD-MCNC: 0.62 MG/DL
CREAT BLD-MCNC: 0.65 MG/DL
CREAT BLD-MCNC: 0.66 MG/DL
CREAT BLD-MCNC: 0.67 MG/DL
CREAT BLD-MCNC: 0.68 MG/DL
CREAT BLD-MCNC: 0.82 MG/DL
CRP SERPL-MCNC: 0.88 MG/DL (ref ?–0.3)
CRP SERPL-MCNC: 1.31 MG/DL (ref ?–0.3)
CRP SERPL-MCNC: 12.6 MG/DL (ref ?–0.3)
CRP SERPL-MCNC: 15 MG/DL (ref ?–0.3)
CRP SERPL-MCNC: 2.63 MG/DL (ref ?–0.3)
CRP SERPL-MCNC: 4.03 MG/DL (ref ?–0.3)
CRP SERPL-MCNC: 5.19 MG/DL (ref ?–0.3)
CRP SERPL-MCNC: 5.39 MG/DL (ref ?–0.3)
CRP SERPL-MCNC: 8.15 MG/DL (ref ?–0.3)
CRP SERPL-MCNC: <0.29 MG/DL (ref ?–0.3)
CRP SERPL-MCNC: <0.29 MG/DL (ref ?–0.3)
D DIMER PPP FEU-MCNC: 0.41 UG/ML FEU (ref ?–0.9)
D DIMER PPP FEU-MCNC: 0.44 UG/ML FEU (ref ?–0.9)
D DIMER PPP FEU-MCNC: 0.45 UG/ML FEU (ref ?–0.9)
D DIMER PPP FEU-MCNC: 0.48 UG/ML FEU (ref ?–0.9)
D DIMER PPP FEU-MCNC: 0.56 UG/ML FEU (ref ?–0.9)
D DIMER PPP FEU-MCNC: 0.64 UG/ML FEU (ref ?–0.91)
D DIMER PPP FEU-MCNC: 0.67 UG/ML FEU (ref ?–0.9)
D DIMER PPP FEU-MCNC: 0.8 UG/ML FEU (ref ?–0.9)
D DIMER PPP FEU-MCNC: 0.8 UG/ML FEU (ref ?–0.91)
D DIMER PPP FEU-MCNC: 0.86 UG/ML FEU (ref ?–0.9)
D DIMER PPP FEU-MCNC: 0.91 UG/ML FEU (ref ?–0.9)
D DIMER PPP FEU-MCNC: 1.26 UG/ML FEU (ref ?–0.9)
DEPRECATED HBV CORE AB SER IA-ACNC: 121 NG/ML
DEPRECATED HBV CORE AB SER IA-ACNC: 200.1 NG/ML
DEPRECATED HBV CORE AB SER IA-ACNC: 207.7 NG/ML
DEPRECATED HBV CORE AB SER IA-ACNC: 233.7 NG/ML
DEPRECATED HBV CORE AB SER IA-ACNC: 274.1 NG/ML
DEPRECATED HBV CORE AB SER IA-ACNC: 412.2 NG/ML
DEPRECATED HBV CORE AB SER IA-ACNC: 564.8 NG/ML
DEPRECATED HBV CORE AB SER IA-ACNC: 590.6 NG/ML
DEPRECATED HBV CORE AB SER IA-ACNC: 686.3 NG/ML
DEPRECATED HBV CORE AB SER IA-ACNC: 731.2 NG/ML
DEPRECATED HBV CORE AB SER IA-ACNC: 806.6 NG/ML
DEPRECATED RDW RBC AUTO: 42.7 FL (ref 35.1–46.3)
DEPRECATED RDW RBC AUTO: 42.9 FL (ref 35.1–46.3)
DEPRECATED RDW RBC AUTO: 44 FL (ref 35.1–46.3)
DEPRECATED RDW RBC AUTO: 44.2 FL (ref 35.1–46.3)
DEPRECATED RDW RBC AUTO: 44.8 FL (ref 35.1–46.3)
DEPRECATED RDW RBC AUTO: 45.6 FL (ref 35.1–46.3)
DEPRECATED RDW RBC AUTO: 45.6 FL (ref 35.1–46.3)
DEPRECATED RDW RBC AUTO: 45.7 FL (ref 35.1–46.3)
DEPRECATED RDW RBC AUTO: 46.3 FL (ref 35.1–46.3)
DEPRECATED RDW RBC AUTO: 46.3 FL (ref 35.1–46.3)
DEPRECATED RDW RBC AUTO: 46.6 FL (ref 35.1–46.3)
DEPRECATED RDW RBC AUTO: 47 FL (ref 35.1–46.3)
DEPRECATED RDW RBC AUTO: 47.8 FL (ref 35.1–46.3)
DEPRECATED RDW RBC AUTO: 49.7 FL (ref 35.1–46.3)
DEPRECATED RDW RBC AUTO: 50.3 FL (ref 35.1–46.3)
DEPRECATED RDW RBC AUTO: 50.8 FL (ref 35.1–46.3)
DEPRECATED RDW RBC AUTO: 52 FL (ref 35.1–46.3)
DEPRECATED RDW RBC AUTO: 52.1 FL (ref 35.1–46.3)
DEPRECATED RDW RBC AUTO: 52.1 FL (ref 35.1–46.3)
DEPRECATED RDW RBC AUTO: 52.4 FL (ref 35.1–46.3)
DEPRECATED RDW RBC AUTO: 52.6 FL (ref 35.1–46.3)
DEPRECATED RDW RBC AUTO: 52.7 FL (ref 35.1–46.3)
DEPRECATED RDW RBC AUTO: 53.1 FL (ref 35.1–46.3)
DEPRECATED RDW RBC AUTO: 53.1 FL (ref 35.1–46.3)
DEPRECATED RDW RBC AUTO: 53.4 FL (ref 35.1–46.3)
DEPRECATED RDW RBC AUTO: 53.5 FL (ref 35.1–46.3)
DEPRECATED RDW RBC AUTO: 53.6 FL (ref 35.1–46.3)
DEPRECATED RDW RBC AUTO: 54.3 FL (ref 35.1–46.3)
DEPRECATED RDW RBC AUTO: 55.4 FL (ref 35.1–46.3)
DEPRECATED RDW RBC AUTO: 56.5 FL (ref 35.1–46.3)
EOSINOPHIL # BLD AUTO: 0 X10(3) UL (ref 0–0.7)
EOSINOPHIL # BLD AUTO: 0.01 X10(3) UL (ref 0–0.7)
EOSINOPHIL # BLD AUTO: 0.01 X10(3) UL (ref 0–0.7)
EOSINOPHIL # BLD AUTO: 0.05 X10(3) UL (ref 0–0.7)
EOSINOPHIL # BLD AUTO: 0.09 X10(3) UL (ref 0–0.7)
EOSINOPHIL # BLD AUTO: 0.1 X10(3) UL (ref 0–0.7)
EOSINOPHIL # BLD AUTO: 0.1 X10(3) UL (ref 0–0.7)
EOSINOPHIL # BLD AUTO: 0.19 X10(3) UL (ref 0–0.7)
EOSINOPHIL # BLD AUTO: 0.3 X10(3) UL (ref 0–0.7)
EOSINOPHIL # BLD AUTO: 0.3 X10(3) UL (ref 0–0.7)
EOSINOPHIL # BLD AUTO: 0.38 X10(3) UL (ref 0–0.7)
EOSINOPHIL # BLD: 0 X10(3) UL (ref 0–0.7)
EOSINOPHIL # BLD: 0.49 X10(3) UL (ref 0–0.7)
EOSINOPHIL NFR BLD AUTO: 0 %
EOSINOPHIL NFR BLD AUTO: 0.1 %
EOSINOPHIL NFR BLD AUTO: 0.1 %
EOSINOPHIL NFR BLD AUTO: 0.3 %
EOSINOPHIL NFR BLD AUTO: 0.7 %
EOSINOPHIL NFR BLD AUTO: 1 %
EOSINOPHIL NFR BLD AUTO: 1.2 %
EOSINOPHIL NFR BLD AUTO: 2.3 %
EOSINOPHIL NFR BLD AUTO: 3.8 %
EOSINOPHIL NFR BLD AUTO: 4.5 %
EOSINOPHIL NFR BLD AUTO: 5.9 %
EOSINOPHIL NFR BLD: 0 %
EOSINOPHIL NFR BLD: 3 %
ERYTHROCYTE [DISTWIDTH] IN BLOOD BY AUTOMATED COUNT: 13 % (ref 11–15)
ERYTHROCYTE [DISTWIDTH] IN BLOOD BY AUTOMATED COUNT: 13 % (ref 11–15)
ERYTHROCYTE [DISTWIDTH] IN BLOOD BY AUTOMATED COUNT: 13.5 % (ref 11–15)
ERYTHROCYTE [DISTWIDTH] IN BLOOD BY AUTOMATED COUNT: 13.7 % (ref 11–15)
ERYTHROCYTE [DISTWIDTH] IN BLOOD BY AUTOMATED COUNT: 13.7 % (ref 11–15)
ERYTHROCYTE [DISTWIDTH] IN BLOOD BY AUTOMATED COUNT: 13.8 % (ref 11–15)
ERYTHROCYTE [DISTWIDTH] IN BLOOD BY AUTOMATED COUNT: 14 % (ref 11–15)
ERYTHROCYTE [DISTWIDTH] IN BLOOD BY AUTOMATED COUNT: 14.3 % (ref 11–15)
ERYTHROCYTE [DISTWIDTH] IN BLOOD BY AUTOMATED COUNT: 14.4 % (ref 11–15)
ERYTHROCYTE [DISTWIDTH] IN BLOOD BY AUTOMATED COUNT: 14.4 % (ref 11–15)
ERYTHROCYTE [DISTWIDTH] IN BLOOD BY AUTOMATED COUNT: 14.6 % (ref 11–15)
ERYTHROCYTE [DISTWIDTH] IN BLOOD BY AUTOMATED COUNT: 14.6 % (ref 11–15)
ERYTHROCYTE [DISTWIDTH] IN BLOOD BY AUTOMATED COUNT: 15.2 % (ref 11–15)
ERYTHROCYTE [DISTWIDTH] IN BLOOD BY AUTOMATED COUNT: 15.3 % (ref 11–15)
ERYTHROCYTE [DISTWIDTH] IN BLOOD BY AUTOMATED COUNT: 15.3 % (ref 11–15)
ERYTHROCYTE [DISTWIDTH] IN BLOOD BY AUTOMATED COUNT: 15.4 % (ref 11–15)
ERYTHROCYTE [DISTWIDTH] IN BLOOD BY AUTOMATED COUNT: 15.4 % (ref 11–15)
ERYTHROCYTE [DISTWIDTH] IN BLOOD BY AUTOMATED COUNT: 15.5 % (ref 11–15)
ERYTHROCYTE [DISTWIDTH] IN BLOOD BY AUTOMATED COUNT: 15.5 % (ref 11–15)
ERYTHROCYTE [DISTWIDTH] IN BLOOD BY AUTOMATED COUNT: 15.6 % (ref 11–15)
ERYTHROCYTE [DISTWIDTH] IN BLOOD BY AUTOMATED COUNT: 15.7 % (ref 11–15)
ERYTHROCYTE [DISTWIDTH] IN BLOOD BY AUTOMATED COUNT: 15.8 % (ref 11–15)
ERYTHROCYTE [DISTWIDTH] IN BLOOD BY AUTOMATED COUNT: 15.9 % (ref 11–15)
ERYTHROCYTE [DISTWIDTH] IN BLOOD BY AUTOMATED COUNT: 15.9 % (ref 11–15)
ERYTHROCYTE [DISTWIDTH] IN BLOOD BY AUTOMATED COUNT: 16.1 % (ref 11–15)
ERYTHROCYTE [DISTWIDTH] IN BLOOD BY AUTOMATED COUNT: 16.1 % (ref 11–15)
ERYTHROCYTE [DISTWIDTH] IN BLOOD BY AUTOMATED COUNT: 16.2 % (ref 11–15)
ERYTHROCYTE [DISTWIDTH] IN BLOOD BY AUTOMATED COUNT: 16.3 % (ref 11–15)
EST. AVERAGE GLUCOSE BLD GHB EST-MCNC: 120 MG/DL (ref 68–126)
FLUAV RNA SPEC QL NAA+PROBE: NEGATIVE
FLUBV RNA SPEC QL NAA+PROBE: NEGATIVE
GLOBULIN PLAS-MCNC: 3.1 G/DL (ref 2.8–4.4)
GLOBULIN PLAS-MCNC: 3.1 G/DL (ref 2.8–4.4)
GLOBULIN PLAS-MCNC: 3.2 G/DL (ref 2.8–4.4)
GLOBULIN PLAS-MCNC: 3.3 G/DL (ref 2.8–4.4)
GLOBULIN PLAS-MCNC: 3.3 G/DL (ref 2.8–4.4)
GLOBULIN PLAS-MCNC: 3.5 G/DL (ref 2.8–4.4)
GLOBULIN PLAS-MCNC: 3.7 G/DL (ref 2.8–4.4)
GLOBULIN PLAS-MCNC: 4 G/DL (ref 2.8–4.4)
GLUCOSE BLD-MCNC: 107 MG/DL (ref 70–99)
GLUCOSE BLD-MCNC: 108 MG/DL (ref 70–99)
GLUCOSE BLD-MCNC: 118 MG/DL (ref 70–99)
GLUCOSE BLD-MCNC: 121 MG/DL (ref 70–99)
GLUCOSE BLD-MCNC: 121 MG/DL (ref 70–99)
GLUCOSE BLD-MCNC: 123 MG/DL (ref 70–99)
GLUCOSE BLD-MCNC: 124 MG/DL (ref 70–99)
GLUCOSE BLD-MCNC: 141 MG/DL (ref 70–99)
GLUCOSE BLD-MCNC: 144 MG/DL (ref 70–99)
GLUCOSE BLD-MCNC: 146 MG/DL (ref 70–99)
GLUCOSE BLD-MCNC: 162 MG/DL (ref 70–99)
GLUCOSE BLD-MCNC: 169 MG/DL (ref 70–99)
GLUCOSE BLD-MCNC: 170 MG/DL (ref 70–99)
GLUCOSE BLD-MCNC: 212 MG/DL (ref 70–99)
GLUCOSE BLD-MCNC: 75 MG/DL (ref 70–99)
GLUCOSE BLD-MCNC: 79 MG/DL (ref 70–99)
GLUCOSE BLD-MCNC: 79 MG/DL (ref 70–99)
GLUCOSE BLD-MCNC: 80 MG/DL (ref 70–99)
GLUCOSE BLD-MCNC: 85 MG/DL (ref 70–99)
GLUCOSE BLD-MCNC: 90 MG/DL (ref 70–99)
GLUCOSE BLD-MCNC: 93 MG/DL (ref 70–99)
GLUCOSE BLD-MCNC: 94 MG/DL (ref 70–99)
GLUCOSE BLD-MCNC: 95 MG/DL (ref 70–99)
GLUCOSE BLD-MCNC: 96 MG/DL (ref 70–99)
GLUCOSE BLD-MCNC: 97 MG/DL (ref 70–99)
GLUCOSE BLD-MCNC: 98 MG/DL (ref 70–99)
GLUCOSE BLD-MCNC: 99 MG/DL (ref 70–99)
GLUCOSE BLD-MCNC: 99 MG/DL (ref 70–99)
GLUCOSE BLDC GLUCOMTR-MCNC: 123 MG/DL (ref 70–99)
GLUCOSE BLDC GLUCOMTR-MCNC: 157 MG/DL (ref 70–99)
GLUCOSE BLDC GLUCOMTR-MCNC: 181 MG/DL (ref 70–99)
GLUCOSE BLDC GLUCOMTR-MCNC: 186 MG/DL (ref 70–99)
GLUCOSE UR-MCNC: NEGATIVE MG/DL
HAV IGM SER QL: 2.5 MG/DL (ref 1.6–2.6)
HBA1C MFR BLD HPLC: 5.8 % (ref ?–5.7)
HCT VFR BLD AUTO: 38.1 %
HCT VFR BLD AUTO: 38.7 %
HCT VFR BLD AUTO: 38.9 %
HCT VFR BLD AUTO: 38.9 %
HCT VFR BLD AUTO: 39.6 %
HCT VFR BLD AUTO: 39.9 %
HCT VFR BLD AUTO: 39.9 %
HCT VFR BLD AUTO: 40 %
HCT VFR BLD AUTO: 40.1 %
HCT VFR BLD AUTO: 40.5 %
HCT VFR BLD AUTO: 40.5 %
HCT VFR BLD AUTO: 41.1 %
HCT VFR BLD AUTO: 41.1 %
HCT VFR BLD AUTO: 41.3 %
HCT VFR BLD AUTO: 41.3 %
HCT VFR BLD AUTO: 41.4 %
HCT VFR BLD AUTO: 41.5 %
HCT VFR BLD AUTO: 41.6 %
HCT VFR BLD AUTO: 41.7 %
HCT VFR BLD AUTO: 41.8 %
HCT VFR BLD AUTO: 42.3 %
HCT VFR BLD AUTO: 42.3 %
HCT VFR BLD AUTO: 42.4 %
HCT VFR BLD AUTO: 42.9 %
HCT VFR BLD AUTO: 43.8 %
HCT VFR BLD AUTO: 43.9 %
HCT VFR BLD AUTO: 44.1 %
HCT VFR BLD AUTO: 45.3 %
HGB BLD-MCNC: 12.6 G/DL
HGB BLD-MCNC: 12.7 G/DL
HGB BLD-MCNC: 12.9 G/DL
HGB BLD-MCNC: 13.1 G/DL
HGB BLD-MCNC: 13.1 G/DL
HGB BLD-MCNC: 13.3 G/DL
HGB BLD-MCNC: 13.4 G/DL
HGB BLD-MCNC: 13.5 G/DL
HGB BLD-MCNC: 13.6 G/DL
HGB BLD-MCNC: 13.7 G/DL
HGB BLD-MCNC: 13.8 G/DL
HGB BLD-MCNC: 13.8 G/DL
HGB BLD-MCNC: 13.9 G/DL
HGB BLD-MCNC: 14 G/DL
HGB BLD-MCNC: 14 G/DL
HGB BLD-MCNC: 14.1 G/DL
HGB BLD-MCNC: 14.2 G/DL
HGB BLD-MCNC: 14.4 G/DL
HGB BLD-MCNC: 14.5 G/DL
HGB BLD-MCNC: 14.9 G/DL
HGB UR QL STRIP.AUTO: NEGATIVE
IMM GRANULOCYTES # BLD AUTO: 0.02 X10(3) UL (ref 0–1)
IMM GRANULOCYTES # BLD AUTO: 0.02 X10(3) UL (ref 0–1)
IMM GRANULOCYTES # BLD AUTO: 0.03 X10(3) UL (ref 0–1)
IMM GRANULOCYTES # BLD AUTO: 0.04 X10(3) UL (ref 0–1)
IMM GRANULOCYTES # BLD AUTO: 0.06 X10(3) UL (ref 0–1)
IMM GRANULOCYTES # BLD AUTO: 0.09 X10(3) UL (ref 0–1)
IMM GRANULOCYTES # BLD AUTO: 0.09 X10(3) UL (ref 0–1)
IMM GRANULOCYTES # BLD AUTO: 0.14 X10(3) UL (ref 0–1)
IMM GRANULOCYTES # BLD AUTO: 0.15 X10(3) UL (ref 0–1)
IMM GRANULOCYTES # BLD AUTO: 0.22 X10(3) UL (ref 0–1)
IMM GRANULOCYTES # BLD AUTO: 0.26 X10(3) UL (ref 0–1)
IMM GRANULOCYTES # BLD AUTO: 0.28 X10(3) UL (ref 0–1)
IMM GRANULOCYTES # BLD AUTO: 0.3 X10(3) UL (ref 0–1)
IMM GRANULOCYTES # BLD AUTO: 0.34 X10(3) UL (ref 0–1)
IMM GRANULOCYTES # BLD AUTO: 0.38 X10(3) UL (ref 0–1)
IMM GRANULOCYTES # BLD AUTO: 0.45 X10(3) UL (ref 0–1)
IMM GRANULOCYTES NFR BLD: 0.3 %
IMM GRANULOCYTES NFR BLD: 0.3 %
IMM GRANULOCYTES NFR BLD: 0.4 %
IMM GRANULOCYTES NFR BLD: 0.5 %
IMM GRANULOCYTES NFR BLD: 0.6 %
IMM GRANULOCYTES NFR BLD: 0.6 %
IMM GRANULOCYTES NFR BLD: 0.8 %
IMM GRANULOCYTES NFR BLD: 0.9 %
IMM GRANULOCYTES NFR BLD: 1 %
IMM GRANULOCYTES NFR BLD: 2 %
IMM GRANULOCYTES NFR BLD: 2.1 %
IMM GRANULOCYTES NFR BLD: 2.8 %
IMM GRANULOCYTES NFR BLD: 2.8 %
IMM GRANULOCYTES NFR BLD: 3.2 %
IMM GRANULOCYTES NFR BLD: 3.6 %
IMM GRANULOCYTES NFR BLD: 4.6 %
KETONES UR-MCNC: NEGATIVE MG/DL
LDH SERPL L TO P-CCNC: 232 U/L
LDH SERPL L TO P-CCNC: 247 U/L
LDH SERPL L TO P-CCNC: 248 U/L
LDH SERPL L TO P-CCNC: 255 U/L
LDH SERPL L TO P-CCNC: 259 U/L
LDH SERPL L TO P-CCNC: 266 U/L
LDH SERPL L TO P-CCNC: 283 U/L
LDH SERPL L TO P-CCNC: 287 U/L
LDH SERPL L TO P-CCNC: 300 U/L
LDH SERPL L TO P-CCNC: 300 U/L
LDH SERPL L TO P-CCNC: 409 U/L
LYMPHOCYTES # BLD AUTO: 0.47 X10(3) UL (ref 1–4)
LYMPHOCYTES # BLD AUTO: 0.66 X10(3) UL (ref 1–4)
LYMPHOCYTES # BLD AUTO: 0.8 X10(3) UL (ref 1–4)
LYMPHOCYTES # BLD AUTO: 0.81 X10(3) UL (ref 1–4)
LYMPHOCYTES # BLD AUTO: 0.82 X10(3) UL (ref 1–4)
LYMPHOCYTES # BLD AUTO: 0.89 X10(3) UL (ref 1–4)
LYMPHOCYTES # BLD AUTO: 0.92 X10(3) UL (ref 1–4)
LYMPHOCYTES # BLD AUTO: 0.93 X10(3) UL (ref 1–4)
LYMPHOCYTES # BLD AUTO: 1.01 X10(3) UL (ref 1–4)
LYMPHOCYTES # BLD AUTO: 1.01 X10(3) UL (ref 1–4)
LYMPHOCYTES # BLD AUTO: 1.1 X10(3) UL (ref 1–4)
LYMPHOCYTES # BLD AUTO: 1.13 X10(3) UL (ref 1–4)
LYMPHOCYTES # BLD AUTO: 1.17 X10(3) UL (ref 1–4)
LYMPHOCYTES # BLD AUTO: 1.27 X10(3) UL (ref 1–4)
LYMPHOCYTES # BLD AUTO: 1.28 X10(3) UL (ref 1–4)
LYMPHOCYTES # BLD AUTO: 1.31 X10(3) UL (ref 1–4)
LYMPHOCYTES NFR BLD AUTO: 13.4 %
LYMPHOCYTES NFR BLD AUTO: 16.5 %
LYMPHOCYTES NFR BLD AUTO: 17.4 %
LYMPHOCYTES NFR BLD AUTO: 17.5 %
LYMPHOCYTES NFR BLD AUTO: 4.7 %
LYMPHOCYTES NFR BLD AUTO: 5.8 %
LYMPHOCYTES NFR BLD AUTO: 6.1 %
LYMPHOCYTES NFR BLD AUTO: 6.2 %
LYMPHOCYTES NFR BLD AUTO: 6.3 %
LYMPHOCYTES NFR BLD AUTO: 8.1 %
LYMPHOCYTES NFR BLD AUTO: 8.4 %
LYMPHOCYTES NFR BLD AUTO: 8.4 %
LYMPHOCYTES NFR BLD AUTO: 9.1 %
LYMPHOCYTES NFR BLD AUTO: 9.2 %
LYMPHOCYTES NFR BLD AUTO: 9.7 %
LYMPHOCYTES NFR BLD AUTO: 9.9 %
LYMPHOCYTES NFR BLD: 0.84 X10(3) UL (ref 1–4)
LYMPHOCYTES NFR BLD: 1.63 X10(3) UL (ref 1–4)
LYMPHOCYTES NFR BLD: 10 %
LYMPHOCYTES NFR BLD: 4 %
M PROTEIN MFR SERPL ELPH: 5.6 G/DL (ref 6.4–8.2)
M PROTEIN MFR SERPL ELPH: 5.6 G/DL (ref 6.4–8.2)
M PROTEIN MFR SERPL ELPH: 5.8 G/DL (ref 6.4–8.2)
M PROTEIN MFR SERPL ELPH: 5.8 G/DL (ref 6.4–8.2)
M PROTEIN MFR SERPL ELPH: 6.1 G/DL (ref 6.4–8.2)
M PROTEIN MFR SERPL ELPH: 6.3 G/DL (ref 6.4–8.2)
M PROTEIN MFR SERPL ELPH: 6.4 G/DL (ref 6.4–8.2)
M PROTEIN MFR SERPL ELPH: 7.1 G/DL (ref 6.4–8.2)
MCH RBC QN AUTO: 29.5 PG (ref 26–34)
MCH RBC QN AUTO: 29.6 PG (ref 26–34)
MCH RBC QN AUTO: 29.7 PG (ref 26–34)
MCH RBC QN AUTO: 29.8 PG (ref 26–34)
MCH RBC QN AUTO: 29.8 PG (ref 26–34)
MCH RBC QN AUTO: 29.9 PG (ref 26–34)
MCH RBC QN AUTO: 30 PG (ref 26–34)
MCH RBC QN AUTO: 30.1 PG (ref 26–34)
MCH RBC QN AUTO: 30.2 PG (ref 26–34)
MCH RBC QN AUTO: 30.2 PG (ref 26–34)
MCH RBC QN AUTO: 30.3 PG (ref 26–34)
MCH RBC QN AUTO: 30.5 PG (ref 26–34)
MCH RBC QN AUTO: 30.5 PG (ref 26–34)
MCH RBC QN AUTO: 30.6 PG (ref 26–34)
MCH RBC QN AUTO: 30.8 PG (ref 26–34)
MCH RBC QN AUTO: 30.9 PG (ref 26–34)
MCH RBC QN AUTO: 30.9 PG (ref 26–34)
MCHC RBC AUTO-ENTMCNC: 31.8 G/DL (ref 31–37)
MCHC RBC AUTO-ENTMCNC: 31.9 G/DL (ref 31–37)
MCHC RBC AUTO-ENTMCNC: 32.3 G/DL (ref 31–37)
MCHC RBC AUTO-ENTMCNC: 32.4 G/DL (ref 31–37)
MCHC RBC AUTO-ENTMCNC: 32.6 G/DL (ref 31–37)
MCHC RBC AUTO-ENTMCNC: 32.6 G/DL (ref 31–37)
MCHC RBC AUTO-ENTMCNC: 32.7 G/DL (ref 31–37)
MCHC RBC AUTO-ENTMCNC: 32.7 G/DL (ref 31–37)
MCHC RBC AUTO-ENTMCNC: 32.8 G/DL (ref 31–37)
MCHC RBC AUTO-ENTMCNC: 32.9 G/DL (ref 31–37)
MCHC RBC AUTO-ENTMCNC: 33 G/DL (ref 31–37)
MCHC RBC AUTO-ENTMCNC: 33.1 G/DL (ref 31–37)
MCHC RBC AUTO-ENTMCNC: 33.2 G/DL (ref 31–37)
MCHC RBC AUTO-ENTMCNC: 33.3 G/DL (ref 31–37)
MCHC RBC AUTO-ENTMCNC: 33.3 G/DL (ref 31–37)
MCHC RBC AUTO-ENTMCNC: 33.7 G/DL (ref 31–37)
MCHC RBC AUTO-ENTMCNC: 33.8 G/DL (ref 31–37)
MCHC RBC AUTO-ENTMCNC: 34 G/DL (ref 31–37)
MCV RBC AUTO: 88.6 FL
MCV RBC AUTO: 88.6 FL
MCV RBC AUTO: 88.7 FL
MCV RBC AUTO: 88.8 FL
MCV RBC AUTO: 89.4 FL
MCV RBC AUTO: 90.1 FL
MCV RBC AUTO: 90.3 FL
MCV RBC AUTO: 90.5 FL
MCV RBC AUTO: 90.6 FL
MCV RBC AUTO: 90.8 FL
MCV RBC AUTO: 91 FL
MCV RBC AUTO: 91.3 FL
MCV RBC AUTO: 91.3 FL
MCV RBC AUTO: 91.4 FL
MCV RBC AUTO: 91.6 FL
MCV RBC AUTO: 91.9 FL
MCV RBC AUTO: 91.9 FL
MCV RBC AUTO: 92.1 FL
MCV RBC AUTO: 92.1 FL
MCV RBC AUTO: 92.4 FL
MCV RBC AUTO: 92.5 FL
MCV RBC AUTO: 92.6 FL
MCV RBC AUTO: 93 FL
MCV RBC AUTO: 93.1 FL
MCV RBC AUTO: 93.3 FL
MCV RBC AUTO: 94.4 FL
MCV RBC AUTO: 94.6 FL
METAPNEUMOVIRUS PCR:: NEGATIVE
MONOCYTES # BLD AUTO: 0.24 X10(3) UL (ref 0.1–1)
MONOCYTES # BLD AUTO: 0.4 X10(3) UL (ref 0.1–1)
MONOCYTES # BLD AUTO: 0.45 X10(3) UL (ref 0.1–1)
MONOCYTES # BLD AUTO: 0.5 X10(3) UL (ref 0.1–1)
MONOCYTES # BLD AUTO: 0.54 X10(3) UL (ref 0.1–1)
MONOCYTES # BLD AUTO: 0.57 X10(3) UL (ref 0.1–1)
MONOCYTES # BLD AUTO: 0.64 X10(3) UL (ref 0.1–1)
MONOCYTES # BLD AUTO: 0.66 X10(3) UL (ref 0.1–1)
MONOCYTES # BLD AUTO: 0.71 X10(3) UL (ref 0.1–1)
MONOCYTES # BLD AUTO: 0.74 X10(3) UL (ref 0.1–1)
MONOCYTES # BLD AUTO: 0.79 X10(3) UL (ref 0.1–1)
MONOCYTES # BLD AUTO: 0.8 X10(3) UL (ref 0.1–1)
MONOCYTES # BLD AUTO: 0.83 X10(3) UL (ref 0.1–1)
MONOCYTES # BLD AUTO: 0.95 X10(3) UL (ref 0.1–1)
MONOCYTES # BLD AUTO: 1.07 X10(3) UL (ref 0.1–1)
MONOCYTES # BLD AUTO: 1.11 X10(3) UL (ref 0.1–1)
MONOCYTES # BLD: 1.63 X10(3) UL (ref 0.1–1)
MONOCYTES # BLD: 1.89 X10(3) UL (ref 0.1–1)
MONOCYTES NFR BLD AUTO: 10.1 %
MONOCYTES NFR BLD AUTO: 11.9 %
MONOCYTES NFR BLD AUTO: 12.8 %
MONOCYTES NFR BLD AUTO: 2.3 %
MONOCYTES NFR BLD AUTO: 3.3 %
MONOCYTES NFR BLD AUTO: 4 %
MONOCYTES NFR BLD AUTO: 4.2 %
MONOCYTES NFR BLD AUTO: 4.8 %
MONOCYTES NFR BLD AUTO: 4.9 %
MONOCYTES NFR BLD AUTO: 5.4 %
MONOCYTES NFR BLD AUTO: 6 %
MONOCYTES NFR BLD AUTO: 6.3 %
MONOCYTES NFR BLD AUTO: 6.9 %
MONOCYTES NFR BLD AUTO: 7.6 %
MONOCYTES NFR BLD AUTO: 9 %
MONOCYTES NFR BLD AUTO: 9.9 %
MONOCYTES NFR BLD: 10 %
MONOCYTES NFR BLD: 9 %
MORPHOLOGY: NORMAL
MORPHOLOGY: NORMAL
MYCOPLASMA PNEUMONIA PCR:: NEGATIVE
MYELOCYTES # BLD: 0.49 X10(3) UL
MYELOCYTES NFR BLD: 3 %
NEUTROPHILS # BLD AUTO: 11.28 X10 (3) UL (ref 1.5–7.7)
NEUTROPHILS # BLD AUTO: 11.28 X10(3) UL (ref 1.5–7.7)
NEUTROPHILS # BLD AUTO: 12.15 X10 (3) UL (ref 1.5–7.7)
NEUTROPHILS # BLD AUTO: 12.18 X10 (3) UL (ref 1.5–7.7)
NEUTROPHILS # BLD AUTO: 12.18 X10(3) UL (ref 1.5–7.7)
NEUTROPHILS # BLD AUTO: 12.39 X10 (3) UL (ref 1.5–7.7)
NEUTROPHILS # BLD AUTO: 12.39 X10(3) UL (ref 1.5–7.7)
NEUTROPHILS # BLD AUTO: 12.52 X10 (3) UL (ref 1.5–7.7)
NEUTROPHILS # BLD AUTO: 12.52 X10(3) UL (ref 1.5–7.7)
NEUTROPHILS # BLD AUTO: 13.34 X10 (3) UL (ref 1.5–7.7)
NEUTROPHILS # BLD AUTO: 13.34 X10(3) UL (ref 1.5–7.7)
NEUTROPHILS # BLD AUTO: 14.42 X10 (3) UL (ref 1.5–7.7)
NEUTROPHILS # BLD AUTO: 14.42 X10(3) UL (ref 1.5–7.7)
NEUTROPHILS # BLD AUTO: 18.09 X10 (3) UL (ref 1.5–7.7)
NEUTROPHILS # BLD AUTO: 4.06 X10 (3) UL (ref 1.5–7.7)
NEUTROPHILS # BLD AUTO: 4.06 X10(3) UL (ref 1.5–7.7)
NEUTROPHILS # BLD AUTO: 4.39 X10 (3) UL (ref 1.5–7.7)
NEUTROPHILS # BLD AUTO: 4.39 X10(3) UL (ref 1.5–7.7)
NEUTROPHILS # BLD AUTO: 5.27 X10 (3) UL (ref 1.5–7.7)
NEUTROPHILS # BLD AUTO: 5.27 X10(3) UL (ref 1.5–7.7)
NEUTROPHILS # BLD AUTO: 5.97 X10 (3) UL (ref 1.5–7.7)
NEUTROPHILS # BLD AUTO: 5.97 X10(3) UL (ref 1.5–7.7)
NEUTROPHILS # BLD AUTO: 6.51 X10 (3) UL (ref 1.5–7.7)
NEUTROPHILS # BLD AUTO: 6.51 X10(3) UL (ref 1.5–7.7)
NEUTROPHILS # BLD AUTO: 6.59 X10 (3) UL (ref 1.5–7.7)
NEUTROPHILS # BLD AUTO: 6.59 X10(3) UL (ref 1.5–7.7)
NEUTROPHILS # BLD AUTO: 7.27 X10 (3) UL (ref 1.5–7.7)
NEUTROPHILS # BLD AUTO: 7.27 X10(3) UL (ref 1.5–7.7)
NEUTROPHILS # BLD AUTO: 8.73 X10 (3) UL (ref 1.5–7.7)
NEUTROPHILS # BLD AUTO: 8.73 X10(3) UL (ref 1.5–7.7)
NEUTROPHILS # BLD AUTO: 8.77 X10 (3) UL (ref 1.5–7.7)
NEUTROPHILS # BLD AUTO: 8.77 X10(3) UL (ref 1.5–7.7)
NEUTROPHILS # BLD AUTO: 9.44 X10 (3) UL (ref 1.5–7.7)
NEUTROPHILS # BLD AUTO: 9.44 X10(3) UL (ref 1.5–7.7)
NEUTROPHILS NFR BLD AUTO: 62.8 %
NEUTROPHILS NFR BLD AUTO: 65.5 %
NEUTROPHILS NFR BLD AUTO: 66.1 %
NEUTROPHILS NFR BLD AUTO: 72.5 %
NEUTROPHILS NFR BLD AUTO: 78 %
NEUTROPHILS NFR BLD AUTO: 78.1 %
NEUTROPHILS NFR BLD AUTO: 79.5 %
NEUTROPHILS NFR BLD AUTO: 81.9 %
NEUTROPHILS NFR BLD AUTO: 84.2 %
NEUTROPHILS NFR BLD AUTO: 85.2 %
NEUTROPHILS NFR BLD AUTO: 85.9 %
NEUTROPHILS NFR BLD AUTO: 86.9 %
NEUTROPHILS NFR BLD AUTO: 87.4 %
NEUTROPHILS NFR BLD AUTO: 88.5 %
NEUTROPHILS NFR BLD AUTO: 89 %
NEUTROPHILS NFR BLD AUTO: 89.3 %
NEUTROPHILS NFR BLD: 72 %
NEUTROPHILS NFR BLD: 80 %
NEUTS BAND NFR BLD: 2 %
NEUTS BAND NFR BLD: 7 %
NEUTS HYPERSEG # BLD: 12.06 X10(3) UL (ref 1.5–7.7)
NEUTS HYPERSEG # BLD: 18.27 X10(3) UL (ref 1.5–7.7)
NITRITE UR QL STRIP.AUTO: NEGATIVE
NT-PROBNP SERPL-MCNC: 1133 PG/ML (ref ?–450)
NT-PROBNP SERPL-MCNC: 638 PG/ML (ref ?–450)
NT-PROBNP SERPL-MCNC: 639 PG/ML (ref ?–450)
NT-PROBNP SERPL-MCNC: 719 PG/ML (ref ?–450)
NT-PROBNP SERPL-MCNC: 769 PG/ML (ref ?–450)
NT-PROBNP SERPL-MCNC: 891 PG/ML (ref ?–450)
OSMOLALITY SERPL CALC.SUM OF ELEC: 273 MOSM/KG (ref 275–295)
OSMOLALITY SERPL CALC.SUM OF ELEC: 279 MOSM/KG (ref 275–295)
OSMOLALITY SERPL CALC.SUM OF ELEC: 279 MOSM/KG (ref 275–295)
OSMOLALITY SERPL CALC.SUM OF ELEC: 280 MOSM/KG (ref 275–295)
OSMOLALITY SERPL CALC.SUM OF ELEC: 283 MOSM/KG (ref 275–295)
OSMOLALITY SERPL CALC.SUM OF ELEC: 283 MOSM/KG (ref 275–295)
OSMOLALITY SERPL CALC.SUM OF ELEC: 284 MOSM/KG (ref 275–295)
OSMOLALITY SERPL CALC.SUM OF ELEC: 285 MOSM/KG (ref 275–295)
OSMOLALITY SERPL CALC.SUM OF ELEC: 286 MOSM/KG (ref 275–295)
OSMOLALITY SERPL CALC.SUM OF ELEC: 288 MOSM/KG (ref 275–295)
OSMOLALITY SERPL CALC.SUM OF ELEC: 289 MOSM/KG (ref 275–295)
OSMOLALITY SERPL CALC.SUM OF ELEC: 290 MOSM/KG (ref 275–295)
OSMOLALITY SERPL CALC.SUM OF ELEC: 291 MOSM/KG (ref 275–295)
OSMOLALITY SERPL CALC.SUM OF ELEC: 292 MOSM/KG (ref 275–295)
OSMOLALITY SERPL CALC.SUM OF ELEC: 292 MOSM/KG (ref 275–295)
OSMOLALITY SERPL CALC.SUM OF ELEC: 293 MOSM/KG (ref 275–295)
OSMOLALITY SERPL CALC.SUM OF ELEC: 294 MOSM/KG (ref 275–295)
OSMOLALITY SERPL CALC.SUM OF ELEC: 296 MOSM/KG (ref 275–295)
OSMOLALITY SERPL CALC.SUM OF ELEC: 296 MOSM/KG (ref 275–295)
OSMOLALITY SERPL CALC.SUM OF ELEC: 299 MOSM/KG (ref 275–295)
PARAINFLUENZA 1 PCR:: NEGATIVE
PARAINFLUENZA 2 PCR:: NEGATIVE
PARAINFLUENZA 3 PCR:: NEGATIVE
PARAINFLUENZA 4 PCR:: NEGATIVE
PH UR: 6 [PH] (ref 5–8)
PLATELET # BLD AUTO: 199 10(3)UL (ref 150–450)
PLATELET # BLD AUTO: 204 10(3)UL (ref 150–450)
PLATELET # BLD AUTO: 213 10(3)UL (ref 150–450)
PLATELET # BLD AUTO: 227 10(3)UL (ref 150–450)
PLATELET # BLD AUTO: 245 10(3)UL (ref 150–450)
PLATELET # BLD AUTO: 252 10(3)UL (ref 150–450)
PLATELET # BLD AUTO: 281 10(3)UL (ref 150–450)
PLATELET # BLD AUTO: 289 10(3)UL (ref 150–450)
PLATELET # BLD AUTO: 295 10(3)UL (ref 150–450)
PLATELET # BLD AUTO: 307 10(3)UL (ref 150–450)
PLATELET # BLD AUTO: 343 10(3)UL (ref 150–450)
PLATELET # BLD AUTO: 346 10(3)UL (ref 150–450)
PLATELET # BLD AUTO: 353 10(3)UL (ref 150–450)
PLATELET # BLD AUTO: 381 10(3)UL (ref 150–450)
PLATELET # BLD AUTO: 400 10(3)UL (ref 150–450)
PLATELET # BLD AUTO: 400 10(3)UL (ref 150–450)
PLATELET # BLD AUTO: 402 10(3)UL (ref 150–450)
PLATELET # BLD AUTO: 404 10(3)UL (ref 150–450)
PLATELET # BLD AUTO: 410 10(3)UL (ref 150–450)
PLATELET # BLD AUTO: 414 10(3)UL (ref 150–450)
PLATELET # BLD AUTO: 416 10(3)UL (ref 150–450)
PLATELET # BLD AUTO: 422 10(3)UL (ref 150–450)
PLATELET # BLD AUTO: 427 10(3)UL (ref 150–450)
PLATELET # BLD AUTO: 446 10(3)UL (ref 150–450)
PLATELET # BLD AUTO: 453 10(3)UL (ref 150–450)
PLATELET # BLD AUTO: 467 10(3)UL (ref 150–450)
PLATELET # BLD AUTO: 470 10(3)UL (ref 150–450)
PLATELET # BLD AUTO: 499 10(3)UL (ref 150–450)
PLATELET # BLD AUTO: 516 10(3)UL (ref 150–450)
PLATELET # BLD AUTO: 518 10(3)UL (ref 150–450)
PLATELET MORPHOLOGY: NORMAL
PLATELET MORPHOLOGY: NORMAL
POTASSIUM SERPL-SCNC: 3.5 MMOL/L (ref 3.5–5.1)
POTASSIUM SERPL-SCNC: 4 MMOL/L (ref 3.5–5.1)
POTASSIUM SERPL-SCNC: 4 MMOL/L (ref 3.5–5.1)
POTASSIUM SERPL-SCNC: 4.1 MMOL/L (ref 3.5–5.1)
POTASSIUM SERPL-SCNC: 4.2 MMOL/L (ref 3.5–5.1)
POTASSIUM SERPL-SCNC: 4.3 MMOL/L (ref 3.5–5.1)
POTASSIUM SERPL-SCNC: 4.3 MMOL/L (ref 3.5–5.1)
POTASSIUM SERPL-SCNC: 4.4 MMOL/L (ref 3.5–5.1)
POTASSIUM SERPL-SCNC: 4.5 MMOL/L (ref 3.5–5.1)
POTASSIUM SERPL-SCNC: 4.6 MMOL/L (ref 3.5–5.1)
POTASSIUM SERPL-SCNC: 4.7 MMOL/L (ref 3.5–5.1)
POTASSIUM SERPL-SCNC: 4.7 MMOL/L (ref 3.5–5.1)
POTASSIUM SERPL-SCNC: 4.8 MMOL/L (ref 3.5–5.1)
POTASSIUM SERPL-SCNC: 4.9 MMOL/L (ref 3.5–5.1)
POTASSIUM SERPL-SCNC: 5 MMOL/L (ref 3.5–5.1)
POTASSIUM SERPL-SCNC: 5.1 MMOL/L (ref 3.5–5.1)
PROCALCITONIN SERPL-MCNC: 0.06 NG/ML (ref ?–0.16)
PROCALCITONIN SERPL-MCNC: 0.06 NG/ML (ref ?–0.16)
PROCALCITONIN SERPL-MCNC: 0.09 NG/ML (ref ?–0.16)
PROCALCITONIN SERPL-MCNC: 0.21 NG/ML (ref ?–0.16)
PROCALCITONIN SERPL-MCNC: 0.25 NG/ML (ref ?–0.16)
PROCALCITONIN SERPL-MCNC: 0.3 NG/ML (ref ?–0.16)
PROCALCITONIN SERPL-MCNC: <0.02 NG/ML (ref ?–0.16)
PROT UR-MCNC: 100 MG/DL
RBC # BLD AUTO: 4.18 X10(6)UL
RBC # BLD AUTO: 4.21 X10(6)UL
RBC # BLD AUTO: 4.21 X10(6)UL
RBC # BLD AUTO: 4.26 X10(6)UL
RBC # BLD AUTO: 4.33 X10(6)UL
RBC # BLD AUTO: 4.38 X10(6)UL
RBC # BLD AUTO: 4.38 X10(6)UL
RBC # BLD AUTO: 4.42 X10(6)UL
RBC # BLD AUTO: 4.44 X10(6)UL
RBC # BLD AUTO: 4.46 X10(6)UL
RBC # BLD AUTO: 4.47 X10(6)UL
RBC # BLD AUTO: 4.48 X10(6)UL
RBC # BLD AUTO: 4.5 X10(6)UL
RBC # BLD AUTO: 4.53 X10(6)UL
RBC # BLD AUTO: 4.55 X10(6)UL
RBC # BLD AUTO: 4.56 X10(6)UL
RBC # BLD AUTO: 4.56 X10(6)UL
RBC # BLD AUTO: 4.57 X10(6)UL
RBC # BLD AUTO: 4.58 X10(6)UL
RBC # BLD AUTO: 4.58 X10(6)UL
RBC # BLD AUTO: 4.62 X10(6)UL
RBC # BLD AUTO: 4.65 X10(6)UL
RBC # BLD AUTO: 4.7 X10(6)UL
RBC # BLD AUTO: 4.73 X10(6)UL
RBC # BLD AUTO: 4.79 X10(6)UL
RBC # BLD AUTO: 4.79 X10(6)UL
RBC # BLD AUTO: 4.93 X10(6)UL
RBC #/AREA URNS AUTO: 2 /HPF
RHINOVIRUS/ENTERO PCR:: NEGATIVE
RSV RNA SPEC QL NAA+PROBE: NEGATIVE
SARS-COV-2 IGG+IGM SERPL QL IA: REACTIVE
SARS-COV-2 RNA RESP QL NAA+PROBE: DETECTED
SODIUM SERPL-SCNC: 131 MMOL/L (ref 136–145)
SODIUM SERPL-SCNC: 134 MMOL/L (ref 136–145)
SODIUM SERPL-SCNC: 136 MMOL/L (ref 136–145)
SODIUM SERPL-SCNC: 137 MMOL/L (ref 136–145)
SODIUM SERPL-SCNC: 138 MMOL/L (ref 136–145)
SODIUM SERPL-SCNC: 139 MMOL/L (ref 136–145)
SODIUM SERPL-SCNC: 140 MMOL/L (ref 136–145)
SODIUM SERPL-SCNC: 140 MMOL/L (ref 136–145)
SODIUM SERPL-SCNC: 141 MMOL/L (ref 136–145)
SP GR UR STRIP: 1.02 (ref 1–1.03)
TOTAL CELLS COUNTED: 100
TOTAL CELLS COUNTED: 100
TROPONIN I SERPL-MCNC: <0.045 NG/ML (ref ?–0.04)
TROPONIN I SERPL-MCNC: <0.045 NG/ML (ref ?–0.04)
UROBILINOGEN UR STRIP-ACNC: 2
WBC # BLD AUTO: 10.2 X10(3) UL (ref 4–11)
WBC # BLD AUTO: 10.4 X10(3) UL (ref 4–11)
WBC # BLD AUTO: 10.7 X10(3) UL (ref 4–11)
WBC # BLD AUTO: 11 X10(3) UL (ref 4–11)
WBC # BLD AUTO: 11.1 X10(3) UL (ref 4–11)
WBC # BLD AUTO: 12.2 X10(3) UL (ref 4–11)
WBC # BLD AUTO: 12.7 X10(3) UL (ref 4–11)
WBC # BLD AUTO: 13.1 X10(3) UL (ref 4–11)
WBC # BLD AUTO: 14 X10(3) UL (ref 4–11)
WBC # BLD AUTO: 14 X10(3) UL (ref 4–11)
WBC # BLD AUTO: 14.1 X10(3) UL (ref 4–11)
WBC # BLD AUTO: 14.7 X10(3) UL (ref 4–11)
WBC # BLD AUTO: 15.1 X10(3) UL (ref 4–11)
WBC # BLD AUTO: 15.9 X10(3) UL (ref 4–11)
WBC # BLD AUTO: 16.2 X10(3) UL (ref 4–11)
WBC # BLD AUTO: 16.3 X10(3) UL (ref 4–11)
WBC # BLD AUTO: 17.8 X10(3) UL (ref 4–11)
WBC # BLD AUTO: 21 X10(3) UL (ref 4–11)
WBC # BLD AUTO: 21.5 X10(3) UL (ref 4–11)
WBC # BLD AUTO: 4.6 X10(3) UL (ref 4–11)
WBC # BLD AUTO: 6.5 X10(3) UL (ref 4–11)
WBC # BLD AUTO: 6.7 X10(3) UL (ref 4–11)
WBC # BLD AUTO: 6.8 X10(3) UL (ref 4–11)
WBC # BLD AUTO: 7.5 X10(3) UL (ref 4–11)
WBC # BLD AUTO: 8 X10(3) UL (ref 4–11)
WBC # BLD AUTO: 8.2 X10(3) UL (ref 4–11)
WBC # BLD AUTO: 8.3 X10(3) UL (ref 4–11)
WBC # BLD AUTO: 8.9 X10(3) UL (ref 4–11)
WBC # BLD AUTO: 9.2 X10(3) UL (ref 4–11)
WBC # BLD AUTO: 9.3 X10(3) UL (ref 4–11)
WBC #/AREA URNS AUTO: 20 /HPF

## 2021-01-01 PROCEDURE — 71045 X-RAY EXAM CHEST 1 VIEW: CPT | Performed by: EMERGENCY MEDICINE

## 2021-01-01 PROCEDURE — 99233 SBSQ HOSP IP/OBS HIGH 50: CPT | Performed by: HOSPITALIST

## 2021-01-01 PROCEDURE — 99442 PHONE E/M BY PHYS 11-20 MIN: CPT | Performed by: INTERNAL MEDICINE

## 2021-01-01 PROCEDURE — 99233 SBSQ HOSP IP/OBS HIGH 50: CPT | Performed by: INTERNAL MEDICINE

## 2021-01-01 PROCEDURE — 99223 1ST HOSP IP/OBS HIGH 75: CPT | Performed by: INTERNAL MEDICINE

## 2021-01-01 PROCEDURE — 99291 CRITICAL CARE FIRST HOUR: CPT | Performed by: INTERNAL MEDICINE

## 2021-01-01 PROCEDURE — 71045 X-RAY EXAM CHEST 1 VIEW: CPT | Performed by: HOSPITALIST

## 2021-01-01 PROCEDURE — 71045 X-RAY EXAM CHEST 1 VIEW: CPT | Performed by: INTERNAL MEDICINE

## 2021-01-01 PROCEDURE — 99232 SBSQ HOSP IP/OBS MODERATE 35: CPT | Performed by: PHYSICIAN ASSISTANT

## 2021-01-01 PROCEDURE — 99232 SBSQ HOSP IP/OBS MODERATE 35: CPT | Performed by: INTERNAL MEDICINE

## 2021-01-01 PROCEDURE — 86769 SARS-COV-2 COVID-19 ANTIBODY: CPT | Performed by: INTERNAL MEDICINE

## 2021-01-01 PROCEDURE — 99283 EMERGENCY DEPT VISIT LOW MDM: CPT

## 2021-01-01 PROCEDURE — 99231 SBSQ HOSP IP/OBS SF/LOW 25: CPT | Performed by: INTERNAL MEDICINE

## 2021-01-01 PROCEDURE — 93306 TTE W/DOPPLER COMPLETE: CPT | Performed by: INTERNAL MEDICINE

## 2021-01-01 PROCEDURE — XW033E5 INTRODUCTION OF REMDESIVIR ANTI-INFECTIVE INTO PERIPHERAL VEIN, PERCUTANEOUS APPROACH, NEW TECHNOLOGY GROUP 5: ICD-10-PCS | Performed by: INTERNAL MEDICINE

## 2021-01-01 PROCEDURE — 1111F DSCHRG MED/CURRENT MED MERGE: CPT | Performed by: INTERNAL MEDICINE

## 2021-01-01 PROCEDURE — 32555 ASPIRATE PLEURA W/ IMAGING: CPT | Performed by: INTERNAL MEDICINE

## 2021-01-01 PROCEDURE — 99223 1ST HOSP IP/OBS HIGH 75: CPT | Performed by: HOSPITALIST

## 2021-01-01 PROCEDURE — 99232 SBSQ HOSP IP/OBS MODERATE 35: CPT | Performed by: HOSPITALIST

## 2021-01-01 PROCEDURE — 0W9B3ZZ DRAINAGE OF LEFT PLEURAL CAVITY, PERCUTANEOUS APPROACH: ICD-10-PCS | Performed by: INTERNAL MEDICINE

## 2021-01-01 PROCEDURE — 32554 ASPIRATE PLEURA W/O IMAGING: CPT | Performed by: INTERNAL MEDICINE

## 2021-01-01 PROCEDURE — 93306 TTE W/DOPPLER COMPLETE: CPT | Performed by: HOSPITALIST

## 2021-01-01 PROCEDURE — 74230 X-RAY XM SWLNG FUNCJ C+: CPT | Performed by: HOSPITALIST

## 2021-01-01 PROCEDURE — 99443 PHONE E/M BY PHYS 21-30 MIN: CPT | Performed by: INTERNAL MEDICINE

## 2021-01-01 PROCEDURE — 93970 EXTREMITY STUDY: CPT | Performed by: HOSPITALIST

## 2021-01-01 PROCEDURE — 99219 INITIAL OBSERVATION CARE,LEVL II: CPT | Performed by: HOSPITALIST

## 2021-01-01 PROCEDURE — 70450 CT HEAD/BRAIN W/O DYE: CPT | Performed by: EMERGENCY MEDICINE

## 2021-01-01 PROCEDURE — 99239 HOSP IP/OBS DSCHRG MGMT >30: CPT | Performed by: HOSPITALIST

## 2021-01-01 PROCEDURE — 71046 X-RAY EXAM CHEST 2 VIEWS: CPT | Performed by: HOSPITALIST

## 2021-01-01 PROCEDURE — G2252 BRIEF CHKIN BY MD/QHP, 11-20: HCPCS | Performed by: INTERNAL MEDICINE

## 2021-01-01 PROCEDURE — 5A09557 ASSISTANCE WITH RESPIRATORY VENTILATION, GREATER THAN 96 CONSECUTIVE HOURS, CONTINUOUS POSITIVE AIRWAY PRESSURE: ICD-10-PCS | Performed by: HOSPITALIST

## 2021-01-01 PROCEDURE — 99221 1ST HOSP IP/OBS SF/LOW 40: CPT | Performed by: INTERNAL MEDICINE

## 2021-01-01 PROCEDURE — XW033H5 INTRODUCTION OF TOCILIZUMAB INTO PERIPHERAL VEIN, PERCUTANEOUS APPROACH, NEW TECHNOLOGY GROUP 5: ICD-10-PCS | Performed by: HOSPITALIST

## 2021-01-01 PROCEDURE — 99226 SUBSEQUENT OBSERVATION CARE: CPT | Performed by: HOSPITALIST

## 2021-01-01 PROCEDURE — 5A09457 ASSISTANCE WITH RESPIRATORY VENTILATION, 24-96 CONSECUTIVE HOURS, CONTINUOUS POSITIVE AIRWAY PRESSURE: ICD-10-PCS | Performed by: EMERGENCY MEDICINE

## 2021-01-01 PROCEDURE — 71260 CT THORAX DX C+: CPT | Performed by: INTERNAL MEDICINE

## 2021-01-01 PROCEDURE — 3E0333Z INTRODUCTION OF ANTI-INFLAMMATORY INTO PERIPHERAL VEIN, PERCUTANEOUS APPROACH: ICD-10-PCS | Performed by: EMERGENCY MEDICINE

## 2021-01-01 PROCEDURE — 5A0955A ASSISTANCE WITH RESPIRATORY VENTILATION, GREATER THAN 96 CONSECUTIVE HOURS, HIGH NASAL FLOW/VELOCITY: ICD-10-PCS | Performed by: INTERNAL MEDICINE

## 2021-01-01 RX ORDER — ONDANSETRON 2 MG/ML
4 INJECTION INTRAMUSCULAR; INTRAVENOUS EVERY 4 HOURS PRN
Status: ACTIVE | OUTPATIENT
Start: 2021-01-01 | End: 2021-01-01

## 2021-01-01 RX ORDER — HYDRALAZINE HYDROCHLORIDE 20 MG/ML
10 INJECTION INTRAMUSCULAR; INTRAVENOUS EVERY 4 HOURS PRN
Status: DISCONTINUED | OUTPATIENT
Start: 2021-01-01 | End: 2021-01-01

## 2021-01-01 RX ORDER — IPRATROPIUM BROMIDE AND ALBUTEROL SULFATE 2.5; .5 MG/3ML; MG/3ML
3 SOLUTION RESPIRATORY (INHALATION) 2 TIMES DAILY
Qty: 90 VIAL | Refills: 1 | COMMUNITY
Start: 2021-01-01 | End: 2021-01-01

## 2021-01-01 RX ORDER — HEPARIN SODIUM 5000 [USP'U]/ML
5000 INJECTION, SOLUTION INTRAVENOUS; SUBCUTANEOUS EVERY 12 HOURS SCHEDULED
Status: DISCONTINUED | OUTPATIENT
Start: 2021-01-01 | End: 2021-01-01

## 2021-01-01 RX ORDER — AMOXICILLIN AND CLAVULANATE POTASSIUM 875; 125 MG/1; MG/1
1 TABLET, FILM COATED ORAL 2 TIMES DAILY
Qty: 14 TABLET | Refills: 0 | Status: SHIPPED | OUTPATIENT
Start: 2021-01-01 | End: 2021-01-01

## 2021-01-01 RX ORDER — FUROSEMIDE 10 MG/ML
20 INJECTION INTRAMUSCULAR; INTRAVENOUS ONCE
Status: COMPLETED | OUTPATIENT
Start: 2021-01-01 | End: 2021-01-01

## 2021-01-01 RX ORDER — ACETAMINOPHEN 325 MG/1
650 TABLET ORAL EVERY 6 HOURS PRN
Status: DISCONTINUED | OUTPATIENT
Start: 2021-01-01 | End: 2021-01-01

## 2021-01-01 RX ORDER — VANCOMYCIN HYDROCHLORIDE
1500 EVERY 24 HOURS
Status: DISCONTINUED | OUTPATIENT
Start: 2021-01-01 | End: 2021-01-01

## 2021-01-01 RX ORDER — DEXTROSE MONOHYDRATE 25 G/50ML
50 INJECTION, SOLUTION INTRAVENOUS
Status: DISCONTINUED | OUTPATIENT
Start: 2021-01-01 | End: 2021-01-01

## 2021-01-01 RX ORDER — GUAIFENESIN 600 MG
600 TABLET, EXTENDED RELEASE 12 HR ORAL 2 TIMES DAILY
Status: DISCONTINUED | OUTPATIENT
Start: 2021-01-01 | End: 2021-01-01

## 2021-01-01 RX ORDER — POTASSIUM CHLORIDE 20 MEQ/1
40 TABLET, EXTENDED RELEASE ORAL EVERY 4 HOURS
Status: COMPLETED | OUTPATIENT
Start: 2021-01-01 | End: 2021-01-01

## 2021-01-01 RX ORDER — ALBUTEROL SULFATE 90 UG/1
2 AEROSOL, METERED RESPIRATORY (INHALATION) 4 TIMES DAILY
Qty: 1 INHALER | Refills: 0 | Status: SHIPPED | OUTPATIENT
Start: 2021-01-01 | End: 2021-01-01

## 2021-01-01 RX ORDER — ALBUTEROL SULFATE 90 UG/1
1 AEROSOL, METERED RESPIRATORY (INHALATION) 4 TIMES DAILY
Status: DISCONTINUED | OUTPATIENT
Start: 2021-01-01 | End: 2021-01-01

## 2021-01-01 RX ORDER — PREDNISONE 20 MG/1
60 TABLET ORAL
Qty: 90 TABLET | Refills: 0 | Status: SHIPPED | OUTPATIENT
Start: 2021-01-01 | End: 2021-01-01

## 2021-01-01 RX ORDER — GALANTAMINE HYDROBROMIDE 4 MG/1
4 TABLET, FILM COATED ORAL 2 TIMES DAILY WITH MEALS
Refills: 0 | Status: SHIPPED | COMMUNITY
Start: 2021-01-01 | End: 2021-01-01

## 2021-01-01 RX ORDER — ASPIRIN 81 MG/1
81 TABLET ORAL DAILY
Status: DISCONTINUED | OUTPATIENT
Start: 2021-01-01 | End: 2021-01-01

## 2021-01-01 RX ORDER — IPRATROPIUM BROMIDE AND ALBUTEROL SULFATE 2.5; .5 MG/3ML; MG/3ML
3 SOLUTION RESPIRATORY (INHALATION)
Status: DISCONTINUED | OUTPATIENT
Start: 2021-01-01 | End: 2021-01-01

## 2021-01-01 RX ORDER — IPRATROPIUM BROMIDE AND ALBUTEROL SULFATE 2.5; .5 MG/3ML; MG/3ML
3 SOLUTION RESPIRATORY (INHALATION) EVERY 4 HOURS PRN
Status: DISCONTINUED | OUTPATIENT
Start: 2021-01-01 | End: 2021-01-01

## 2021-01-01 RX ORDER — IPRATROPIUM BROMIDE AND ALBUTEROL SULFATE 2.5; .5 MG/3ML; MG/3ML
3 SOLUTION RESPIRATORY (INHALATION) ONCE
Status: COMPLETED | OUTPATIENT
Start: 2021-01-01 | End: 2021-01-01

## 2021-01-01 RX ORDER — CHOLECALCIFEROL (VITAMIN D3) 125 MCG
1000 CAPSULE ORAL DAILY
Status: DISCONTINUED | OUTPATIENT
Start: 2021-01-01 | End: 2021-01-01

## 2021-01-01 RX ORDER — ENOXAPARIN SODIUM 100 MG/ML
40 INJECTION SUBCUTANEOUS DAILY
Status: DISCONTINUED | OUTPATIENT
Start: 2021-01-01 | End: 2021-01-01

## 2021-01-01 RX ORDER — GARLIC EXTRACT 500 MG
1 CAPSULE ORAL 2 TIMES DAILY
Status: DISCONTINUED | OUTPATIENT
Start: 2021-01-01 | End: 2021-01-01

## 2021-01-01 RX ORDER — DEXAMETHASONE SODIUM PHOSPHATE 4 MG/ML
6 VIAL (ML) INJECTION ONCE
Status: COMPLETED | OUTPATIENT
Start: 2021-01-01 | End: 2021-01-01

## 2021-01-01 RX ORDER — GALANTAMINE HYDROBROMIDE 4 MG/1
4 TABLET, FILM COATED ORAL 2 TIMES DAILY WITH MEALS
Qty: 180 TABLET | Refills: 0 | Status: SHIPPED | OUTPATIENT
Start: 2021-01-01 | End: 2021-01-01

## 2021-01-01 RX ORDER — METOCLOPRAMIDE HYDROCHLORIDE 5 MG/ML
5 INJECTION INTRAMUSCULAR; INTRAVENOUS EVERY 8 HOURS PRN
Status: DISCONTINUED | OUTPATIENT
Start: 2021-01-01 | End: 2021-01-01

## 2021-01-01 RX ORDER — NITROGLYCERIN 0.4 MG/1
0.4 TABLET SUBLINGUAL EVERY 5 MIN PRN
Status: DISCONTINUED | OUTPATIENT
Start: 2021-01-01 | End: 2021-01-01

## 2021-01-01 RX ORDER — HEPARIN SODIUM AND DEXTROSE 10000; 5 [USP'U]/100ML; G/100ML
INJECTION INTRAVENOUS CONTINUOUS
Status: DISCONTINUED | OUTPATIENT
Start: 2021-01-01 | End: 2021-01-01

## 2021-01-01 RX ORDER — ONDANSETRON 2 MG/ML
4 INJECTION INTRAMUSCULAR; INTRAVENOUS EVERY 6 HOURS PRN
Status: DISCONTINUED | OUTPATIENT
Start: 2021-01-01 | End: 2021-01-01

## 2021-01-01 RX ORDER — CAPSAICIN 0.025 %
CREAM (GRAM) TOPICAL 3 TIMES DAILY
Status: DISCONTINUED | OUTPATIENT
Start: 2021-01-01 | End: 2021-01-01

## 2021-01-01 RX ORDER — VANCOMYCIN/0.9 % SOD CHLORIDE 1.75 G/5
25 PLASTIC BAG, INJECTION (ML) INTRAVENOUS ONCE
Status: COMPLETED | OUTPATIENT
Start: 2021-01-01 | End: 2021-01-01

## 2021-01-01 RX ORDER — IPRATROPIUM BROMIDE AND ALBUTEROL SULFATE 2.5; .5 MG/3ML; MG/3ML
3 SOLUTION RESPIRATORY (INHALATION) 3 TIMES DAILY
Qty: 90 VIAL | Refills: 1 | Status: SHIPPED | OUTPATIENT
Start: 2021-01-01 | End: 2021-01-01

## 2021-01-01 RX ORDER — ALBUTEROL SULFATE 2.5 MG/3ML
2.5 SOLUTION RESPIRATORY (INHALATION)
Status: DISCONTINUED | OUTPATIENT
Start: 2021-01-01 | End: 2021-01-01

## 2021-01-01 RX ORDER — METOCLOPRAMIDE HYDROCHLORIDE 5 MG/ML
10 INJECTION INTRAMUSCULAR; INTRAVENOUS EVERY 8 HOURS PRN
Status: DISCONTINUED | OUTPATIENT
Start: 2021-01-01 | End: 2021-01-01

## 2021-01-01 RX ORDER — METHYLPREDNISOLONE SODIUM SUCCINATE 40 MG/ML
40 INJECTION, POWDER, LYOPHILIZED, FOR SOLUTION INTRAMUSCULAR; INTRAVENOUS EVERY 8 HOURS
Status: DISPENSED | OUTPATIENT
Start: 2021-01-01 | End: 2021-01-01

## 2021-01-01 RX ORDER — VANCOMYCIN HYDROCHLORIDE 125 MG/1
125 CAPSULE ORAL DAILY
Status: DISCONTINUED | OUTPATIENT
Start: 2021-01-01 | End: 2021-01-01

## 2021-01-01 RX ORDER — CHOLECALCIFEROL (VITAMIN D3) 50 MCG
1 TABLET ORAL DAILY
Qty: 30 TABLET | Refills: 0 | Status: ON HOLD | COMMUNITY
Start: 2021-01-01 | End: 2021-01-01

## 2021-01-01 RX ORDER — HEPARIN SODIUM 1000 [USP'U]/ML
60 INJECTION, SOLUTION INTRAVENOUS; SUBCUTANEOUS ONCE
Status: COMPLETED | OUTPATIENT
Start: 2021-01-01 | End: 2021-01-01

## 2021-01-01 RX ORDER — ALBUTEROL SULFATE 90 UG/1
2 AEROSOL, METERED RESPIRATORY (INHALATION) 4 TIMES DAILY
Status: DISCONTINUED | OUTPATIENT
Start: 2021-01-01 | End: 2021-01-01

## 2021-01-01 RX ORDER — HEPARIN SODIUM AND DEXTROSE 10000; 5 [USP'U]/100ML; G/100ML
12 INJECTION INTRAVENOUS ONCE
Status: DISCONTINUED | OUTPATIENT
Start: 2021-01-01 | End: 2021-01-01

## 2021-01-01 RX ORDER — ZINC GLUCONATE 2 [HP_X]/1
LOZENGE ORAL 2 TIMES DAILY
Qty: 84 LOZENGE | Refills: 0 | COMMUNITY
Start: 2021-01-01 | End: 2021-01-01

## 2021-01-01 RX ORDER — DOCUSATE SODIUM 100 MG/1
100 CAPSULE, LIQUID FILLED ORAL 2 TIMES DAILY
Status: DISCONTINUED | OUTPATIENT
Start: 2021-01-01 | End: 2021-01-01

## 2021-01-01 RX ORDER — ASPIRIN 81 MG/1
324 TABLET, CHEWABLE ORAL ONCE
Status: COMPLETED | OUTPATIENT
Start: 2021-01-01 | End: 2021-01-01

## 2021-01-01 RX ORDER — POLYETHYLENE GLYCOL 3350 17 G/17G
17 POWDER, FOR SOLUTION ORAL DAILY PRN
Status: DISCONTINUED | OUTPATIENT
Start: 2021-01-01 | End: 2021-01-01

## 2021-01-01 RX ORDER — PREDNISONE 20 MG/1
60 TABLET ORAL
Status: DISCONTINUED | OUTPATIENT
Start: 2021-01-01 | End: 2021-01-01

## 2021-01-01 RX ORDER — HALOPERIDOL 5 MG/ML
1 INJECTION INTRAMUSCULAR EVERY 6 HOURS PRN
Status: DISCONTINUED | OUTPATIENT
Start: 2021-01-01 | End: 2021-01-01

## 2021-01-01 RX ORDER — GALANTAMINE HYDROBROMIDE 12 MG/1
12 TABLET, FILM COATED ORAL 2 TIMES DAILY WITH MEALS
Refills: 3 | Status: DISCONTINUED | OUTPATIENT
Start: 2021-01-01 | End: 2021-01-01

## 2021-01-01 RX ORDER — FUROSEMIDE 10 MG/ML
40 INJECTION INTRAMUSCULAR; INTRAVENOUS ONCE
Status: COMPLETED | OUTPATIENT
Start: 2021-01-01 | End: 2021-01-01

## 2021-01-01 RX ORDER — GARLIC EXTRACT 500 MG
1 CAPSULE ORAL DAILY
Status: DISCONTINUED | OUTPATIENT
Start: 2021-01-01 | End: 2021-01-01

## 2021-01-01 RX ORDER — DEXAMETHASONE SODIUM PHOSPHATE 4 MG/ML
6 VIAL (ML) INJECTION EVERY 24 HOURS
Status: DISCONTINUED | OUTPATIENT
Start: 2021-01-01 | End: 2021-01-01 | Stop reason: ALTCHOICE

## 2021-01-01 RX ORDER — ZINC SULFATE 50(220)MG
220 CAPSULE ORAL DAILY
Status: DISCONTINUED | OUTPATIENT
Start: 2021-01-01 | End: 2021-01-01

## 2021-01-01 RX ORDER — FAMOTIDINE 20 MG/1
20 TABLET ORAL DAILY
Status: DISCONTINUED | OUTPATIENT
Start: 2021-01-01 | End: 2021-01-01

## 2021-01-01 RX ORDER — GALANTAMINE HYDROBROMIDE 16 MG/1
16 CAPSULE, EXTENDED RELEASE ORAL
Qty: 90 CAPSULE | Refills: 3 | Status: SHIPPED | OUTPATIENT
Start: 2021-01-01

## 2021-01-01 RX ORDER — CHOLINE BITARTRATE 300 MG
TABLET, EXTENDED RELEASE ORAL
Refills: 0 | Status: ON HOLD | COMMUNITY
Start: 2021-01-01 | End: 2021-01-01

## 2021-01-01 RX ORDER — BISACODYL 10 MG
10 SUPPOSITORY, RECTAL RECTAL
Status: DISCONTINUED | OUTPATIENT
Start: 2021-01-01 | End: 2021-01-01

## 2021-01-01 RX ORDER — DEXAMETHASONE 6 MG/1
6 TABLET ORAL DAILY
Status: COMPLETED | OUTPATIENT
Start: 2021-01-01 | End: 2021-01-01

## 2021-01-01 RX ORDER — DEXAMETHASONE SODIUM PHOSPHATE 4 MG/ML
6 VIAL (ML) INJECTION EVERY 24 HOURS
Status: DISCONTINUED | OUTPATIENT
Start: 2021-01-01 | End: 2021-01-01

## 2021-01-01 RX ORDER — ASPIRIN 81 MG/1
81 TABLET, CHEWABLE ORAL DAILY
Status: DISCONTINUED | OUTPATIENT
Start: 2021-01-01 | End: 2021-01-01

## 2021-01-01 RX ORDER — IPRATROPIUM BROMIDE AND ALBUTEROL SULFATE 2.5; .5 MG/3ML; MG/3ML
3 SOLUTION RESPIRATORY (INHALATION) EVERY 6 HOURS PRN
Status: DISCONTINUED | OUTPATIENT
Start: 2021-01-01 | End: 2021-01-01

## 2021-01-01 RX ORDER — SODIUM CHLORIDE 9 MG/ML
INJECTION, SOLUTION INTRAVENOUS CONTINUOUS
Status: DISCONTINUED | OUTPATIENT
Start: 2021-01-01 | End: 2021-01-01

## 2021-01-01 RX ORDER — MAGNESIUM SULFATE HEPTAHYDRATE 40 MG/ML
2 INJECTION, SOLUTION INTRAVENOUS ONCE
Status: COMPLETED | OUTPATIENT
Start: 2021-01-01 | End: 2021-01-01

## 2021-01-01 RX ORDER — LORAZEPAM 2 MG/ML
1 INJECTION INTRAMUSCULAR ONCE
Status: COMPLETED | OUTPATIENT
Start: 2021-01-01 | End: 2021-01-01

## 2021-01-01 RX ORDER — GALANTAMINE HYDROBROMIDE 16 MG/1
16 CAPSULE, EXTENDED RELEASE ORAL
Qty: 30 CAPSULE | Refills: 3 | Status: SHIPPED | OUTPATIENT
Start: 2021-01-01 | End: 2021-01-01

## 2021-01-01 RX ORDER — MULTIVIT,TX WITH IRON,MINERALS
TABLET, EXTENDED RELEASE ORAL
Qty: 30 TABLET | Refills: 0 | Status: ON HOLD | OUTPATIENT
Start: 2021-01-01 | End: 2021-01-01

## 2021-01-01 RX ORDER — GALANTAMINE HYDROBROMIDE 4 MG/1
4 TABLET, FILM COATED ORAL 2 TIMES DAILY WITH MEALS
Status: DISCONTINUED | OUTPATIENT
Start: 2021-01-01 | End: 2021-01-01

## 2021-01-01 RX ORDER — GALANTAMINE HYDROBROMIDE 8 MG/1
8 TABLET, FILM COATED ORAL 2 TIMES DAILY WITH MEALS
Refills: 3 | Status: DISCONTINUED | OUTPATIENT
Start: 2021-01-01 | End: 2021-01-01

## 2021-01-01 RX ORDER — GUAIFENESIN 100 MG/5ML
200 SOLUTION ORAL EVERY 4 HOURS PRN
Status: DISCONTINUED | OUTPATIENT
Start: 2021-01-01 | End: 2021-01-01

## 2021-01-01 RX ORDER — PREDNISONE 20 MG/1
10 TABLET ORAL DAILY
Qty: 90 TABLET | Refills: 0 | COMMUNITY
Start: 2021-01-01 | End: 2021-01-01

## 2021-01-01 RX ORDER — ASCORBIC ACID 500 MG
500 TABLET ORAL 2 TIMES DAILY
Status: DISCONTINUED | OUTPATIENT
Start: 2021-01-01 | End: 2021-01-01

## 2021-01-01 RX ORDER — BUPROPION HYDROCHLORIDE 75 MG/1
75 TABLET ORAL 2 TIMES DAILY
Qty: 30 TABLET | Refills: 0 | Status: ON HOLD | OUTPATIENT
Start: 2021-01-01 | End: 2021-01-01

## 2021-01-01 RX ORDER — MELATONIN
3 NIGHTLY PRN
Status: DISCONTINUED | OUTPATIENT
Start: 2021-01-01 | End: 2021-01-01

## 2021-01-01 RX ORDER — HEPARIN SODIUM 10000 [USP'U]/100ML
12 INJECTION, SOLUTION INTRAVENOUS CONTINUOUS
Status: DISCONTINUED | OUTPATIENT
Start: 2021-01-01 | End: 2021-01-01

## 2021-01-01 RX ORDER — CODEINE PHOSPHATE AND GUAIFENESIN 10; 100 MG/5ML; MG/5ML
5 SOLUTION ORAL EVERY 4 HOURS PRN
Status: DISCONTINUED | OUTPATIENT
Start: 2021-01-01 | End: 2021-01-01

## 2021-01-01 RX ORDER — FAMOTIDINE 20 MG/1
20 TABLET ORAL 2 TIMES DAILY
Status: DISCONTINUED | OUTPATIENT
Start: 2021-01-01 | End: 2021-01-01

## 2021-01-01 RX ORDER — SODIUM PHOSPHATE, DIBASIC AND SODIUM PHOSPHATE, MONOBASIC 7; 19 G/133ML; G/133ML
1 ENEMA RECTAL ONCE AS NEEDED
Status: DISCONTINUED | OUTPATIENT
Start: 2021-01-01 | End: 2021-01-01

## 2021-01-12 NOTE — TELEPHONE ENCOUNTER
[place] returned from [place] 12/20. Mild cough started yesterday no fever, no other sx. Restart KYLE plus Imodium 1mg bid, zinc, C, D, probiotic. CDC-choline.  Will test.     [place] daughter has mild cough and sinus, loss smell 3-4 days better, no

## 2021-01-13 PROBLEM — U07.1 COVID-19 VIRUS INFECTION: Status: ACTIVE | Noted: 2021-01-01

## 2021-01-14 NOTE — TELEPHONE ENCOUNTER
Covid+ with 2 days of mild cough. High risk age, Dementia. Doing well no other sx. On supplements=juicePlus, C,D, adding probiotic and Choline. O2 sats 93 per daughter Sirena Stearns who also has +case (not my patient). Will quarantine at home. Checkin again 1/15.

## 2021-01-17 PROBLEM — G40.909 RECURRENT SEIZURES (HCC): Status: ACTIVE | Noted: 2021-01-01

## 2021-01-17 PROBLEM — R09.02 HYPOXIA: Status: ACTIVE | Noted: 2021-01-01

## 2021-01-17 NOTE — ED INITIAL ASSESSMENT (HPI)
Pt arrive in ER per [facility] with c/o seizure episode x1 + SOB, patient was COVUD + on 1/12/21, as per ems patients O2 sats at 80 % on RA at home

## 2021-01-17 NOTE — PLAN OF CARE
Covid +: 21  Symptom Onset: 01/10/21  Tmax: Afebrile  O2: 4L @95%, wean as tolerated    Monitor Labs,    LDH: 266  Ferritin: 590  CRP: 12.6  DDimer: 0.86  BNP: 719    Antibiotics: on hold  Blood Thinner: lovenox 40mg daily  Decadron: start

## 2021-01-17 NOTE — ED PROVIDER NOTES
Patient Seen in: [facility] AND [facility] Emergency Department      History   Patient presents with:  Seizure Disorder  Difficulty Breathing    Stated Complaint: seizure+sob    HPI/Subjective:   HPI    15-year-old female at home not on oxygen with history of co Right humeral fracture 08/2015    OR   • Rotator cuff tear, right 1/2010    OR   • Temporal lobe seizure ([facility] [address] 8/2013    resolved   • Vitamin B 12 deficiency    • Vitamin D deficiency               Past Surgical History:   Procedure Laterality Date   • ANG distress. Abdominal: Soft. There is no tenderness. There is no guarding. Musculoskeletal: Normal range of motion. No edema or tenderness. Neurological: Oriented to person, moves all 4 extremities and follows commands. Skin: Skin is warm and dry.    P ---------                               -----------         ------                     CBC W/ DIFFERENTIAL[[id number]]          Abnormal            Final result                 Please view results for these tests on the individual orders.    C mass, acute infarction, or significant atrophy. BRAINSTEM: No edema, hemorrhage, mass, acute infarction, or significant atrophy. CALVARIUM: There is no apparent depressed fracture, mass, or other significant visible lesion.   SINUSES: Limited views demons to 83% with a good waveform reading on room air. She was put on 4 L of oxygen saturating now 91 to 93%. She is not in distress. She will be given IV Decadron and have the inflammatory Covid panel work-up performed.   She will be admitted to the [facility]

## 2021-01-17 NOTE — ED NOTES
Orders for admission, patient is aware of plan and ready to go upstairs. Any questions, please call ED RN Naima Arthur at extension [phone].    Type of COVID test sent:is known positive  COVID Suspicion level: High    Titratable drug(s) infusing:  Rate:    LOC at t

## 2021-01-17 NOTE — CONSULTS
[facility] INFECTIOUS DISEASE TELEMEDICINE CONSULT NOTE     CC: COVID-19     HPI: 80year old female with history of hypertension m, pre-DM, dementia, vitamin D deficiency, seizure disorder, and dementia who was brought to the ED with recurrent seizure.  She was

## 2021-01-17 NOTE — H&P
[address] [facility] [place] Patient Status:  Inpatient    1930 MRN [id number]   Location [facility] 5SW/SE Attending Hao Sarabia MD   Hosp Day # 0 PCP Conny Seip, MD     Date:  2021  Date o 5/11/15   • Hyperlipidemia    • Influenza vaccine refused 10/10/2013   • Leg fracture 1980's    resolved   • Macular degeneration    • OA (osteoarthritis) of knee 3/27/12    bilat, severe   • Onychomycosis 6/28/11   • OP (osteoporosis)    • Pelvic fracture Hr   No No   Sig: TAKE 1 CAPSULE BY MOUTH EVERY DAY WITH BREAKFAST   Nutritional Supplements (JUICE PLUS FIBRE) Oral Liquid   Yes No   Sig: Take by mouth daily. Probiotic Product (PROBIOTIC ADVANCED) Oral Cap   Yes No   Sig: Take by mouth.    Turmeric 500 no murmur, no edema. Gastrointestinal:  Soft, non-tender, non-distended, normal bowel sounds, no organomegaly. Lymphatics:  No lymphadenopathy neck, axilla, groin. Musculoskeletal: Normal range of motion. normal strength. Feet:  Normal pulses.   Neurol Primary care physician  Leandra Zepeda MD    Disposition  Clinical course will dictate outcome    70 minutes spent on this admission - examining patient, obtaining history, reviewing previous medical records, going over test results/imaging and discussing

## 2021-01-17 NOTE — PLAN OF CARE
Problem: PAIN - ADULT  Goal: Verbalizes/displays adequate comfort level or patient's stated pain goal  Description: INTERVENTIONS:  - Encourage pt to monitor pain and request assistance  - Assess pain using appropriate pain scale  - Administer analgesics b appropriate  - Identify discharge learning needs (meds, wound care, etc)  - Arrange for interpreters to assist at discharge as needed  - Consider post-discharge preferences of patient/family/discharge partner  - Complete POLST form as appropriate  - Assess

## 2021-01-18 NOTE — CM/SW NOTE
SW received MDO for discharge planning. Patient was discussed during rounds and PT/OT recommendation is MARY JO. MIGUEL contacted patient's daughter, Pastor Miguel P# [phone], to discuss discharge planning. Pastor Miguel reports that patient lives with family.  Olman Bowden

## 2021-01-18 NOTE — PROGRESS NOTES
[facility]    Progress Note    Sagar Farmer Patient Status:  Inpatient    1930 MRN [id number]   Location [facility] 5SW/SE Attending Daphnie Givens MD   Hosp Day # 1 PCP Kristina Pepe MD       Subjective:   Rhiannon Kumar ascorbic acid  500 mg Oral BID   • famotidine  20 mg Oral Daily   • aspirin  81 mg Oral Daily   • Vitamin B-12  1,000 mcg Oral Daily   • Galantamine Hydrobromide  12 mg Oral BID with meals       Current PRN Inpatient Meds:      sodium chloride 0.9%, acetam pneumonia.     Dictated by (CST): Natalio Nichole MD on 1/17/2021 at 2:29 PM     Finalized by (CST): Natalio Nichole MD on 1/17/2021 at 2:31 PM          Ekg 12-lead    Result Date: 1/17/2021  ECG Report  Interpretation  -------------------------- Sinus

## 2021-01-18 NOTE — PROGRESS NOTES
Pulmonary/Critical Care Follow Up Note    HPI:   Arnel Ayoub is a 80year old female with Patient presents with:  Seizure Disorder  Difficulty Breathing      PCP Vineet Barron MD  Admission Attending Kinga Bhandari MD    Hospital Day #1    No complain (TYLENOL) tab 650 mg, 650 mg, Oral, Q6H PRN  •  melatonin tab TABS 3 mg, 3 mg, Oral, Nightly PRN  •  docusate sodium (COLACE) cap 100 mg, 100 mg, Oral, BID  •  PEG 3350 (MIRALAX) powder packet 17 g, 17 g, Oral, Daily PRN  •  magnesium hydroxide (MILK OF MA 01/18/2021    ALB 2.7 01/18/2021    ALKPHO 81 01/18/2021    BILT 0.4 01/18/2021    TP 6.4 01/18/2021    AST 28 01/18/2021    ALT 18 01/18/2021    DDIMER 0.48 01/18/2021    CRP 15.00 01/18/2021           ASSESSMENT/PLAN:     Syncope  Billed as Sz initially

## 2021-01-18 NOTE — PROGRESS NOTES
Sx onset- 1/10/21  Covid positive- 1/12/21  Admitted- 1/17/21    About day 10 of sx     CXR- 1/17/21- B GGO  Echo- 1/18/21- dictation pending  CT brain-1/17/21- Negative     DVT prophylaxis- Lovenox 40mg daily     02- 5L, increased from 3L     Current- 97.

## 2021-01-18 NOTE — OCCUPATIONAL THERAPY NOTE
OCCUPATIONAL THERAPY EVALUATION - INPATIENT     Room Number: 544/544-A  Evaluation Date: 1/18/2021  Type of Evaluation: Initial       Physician Order: IP Consult to Occupational Therapy  Reason for Therapy: ADL/IADL Dysfunction and Discharge Planning    OC deficits (de-saturation and SOB with activity). Pt completed toileting with Mod A (assist for thoroughness) + Min A for standing balance. Pt doffed her depends with Min A , Mod A to don depends --increased assist due to shortness of breath with activity. Frequent falls    • History of acute respiratory failure 8/13/2017 7-29-17   • History of syncope 5/11/15   • Hyperlipidemia    • Influenza vaccine refused 10/10/2013   • Leg fracture 1980's    resolved   • Macular degeneration    • OA (osteoarthritis) DAILY LIVING ASSESSMENT  AM-PAC ‘6-Clicks’ Inpatient Daily Activity Short Form  How much help from another person does the patient currently need…  -   Putting on and taking off regular lower body clothing?: A Little  -   Bathing (including washing, rinsin

## 2021-01-18 NOTE — CONSULTS
Pulmonary/Critical Care Consultation Note    HPI:   Rachael Wilson is a 80year old female with Patient presents with:  Seizure Disorder  Difficulty Breathing    Augustine Rodriguez MD    Pt is a 81 yo with dementia, CAD, falls, HL, Sz d/o and other med problem 9/8/2018    Performed by Jerad Olmstead MD at [facility] MAIN OR   • HIP SURGERY  8/2015    right   • HIP SURGERY Left 09/2018    [facility]   • HUMERUS FRACTURE SURGERY  8/2015    right   • IR VERTEBROPLASTY  11/10/2017    T12 by    • LUMPECTOMY LEFT  2002 (REGLAN) injection 5 mg, 5 mg, Intravenous, Q8H PRN    •  famoTIDine (PEPCID) tab 20 mg, 20 mg, Oral, Daily    •  influenza vaccine (PF) (FLUZONE HD) high dose for 65 yrs & older inj 0.7ml, 0.7 mL, Intramuscular, Prior to discharge    •  aspirin EC tab 81 rash  Psych Normal mood      LABS  Lab Results   Component Value Date    WBC 10.2 01/17/2021    HGB 14.9 01/17/2021    HCT 43.8 01/17/2021    .0 01/17/2021    CREATSERUM 0.82 01/17/2021    BUN 8 01/17/2021     01/17/2021    K 4.2 01/17/2021

## 2021-01-18 NOTE — PHYSICAL THERAPY NOTE
PHYSICAL THERAPY EVALUATION - INPATIENT     Room Number: 544/544-A  Evaluation Date: 1/18/2021  Type of Evaluation: Initial   Physician Order: PT Eval and Treat    Presenting Problem: +COVID (1/12/21)  Reason for Therapy: Mobility Dysfunction and Disc room- needs in reach- alarm activated. The patient's Approx Degree of Impairment: 50.57% has been calculated based on documentation in the [facility] '6 clicks' Inpatient Basic Mobility Short Form.   Research supports that patients with this level of impairm neuralgia     left scapula   • Prediabetes    • Right humeral fracture 08/2015    OR   • Rotator cuff tear, right 1/2010    OR   • Temporal lobe seizure ([facility] [address] 8/2013    resolved   • Vitamin B 12 deficiency    • Vitamin D deficiency        Past Surgical His functional limits     Lower extremity ROM is within functional limits     Lower extremity strength is impaired bilateral LE's: 3+/5    BALANCE  Static Sitting: Fair +  Dynamic Sitting: Fair  Static Standing: Fair -  Dynamic Standing: Poor +    ACTIVITY BRITTNEY self-stated goal is: return to PLOF   Goal #1 Patient is able to demonstrate supine - sit EOB @ level: supervision     Goal #1   Current Status    Goal #2 Patient is able to demonstrate transfers Sit to/from Stand at assistance level: supervision with walk

## 2021-01-18 NOTE — PLAN OF CARE
Problem: Patient Centered Care  Goal: Patient preferences are identified and integrated in the patient's plan of care  Description: Interventions:  - What would you like us to know as we care for you?   - Provide timely, complete, and accurate informatio guidelines  Outcome: Progressing     Problem: SAFETY ADULT - FALL  Goal: Free from fall injury  Description: INTERVENTIONS:  - Assess pt frequently for physical needs  - Identify cognitive and physical deficits and behaviors that affect risk of falls.   - I patient/family  Outcome: Progressing  Goal: Maintain proper alignment of affected body part  Description: INTERVENTIONS:  - Support and protect limb and body alignment per provider's orders  - Instruct and reinforce with patient and family use of appropria

## 2021-01-18 NOTE — PROGRESS NOTES
[facility] Note: Route Optimization for Dexamethasone (Decadron)    Patient is currently on Dexamethasone (Decadron) 6 mg IV every 24 hours.    The patient meets the criteria to convert to the oral equivalent as established by the IV to Oral conversion pr

## 2021-01-18 NOTE — PROGRESS NOTES
INFECTIOUS DISEASE PROGRESS NOTE  [facility] TELEMEDICINE PROGRESS NOTE    Suri Huff Patient Status:  Inpatient    1930 MRN [id number]   Location [facility] 5SW/SE Attending Kevin Butler MD   [facility] an outpatient on 1/11/2021. Other household family members also diagnosed with COVID-19 infection.       In the ED, she was noted to have hypoxia down to 77% on room air. She is currently on supplemental oxygen at 3L NC.     1. COVID-19 infection  2.  Acute

## 2021-01-18 NOTE — PLAN OF CARE
Alert to self, confused. currently on 4l oxygen through [place]. saturating 88s to 94s. pt keeps taking off oxygen. frequent reorientation given,pt verbalized understanding. IV fluid infusing . discussed with  regarding anticonvulsant clarification,per pt P exercise  -antibiotic as per orders  -weaning from the oxygen  -follow up with MD orders  - See additional Care Plan goals for specific interventions  Outcome: Progressing     Problem: PAIN - ADULT  Goal: Verbalizes/displays adequate comfort level or patie barriers to discharge w/pt and caregiver  - Include patient/family/discharge partner in discharge planning  - Arrange for needed discharge resources and transportation as appropriate  - Identify discharge learning needs (meds, wound care, etc)  - Arrange f or any respiratory difficulty  - Respiratory Therapy support as indicated  - Manage/alleviate anxiety  - Monitor for signs/symptoms of CO2 retention  Outcome: Progressing     Problem: MUSCULOSKELETAL - ADULT  Goal: Return mobility to safest level of functi Instruct patient/family to call for assistance with activity based on assessment  Outcome: Progressing     Problem: Impaired Functional Mobility  Goal: Achieve highest/safest level of mobility/gait  Description: Interventions:  - Assess patient's functiona

## 2021-01-19 NOTE — PROGRESS NOTES
[facility]    Progress Note    Sofía Mars Patient Status:  Inpatient    1930 MRN [id number]   Location [facility] 5SW/SE Attending Herbie Kim MD   Hosp Day # 2 PCP Ludwin Tolentino MD       Subjective:   Diana Yuen • enoxaparin  40 mg Subcutaneous Daily   • docusate sodium  100 mg Oral BID   • zinc sulfate  220 mg Oral Daily   • ascorbic acid  500 mg Oral BID   • famotidine  20 mg Oral Daily   • aspirin  81 mg Oral Daily   • Vitamin B-12  1,000 mcg Oral Daily   • G progression of acute viral pneumonia due to COVID-19. 2. Stable cardiomegaly.     Dictated by (CST): Xavier Peterson MD on 1/19/2021 at 10:02 AM     Finalized by (CST): Xavier Peterson MD on 1/19/2021 at 10:05 AM              Assessment and Plan: She is fairly adamant that she wants to take her mother home today. I kindly mentioned to her that her CXR does look worse today though you cannot treat a patient solely on their XRay results you have to look at her clinically.  She is still on 5L o2 which with Deirdre Guerrero after he speaks to her.

## 2021-01-19 NOTE — PLAN OF CARE
Covid +: 21  Symptom Onset: 01/10/21  Tmax: Afebrile  O2: 5L @94%, wean as tolerated    Monitor Labs,    LDH: 266 -> 232 -> 259  Ferritin: 590 -> 686 -> 806  CRP: 12.6 -> 15.00 -> 8.15  DDimer: 0.86 -> 0.48 -> 0.45  BNP: 719    Antibiotics

## 2021-01-19 NOTE — PROGRESS NOTES
[facility]    Progress Note    Sachinkatie Soler Patient Status:  Inpatient    1930 MRN [id number]   Location [facility] 5SW/SE Attending Kailey Spaulding MD   Hosp Day # 2 PCP Abdelrahman Nowak MD        Subjective:    The patijuan 01/19/2021    .0 01/19/2021    CREATSERUM 0.60 01/19/2021    BUN 20 (H) 01/19/2021     01/19/2021    K 4.5 01/19/2021     01/19/2021    CO2 24.0 01/19/2021     (H) 01/19/2021    CA 9.1 01/19/2021    ALB 2.5 (L) 01/19/2021    ALKPH

## 2021-01-19 NOTE — PLAN OF CARE
Problem: Patient Centered Care  Goal: Patient preferences are identified and integrated in the patient's plan of care  Description: Interventions:  - What would you like us to know as we care for you?  I have a big family that helps take care of me  - Pro Monitor WBC  - Administer growth factors as ordered  - Implement neutropenic guidelines  Outcome: Progressing     Problem: SAFETY ADULT - FALL  Goal: Free from fall injury  Description: INTERVENTIONS:  - Assess pt frequently for physical needs  - Identify

## 2021-01-19 NOTE — PLAN OF CARE
Problem: Patient Centered Care  Goal: Patient preferences are identified and integrated in the patient's plan of care  Description: Interventions:  - What would you like us to know as we care for you?  I have a big family that helps take care of me  - Pro including physical limitations  - Instruct pt to call for assistance with activity based on assessment  - Modify environment to reduce risk of injury  - Provide assistive devices as appropriate  - Consider OT/PT consult to assist with strengthening/mobilit monitor vital signs, obtain 12 lead EKG if indicated  - Evaluate effectiveness of antiarrhythmic and heart rate control medications as ordered  - Initiate emergency measures for life threatening arrhythmias  - Monitor electrolytes and administer replacemen

## 2021-01-19 NOTE — HOME CARE LIAISON
Received referral from Aisha James. Spoke to pt's dtr/ Clayton Campuzano and she is agreeable to  services for discharge. All questions and concerns addressed. MIGUEL Marin updated.

## 2021-01-20 PROBLEM — Z71.89 COUNSELING REGARDING ADVANCE CARE PLANNING AND GOALS OF CARE: Status: ACTIVE | Noted: 2021-01-01

## 2021-01-20 PROBLEM — Z51.5 PALLIATIVE CARE BY SPECIALIST: Status: ACTIVE | Noted: 2021-01-01

## 2021-01-20 NOTE — SLP NOTE
SLP orders received and acknowledged. Chart reviewed. Pt remains on 25L/min HFO2 @ 100% FIO2. Pt not appropriate for swallow evaluation at this time. SLP to f/u as respiratory status improves for oral intake. Thank you.     Radha Isidro

## 2021-01-20 NOTE — CONSULTS
[address]  [id number]  Hospital Day #3  Date of Consult: 01/20/21  Patient seen at: [address]    Reason for Consultation:      Consult requested by Dr Bailee Barron for ev (Alzheimer's disease) ([facility] [address]    • Benign essential HTN    • Breast CA ([facility] [address]     left lumpectomy and 5 yrs tamoxifen   • Burn of left shoulder     heating pad   • CAD (coronary artery disease) 1995    s/p stent for 90% blockage   • Closed left hip fracture ( explore    Mormonism/Cultural Information:  Mormonism Affiliation: Judaism    Functional Status: was independent PTA    Substance History     Smoking status former  History of Substance abuse none    Allergies: No Known Allergies    Medications:       Cur to discharge  •  aspirin EC tab 81 mg, 81 mg, Oral, Daily  •  Vitamin B-12 (VITAMIN B12) tab 1,000 mcg, 1,000 mcg, Oral, Daily  •  galantamine (RAZADYNE) tab, 12 mg, Oral, BID with meals  No current outpatient medications on file.       Labs/ imaging     He pneumonia due to COVID-19. 2. Stable cardiomegaly.     Dictated by (CST): Karson Kahn MD on 1/19/2021 at 10:02 AM     Finalized by (CST): Karson Kahn MD on 1/19/2021 at 10:05 AM          Xr Chest Ap Portable  (cpt=71045)    Result Date: 1/17/2 Care Minimal Drowsy/confused   10 Bedbnd/coma Extensive Disease  Can’t do any work  coma  Max Assist  Total Care Mouth care Drowsy or coma   0 Death     Palliative Care Assessment     Goals of Care: I introduced palliative care and reason for consultation. setting of advanced age and co-morbidities.    FULL CODE STATUS    HCPOA:        Healthcare Agent Appointed: Yes  Healthcare Agent's Name: Carey Dad her daughter  Healthcare Agent's Phone Number: [phone]     Spiritual needs addressed: Referral placed for S

## 2021-01-20 NOTE — PROGRESS NOTES
Pulmonary/Critical Care Follow Up Note    HPI:   Rahat Song is a 80year old female with Patient presents with:  Seizure Disorder  Difficulty Breathing      PCP Kristin Adkins MD  Admission Attending Antonio Sorenson MD    Hospital Day #3    No complain 6 mg, Oral, Daily  •  remdesivir 100 mg in sodium chloride 0.9% 270 mL IVPB, 100 mg, Intravenous, Q24H  •  sodium chloride 0.9% IV bolus 500 mL, 500 mL, Intravenous, PRN  •  Enoxaparin Sodium (LOVENOX) 40 MG/0.4ML injection 40 mg, 40 mg, Subcutaneous, Juan Value Date    WBC 11.0 01/20/2021    HGB 13.3 01/20/2021    HCT 40.0 01/20/2021    .0 01/20/2021    CREATSERUM 0.53 01/20/2021    BUN 18 01/20/2021     01/20/2021    K 4.0 01/20/2021     01/20/2021    CO2 27.0 01/20/2021    GLU 80 01/20/

## 2021-01-20 NOTE — RESPIRATORY THERAPY NOTE
Patient found on R/A,O2 sat 70%,placed patient back  on vapotherm  30 L and FIO2 100%,told patient to keep O2 on all the times. [address]. [address]: fio2 decreased to 80%,flow 25 L -O2 sat 93%.

## 2021-01-20 NOTE — PLAN OF CARE
Erick Richmond, the patient's mother called to confirm her son's surgery on 1/30/2018. Erick Richmond also inquired as to what physician would be performing the surgery. Please call. Okay to leave a detailed message because the mother does not have time to talk. Problem: Patient Centered Care  Goal: Patient preferences are identified and integrated in the patient's plan of care  Description: Interventions:  - What would you like us to know as we care for you?  I have a big family that helps take care of me  - Pro including physical limitations  - Instruct pt to call for assistance with activity based on assessment  - Modify environment to reduce risk of injury  - Provide assistive devices as appropriate  - Consider OT/PT consult to assist with strengthening/mobilit monitor vital signs, obtain 12 lead EKG if indicated  - Evaluate effectiveness of antiarrhythmic and heart rate control medications as ordered  - Initiate emergency measures for life threatening arrhythmias  - Monitor electrolytes and administer replacemen

## 2021-01-20 NOTE — PROGRESS NOTES
[facility]    Progress Note    Sachinkatie Soler Patient Status:  Inpatient    1930 MRN [id number]   Location [facility] 5SW/SE Attending Kailey Spaulding MD   Hosp Day # 3 PCP Abdelrahman Nowak MD       Subjective:   Jessica Expose Q24H   • Acidophilus/Pectin  1 capsule Oral BID   • Albuterol Sulfate HFA  2 puff Inhalation QID   • dexamethasone  6 mg Oral Daily   • remdesivir  100 mg Intravenous Q24H   • enoxaparin  40 mg Subcutaneous Daily   • docusate sodium  100 mg Oral BID   • zi Result No Growth 2 Days N/A       Imaging/EKG:   Xr Chest Ap Portable  (cpt=71045)    Result Date: 1/20/2021  CONCLUSION:  1. Acute viral pneumonia due to COVID-19 with slight interval worsening. 2. Stable cardiomegaly.     Dictated by (CST): Ni Bañuelos her know that Araceli Marin desaturated overnight and is now requiring 30L vapotherm to maintain saturations. I let her know taht we are giving actemra now - agreeable to this. We will cont abx. She is a full code for now per dtr.  I was able to get approval for d

## 2021-01-20 NOTE — PROGRESS NOTES
INFECTIOUS DISEASE PROGRESS NOTE  [facility] ID TELEMEDICINE PROGRESS NOTE    Poppy Garcia Patient Status:  Inpatient    1930 MRN [id number]   Location [facility] 5SW/SE Attending Cleve Horta MD   [address] [facility] Palliative care by specialist      ASSESSMENT:    Antibiotics: Ceftriaxone    80year old female with history of hypertension m, pre-DM, dementia, vitamin D deficiency, seizure disorder, and dementia who was brought to the ED with recurrent seizure.  She

## 2021-01-20 NOTE — SIGNIFICANT EVENT
Called by RN to evaluate patient in room 544    Patient denying significant shortness of breath, currently on Vapotherm after desatting into the 70s on 5 L nasal cannula.   Initially responded to nonrebreather however continued to have increased work of Misty Infante

## 2021-01-20 NOTE — PLAN OF CARE
Problem: Patient Centered Care  Goal: Patient preferences are identified and integrated in the patient's plan of care  Description: Interventions:  - What would you like us to know as we care for you?  I have a big family that helps take care of me  - Pro Monitor WBC  - Administer growth factors as ordered  - Implement neutropenic guidelines  Outcome: Progressing     Problem: SAFETY ADULT - FALL  Goal: Free from fall injury  Description: INTERVENTIONS:  - Assess pt frequently for physical needs  - Identify hemodynamic stability  - Monitor arterial and/or venous puncture sites for bleeding and/or hematoma  - Assess quality of pulses, skin color and temperature  - Assess for signs of decreased coronary artery perfusion - ex.  Angina  - Evaluate fluid balance, a ordered activity level and limitations with patient/family  Outcome: Progressing  Goal: Maintain proper alignment of affected body part  Description: INTERVENTIONS:  - Support and protect limb and body alignment per provider's orders  - Instruct and reinfo activity level and precautions  - Recommend use of  RW for transfers and ambulation  Outcome: Progressing     Problem: Impaired Activities of Daily Living  Goal: Achieve highest/safest level of independence in self care  Description: Interventions:  - Asse

## 2021-01-20 NOTE — PROGRESS NOTES
Update     I called dtr Elizabeth Barnhart back at [address] - agreeable for having mother stay tonight get 3rd dose of RDV. I orffered to have patient set up with IPAD so she may talk to Yoanna. Elizabeth Barnhart very appreciate of this.    I explained to her that her PCP Dr. Chet Kendall and

## 2021-01-20 NOTE — PLAN OF CARE
Covid +: 21  Symptom Onset: 01/10/21  Tmax: Afebrile  O2: 30L Vapotherm @ 93%, wean as tolerated    Monitor Labs,    LDH: 266 -> 232 -> 259 -> 247  Ferritin: 590 -> 686 -> 806 -> 731  CRP: 12.6 -> 15.00 -> 8.15 -> 5.19  DDimer: 0.86 -> 0.4

## 2021-01-21 NOTE — PLAN OF CARE
Problem: Patient Centered Care  Goal: Patient preferences are identified and integrated in the patient's plan of care  Description: Interventions:  - What would you like us to know as we care for you?  I have a big family that helps take care of me  - Pro period  Description: INTERVENTIONS  - Monitor WBC  - Administer growth factors as ordered  - Implement neutropenic guidelines  Outcome: Progressing     Problem: SAFETY ADULT - FALL  Goal: Free from fall injury  Description: INTERVENTIONS:  - Assess pt freq of vasoactive medications to optimize hemodynamic stability  - Monitor arterial and/or venous puncture sites for bleeding and/or hematoma  - Assess quality of pulses, skin color and temperature  - Assess for signs of decreased coronary artery perfusion - e ambulatory ordered parameters to optimize cerebral perfusion and minimize risk of hemorrhage  - Monitor temperature, glucose, and sodium.  Initiate appropriate interventions as ordered  Outcome: Progressing     Problem: Impaired Functional Mobility  Goal: Achieve high

## 2021-01-21 NOTE — SLP NOTE
ADULT SWALLOWING EVALUATION    ASSESSMENT    ASSESSMENT/OVERALL IMPRESSION:    PT IS COVID 19 +. PPE REQUIRED. THIS SLP WORE GLOVES, GOWN, FACE SHIELD AND DROPLET MASK OVER N95 MASK. HANDS SANITIZED/WASHED UPON ENTRANCE/EXIT.     This BSE was ordered d/t \" results/recommendations discussed with Pt and dtr; good understanding by dtr/reinforcement needed for Pt. Swallowing precautions written on white board in Pt room. PLAN:    To f/u x3 sessions/meals for dysphagia treatment.  Will ensure safe rogers • Onychomycosis 6/28/11   • OP (osteoporosis)    • Pelvic fracture (HCC)    • Pneumonia due to COVID-19 virus    • Post herpetic neuralgia     left scapula   • Prediabetes    • Right humeral fracture 08/2015    OR   • Rotator cuff tear, right 1/2010    O tolerate trial upgrade of chopped consistency and THIN liquids (when respiratory status improves) without overt signs or symptoms of aspiration with 100 % accuracy over 1-2 session(s).   In Progress     FOLLOW UP  Treatment Plan/Recommendations: Aspiration

## 2021-01-21 NOTE — PROGRESS NOTES
[facility]  Palliative Care Follow Up    Soren Burr  [id number]  Hospital Day #4  Date of Consult: 01/20/21  Patient seen at: [address]    Subjective: This patient is either COVID-19+ or is PUI for COVID-19.  I acetaminophen (TYLENOL) tab 650 mg, 650 mg, Oral, Q6H PRN  •  melatonin tab TABS 3 mg, 3 mg, Oral, Nightly PRN  •  docusate sodium (COLACE) cap 100 mg, 100 mg, Oral, BID  •  PEG 3350 (MIRALAX) powder packet 17 g, 17 g, Oral, Daily PRN  •  magnesium hydroxi Albumin (g/dL)   Date Value   01/21/2021 2.6 (L)   01/20/2021 2.5 (L)   01/19/2021 2.5 (L)   01/18/2021 2.7 (L)   01/17/2021 3.1 (L)   11/10/2017 3.2 (L)       Imaging:  Xr Chest Ap Portable  (cpt=71045)    Result Date: 1/21/2021  CONCLUSION:   Alexandrabl Extensive Disease  Can’t do any work  coma  Max Assist  Total Care Mouth care Drowsy or coma   0 Death     Palliative Care Assessment     Goals of Care Discussions: continue with current medical treatments     Advance Care Planning counseling and discussio

## 2021-01-21 NOTE — RESPIRATORY THERAPY NOTE
07: 21AM - Patient received on Vapotherm 25L, FiO2 85%. Patient tolerating and saturating appropriately anywhere from 89%-92%. RT will continue to monitor. 10:36AM - Received call from RN and changed water bag to a new one.  Patient on settings 25L, FiO2

## 2021-01-21 NOTE — PHYSICAL THERAPY NOTE
PHYSICAL THERAPY TREATMENT NOTE - INPATIENT     Room Number: 049/562-E       Presenting Problem: +COVID (1/12/21)    Problem List  Principal Problem:    COVID-19 virus infection  Active Problems:    Hypoxia    [facility]    Counseling regardi Sitting: Fair +  Dynamic Sitting: Fair           Static Standing: Fair -  Dynamic Standing: Poor +      O2 WALK           Ambulation oxygen flow (liters per minute): 25(85% vapotherm)      AM-PAC '6-Clicks' INPATIENT SHORT FORM - BASIC MOBILITY  How much d #5 Patient to demonstrate independence with home activity/exercise instructions provided to patient in preparation for discharge. (natalia verbal cues)   Goal #5   Current Status

## 2021-01-21 NOTE — PROGRESS NOTES
Pulmonary/Critical Care Follow Up Note    HPI:   Poppy Garcia is a 80year old female with Patient presents with:  Seizure Disorder  Difficulty Breathing      PCP Isabelle Ansari MD  Admission Attending Asad Chaparro MD    Hospital Day #4    C/o mild so mg, Oral, Daily  •  remdesivir 100 mg in sodium chloride 0.9% 270 mL IVPB, 100 mg, Intravenous, Q24H  •  sodium chloride 0.9% IV bolus 500 mL, 500 mL, Intravenous, PRN  •  Enoxaparin Sodium (LOVENOX) 40 MG/0.4ML injection 40 mg, 40 mg, Subcutaneous, Daily Value Date    WBC 8.2 01/21/2021    HGB 13.7 01/21/2021    HCT 41.1 01/21/2021    .0 01/21/2021    CREATSERUM 0.58 01/21/2021    BUN 22 01/21/2021     01/21/2021    K 4.2 01/21/2021     01/21/2021    CO2 28.0 01/21/2021    GLU 97 01/21/2

## 2021-01-21 NOTE — PLAN OF CARE
Covid +: 21  Symptom Onset: 01/10/21  Tmax: Afebrile  O2: 25L Vapotherm @ 93%, wean as tolerated    Monitor Labs,    LDH: 266 -> 232 -> 259 -> 247 -> 300  Ferritin: 590 -> 686 -> 806 -> 731 -> 564  CRP: 12.6 -> 15.00 -> 8.15 -> 5.19 -> 5.3

## 2021-01-22 NOTE — PROGRESS NOTES
[facility]    Progress Note    Vashti Atkins Patient Status:  Inpatient    1930 MRN [id number]   Location [facility] 5SW/SE Attending Natanael Castillo MD   [facility] Day # 4 PCP Eri Garcia MD       Subjective:   Nickie Montiel on 1/21/2021 at 8:20 AM     Finalized by (CST): Dolly Smith MD on 1/21/2021 at 8:22 AM          Xr Chest Ap Portable  (cpt=71045)    Result Date: 1/20/2021  CONCLUSION:  1. Acute viral pneumonia due to COVID-19 with slight interval worsening.  2. Stab full code   Palliative care following  PCP Dr. Arash Irene was updated today   CXR today is stable            Quality:  · Diet:  General  · PT/OT:  SNF  · DVT Prophylaxis: Lovenox  · CODE status: Full code confirmed with daughter  · Dispo: per clinical course

## 2021-01-22 NOTE — PROGRESS NOTES
INFECTIOUS DISEASE PROGRESS NOTE  [facility] ID TELEMEDICINE PROGRESS NOTE    Ezequiel Hilliard Patient Status:  Inpatient    1930 MRN [id number]   Location [facility] 5SW/SE Attending Apollo Momin MD   [facility] Pneumonia due to COVID-19 virus     Counseling regarding advance care planning and goals of care     Palliative care by specialist      ASSESSMENT:    Antibiotics: Ceftriaxone    80year old female with history of hypertension m, pre-DM, dementia, vitam

## 2021-01-22 NOTE — OCCUPATIONAL THERAPY NOTE
Attempt made for follow-up occupational therapy session. Pt on 100 FiO2 , 30 L/min via vapotherm. Per rehab protocol, defer if FiO2>80%. Pt with unstable respiratory status. Will continue to follow. RN aware.

## 2021-01-22 NOTE — PLAN OF CARE
Covid +: 21  Symptom Onset: 01/10/21  Tmax: Afebrile  O2: 30L Vapotherm @ 90%, wean as tolerated    Monitor Labs,    LDH: 266 -> 232 -> 259 -> 247 -> 300 -> 300  Ferritin: 590 -> 686 -> 806 -> 731 -> 564 -> 412  CRP: 12.6 -> 15.00 -> 8.15

## 2021-01-22 NOTE — SLP NOTE
Per RN, patient tolerates diet with no OVERT CSA. Pt remains on Vapotherm support and not appropriate for upgrade trials at this time. Will defer tx and SLP to assess for upgrade candidacy as patient able to wean from [facility]. Thank you.     Jey Hernández

## 2021-01-22 NOTE — RESPIRATORY THERAPY NOTE
Pt received  on Vapotherm settings 25L, FIO2-80%  During night FiO2 increased to 100% and flow 30L  Pt tolerating and saturating appropriately.

## 2021-01-22 NOTE — PROGRESS NOTES
Pulmonary/Critical Care Follow Up Note    HPI:   Soren Burr is a 80year old female with Patient presents with:  Seizure Disorder  Difficulty Breathing      PCP Chapo Guillaume MD  Admission Attending Cody Kuhn MD    Hospital Day #5    C/o mild so dexamethasone (DECADRON) tab 6 mg, 6 mg, Oral, Daily  •  sodium chloride 0.9% IV bolus 500 mL, 500 mL, Intravenous, PRN  •  Enoxaparin Sodium (LOVENOX) 40 MG/0.4ML injection 40 mg, 40 mg, Subcutaneous, Daily  •  acetaminophen (TYLENOL) tab 650 mg, 650 mg, HGB 13.7 01/22/2021    HCT 40.5 01/22/2021    .0 01/22/2021    CREATSERUM 0.54 01/22/2021    BUN 16 01/22/2021     01/22/2021    K 4.4 01/22/2021     01/22/2021    CO2 28.0 01/22/2021    GLU 99 01/22/2021    CA 8.5 01/22/2021    ALB 2.4

## 2021-01-22 NOTE — DIETARY NOTE
Nutrition Brief Note    Patient screened at no nutritional risk at admission by RN. Chart Review completed by RD due to COVID19+ and suspected poor PO intake. Intake %.     Oral nutrition supplements (ONS) initiated due to increased energy and protei

## 2021-01-22 NOTE — PHYSICAL THERAPY NOTE
Chart reviewed pt and consulted with RN. Pt currently on 30L, FiO2 100% (rehab protocol <80% FiO2). Will re-attempt pt tomorrow if medically appropriate.      Sudha Marquez, PT, DPT

## 2021-01-22 NOTE — CDS QUERY
.Heart Failure  CLINICAL DOCUMENTATION CLARIFICATION FORM  Dear Doctor: [address] information (provided below) includes a diagnosis of Heart Failure.  For accurate ICD-10-CM code assignment to reflect severity of illness and risk of mortality,  MEKA

## 2021-01-22 NOTE — PLAN OF CARE
Problem: Patient Centered Care  Goal: Patient preferences are identified and integrated in the patient's plan of care  Description: Interventions:  - What would you like us to know as we care for you?  I have a big family that helps take care of me  - Pro period  Description: INTERVENTIONS  - Monitor WBC  - Administer growth factors as ordered  - Implement neutropenic guidelines  Outcome: Progressing     Problem: SAFETY ADULT - FALL  Goal: Free from fall injury  Description: INTERVENTIONS:  - Assess pt freq effectiveness of vasoactive medications to optimize hemodynamic stability  - Monitor arterial and/or venous puncture sites for bleeding and/or hematoma  - Assess quality of pulses, skin color and temperature  - Assess for signs of decreased coronary artery mobilization  - Obtain PT/OT consults as needed  - Advance activity as appropriate  - Communicate ordered activity level and limitations with patient/family  Outcome: Progressing     Problem: MUSCULOSKELETAL - ADULT  Goal: Return mobility to safest level o activity  - Administer anti-seizure medications as ordered  - Monitor neurological status  Outcome: Progressing     Problem: Impaired Activities of Daily Living  Goal: Achieve highest/safest level of independence in self care  Description: Interventions:

## 2021-01-22 NOTE — PROGRESS NOTES
[facility]    Progress Note    Humza gN Patient Status:  Inpatient    1930 MRN [id number]   Location [facility] 5SW/SE Attending Allyn Lockwood MD   Hosp Day # 5 PCP Casey Delgado MD       Subjective:   Dominic Quintero on 1/21/2021 at 8:22 AM          Xr Chest Ap Portable  (cpt=71045)    Result Date: 1/20/2021  CONCLUSION:  1. Acute viral pneumonia due to COVID-19 with slight interval worsening. 2. Stable cardiomegaly.     Dictated by (CST): Luisito Silveira MD on 1/20/ ECHO - grade 1 DD, mild aortic stenosis, pulm HTN, EF 50%.      Hyponatremia - resolved.      HFpEF  Improved. - mild. Grade 1 DD on ECHO.    - stop IVF  - daily weights, I/O     Goals of care discussion  Patient is to be full code   Palliative care follo

## 2021-01-23 NOTE — PLAN OF CARE
Problem: Patient Centered Care  Goal: Patient preferences are identified and integrated in the patient's plan of care  Description: Interventions:  - What would you like us to know as we care for you?  I have a big family that helps take care of me  - Pro Monitor WBC  - Administer growth factors as ordered  - Implement neutropenic guidelines  Outcome: Progressing     Problem: RESPIRATORY - ADULT  Goal: Achieves optimal ventilation and oxygenation  Description: INTERVENTIONS:  - Assess for changes in respira increased intracranial pressure  - Maintain blood pressure and fluid volume within ordered parameters to optimize cerebral perfusion and minimize risk of hemorrhage  - Monitor temperature, glucose, and sodium.  Initiate appropriate interventions as ordered get out from bed

## 2021-01-23 NOTE — SLP NOTE
SPEECH DAILY NOTE - INPATIENT    ASSESSMENT & PLAN   ASSESSMENT    PT IS COVID 19 +. PPE REQUIRED. THIS SLP WORE GLOVES, GOWN, FACE SHIELD AND DROPLET MASK OVER N95 MASK. HANDS SANITIZED/WASHED UPON ENTRANCE/EXIT.       Pt alert, afebrile and on 30 L/min 90 Upright position; Slow rate;Small bites and sips; No straw  Medication Administration Recommendations: Whole in puree    Patient Experiencing Pain: No    Discharge Recommendations  Discharge Recommendations/Plan: Undetermined    Treatment Plan  Treatment Natalia

## 2021-01-23 NOTE — PROGRESS NOTES
[facility]    Progress Note    Guerrero Ramirez Patient Status:  Inpatient    1930 MRN [id number]   [facility] 5SW/SE Attending Sandra Odell MD   [facility] Day # 6 PCP Karenann Kawasaki, MD       Subjective:   Mariposa Collins 1/21/2021 at 8:22 AM               • cefTRIAXone  1 g Intravenous Q24H   • Acidophilus/Pectin  1 capsule Oral BID   • Albuterol Sulfate HFA  2 puff Inhalation QID   • dexamethasone  6 mg Oral Daily   • enoxaparin  40 mg Subcutaneous Daily   • docusate sodi with daughter  · Dispo: per clinical course        Greater than 35 minutes spent, >50% spent counseling re: treatment plan and workup.        Luis Estes MD  01/23/21

## 2021-01-23 NOTE — PROGRESS NOTES
[facility]    Progress Note    Livan Beckford Patient Status:  Inpatient    1930 MRN [id number]   Location [facility] 5SW/SE Attending Killian Marcleo MD   Hosp Day # 6 PCP Kristin Barajas MD        Subjective:     Constitut 01/22/2021    ALKPHO 79 01/22/2021    BILT 0.3 01/22/2021    TP 5.6 (L) 01/22/2021    AST 25 01/22/2021    ALT 32 01/22/2021    PTT 32.7 09/07/2018    INR 0.9 09/07/2018    TSH 2.130 04/21/2016    DDIMER 1.26 (H) 01/23/2021    ESRML 13 11/08/2017    CRP 1.

## 2021-01-23 NOTE — PLAN OF CARE
Problem: Patient Centered Care  Goal: Patient preferences are identified and integrated in the patient's plan of care  Description: Interventions:  - What would you like us to know as we care for you?  I have a big family that helps take care of me  - Pro Monitor WBC  - Administer growth factors as ordered  - Implement neutropenic guidelines  Outcome: Progressing     Problem: SAFETY ADULT - FALL  Goal: Free from fall injury  Description: INTERVENTIONS:  - Assess pt frequently for physical needs  - Identify hemodynamic stability  - Monitor arterial and/or venous puncture sites for bleeding and/or hematoma  - Assess quality of pulses, skin color and temperature  - Assess for signs of decreased coronary artery perfusion - ex.  Angina  - Evaluate fluid balance, a Communicate ordered activity level and limitations with patient/family  Outcome: Not Progressing  Goal: Maintain proper alignment of affected body part  Description: INTERVENTIONS:  - Support and protect limb and body alignment per provider's orders  - Ins tolerated activity level and precautions  - Recommend use of total lift for transfers  Outcome: Not Progressing     Problem: Impaired Activities of Daily Living  Goal: Achieve highest/safest level of independence in self care  Description: Interventions:

## 2021-01-23 NOTE — PHYSICAL THERAPY NOTE
Attempted PT treatment- pt currently on 30L/min vapotherm at 100% FiO2 with saturations at 89%. Patient is not stable to safely participate in therapy on this date. Due to this being 3rd PT attempt- will be placed on HOLD until medically stable.  Patient wi

## 2021-01-23 NOTE — OCCUPATIONAL THERAPY NOTE
Attempt made for OT follow-up treatment. Pt currently on 30L/min vapotherm at 100% FiO2 with saturations at 89%.  This is third OT treatment attempt --activity has been deferred due to respiratory status / not medically appropriate to participate in therapy

## 2021-01-24 NOTE — PLAN OF CARE
Sitting up in bed. VSS. ON vapotherm. Family at bedside.    Problem: Patient Centered Care  Goal: Patient preferences are identified and integrated in the patient's plan of care  Description: Interventions:  - What would you like us to know as we care for y anticipated neutropenic period  Description: INTERVENTIONS  - Monitor WBC  - Administer growth factors as ordered  - Implement neutropenic guidelines  Outcome: Progressing     Problem: SAFETY ADULT - FALL  Goal: Free from fall injury  Description: Alana Fischer Evaluate effectiveness of vasoactive medications to optimize hemodynamic stability  - Monitor arterial and/or venous puncture sites for bleeding and/or hematoma  - Assess quality of pulses, skin color and temperature  - Assess for signs of decreased corona consults as needed  - Advance activity as appropriate  - Communicate ordered activity level and limitations with patient/family  Outcome: Progressing  Goal: Maintain proper alignment of affected body part  Description: INTERVENTIONS:  - Support and protect mobility and gait  - Educate and engage patient/family in tolerated activity level and precautions    Outcome: Progressing     Problem: Impaired Activities of Daily Living  Goal: Achieve highest/safest level of independence in self care  Description: Inter

## 2021-01-24 NOTE — OCCUPATIONAL THERAPY NOTE
OCCUPATIONAL THERAPY TREATMENT NOTE - INPATIENT        Room Number: 003/107-Q      Problem List  Principal Problem:    ZHBRL-34 virus infection  Active Problems:    Hypoxia    Recurrent seizures ([facility]    Counseling regarding advance care planning and goals another person does the patient currently need…  -   Putting on and taking off regular lower body clothing?: A Little  -   Bathing (including washing, rinsing, drying)?: A Little  -   Toileting, which includes using toilet, bedpan or urinal? : A Lot  -   P

## 2021-01-24 NOTE — PHYSICAL THERAPY NOTE
PHYSICAL THERAPY TREATMENT NOTE - INPATIENT     Room Number: 678/110-C       Presenting Problem: +COVID (1/12/21)    Problem List  Principal Problem:    COVID-19 virus infection  Active Problems:    Hypoxia    Recurrent seizures ([facility] [address]    Counseling regardi hospital at a rehab facility versus home with home health pending progress. The patient's Approx Degree of Impairment: 61.29% has been calculated based on documentation in the [facility] '6 clicks' Inpatient Basic Mobility Short Form.   Research supports that pa hospital room?: A Lot   -   Climbing 3-5 steps with a railing?: Total     AM-PAC Score:  Raw Score: 14   Approx Degree of Impairment: 61.29%   Standardized Score (AM-PAC Scale): 38.1   CMS Modifier (G-Code): CL    FUNCTIONAL ABILITY STATUS  Gait Assessment

## 2021-01-24 NOTE — PROGRESS NOTES
[facility]    Progress Note    Jessy Fleeting Patient Status:  Inpatient    1930 MRN [id number]   Location [facility] 5SW/SE Attending Mary Kate Moura MD   [facility] Day # 7 PCP Tanesha Sanchez MD       Subjective:   Ariel Cost 6:47 PM     Finalized by (CST): Bhumika Baig MD on 1/23/2021 at 6:49 PM               • cefTRIAXone  1 g Intravenous Q24H   • Acidophilus/Pectin  1 capsule Oral BID   • Albuterol Sulfate HFA  2 puff Inhalation QID   • dexamethasone  6 mg Oral Daily Prophylaxis: Lovenox  · CODE status: Full code confirmed with daughter  · Dispo: per clinical course        Greater than 35 minutes spent, >50% spent counseling re: treatment plan and workup.        Home Bucio MD  01/24/21

## 2021-01-24 NOTE — PROGRESS NOTES
[facility]    Progress Note    Chiquita Eye Patient Status:  Inpatient    1930 MRN [id number]   Location [facility] 5SW/SE Attending Ziyad Lawrence MD   Hosp Day # 7 PCP Maria Luz Suero MD        Subjective:   Subjective: 01/24/2021    CA 8.6 01/24/2021    ALB 2.4 (L) 01/22/2021    ALKPHO 79 01/22/2021    BILT 0.3 01/22/2021    TP 5.6 (L) 01/22/2021    AST 25 01/22/2021    ALT 32 01/22/2021    PTT 32.7 09/07/2018    INR 0.9 09/07/2018    TSH 2.130 04/21/2016    DDIMER 1.26

## 2021-01-24 NOTE — PLAN OF CARE
VSS.  Vapotherm at 95/40. Good appetite. Up to chair with PT today. Tolerated well. Family at bedside updated to [facility] and agreeable. Bed and chair locked and alarmed and non skid socks in place.    Problem: Patient Centered Care  Goal: Patient preferences ar pre-medicate as appropriate  Outcome: Progressing     Problem: RISK FOR INFECTION - ADULT  Goal: Absence of fever/infection during anticipated neutropenic period  Description: INTERVENTIONS  - Monitor WBC  - Administer growth factors as ordered  - Thom INTERVENTIONS:  - Monitor vital signs, rhythm, and trends  - Monitor for bleeding, hypotension and signs of decreased cardiac output  - Evaluate effectiveness of vasoactive medications to optimize hemodynamic stability  - Monitor arterial and/or venous pun safe patient handling equipment as needed  - Ensure adequate protection for wounds/incisions during mobilization  - Obtain PT/OT consults as needed  - Advance activity as appropriate  - Communicate ordered activity level and limitations with patient/family mobility/gait  Description: Interventions:  - Assess patient's functional ability and stability  - Promote increasing activity/tolerance for mobility and gait  - Educate and engage patient/family in tolerated activity level and precautions    Outcome: Prog

## 2021-01-24 NOTE — PLAN OF CARE
Pt denies pain, chills, fever, nausea, vomiting. Medication reviewed. Vital signs monitored. Safety maintained. Daughter by the bed side. Call light in reach. We will continue to monitor.   Problem: Patient Centered Care  Goal: Patient preferences are ident pre-medicate as appropriate  Outcome: Progressing     Problem: RISK FOR INFECTION - ADULT  Goal: Absence of fever/infection during anticipated neutropenic period  Description: INTERVENTIONS  - Monitor WBC  - Administer growth factors as ordered  - Thom INTERVENTIONS:  - Monitor vital signs, rhythm, and trends  - Monitor for bleeding, hypotension and signs of decreased cardiac output  - Evaluate effectiveness of vasoactive medications to optimize hemodynamic stability  - Monitor arterial and/or venous pun safe patient handling equipment as needed  - Ensure adequate protection for wounds/incisions during mobilization  - Obtain PT/OT consults as needed  - Advance activity as appropriate  - Communicate ordered activity level and limitations with patient/family mobility/gait  Description: Interventions:  - Assess patient's functional ability and stability  - Promote increasing activity/tolerance for mobility and gait  - Educate and engage patient/family in tolerated activity level and precautions  - Recommend use

## 2021-01-25 NOTE — PHYSICAL THERAPY NOTE
Attempted PT treatment- pt supine in bed and just received lunch- dtr present in room assisting pt. Will reschedule.      Thank you,  Belinda Monteiro, PT, DPT

## 2021-01-25 NOTE — PLAN OF CARE
Pt denies pain, chills, fever, nausea, vomiting. Medication reviewed. Vital signs monitored. Safety maintained. Call light in reach. We will continue to monitor.   Problem: Patient Centered Care  Goal: Patient preferences are identified and integrated in th appropriate  Outcome: Progressing     Problem: RISK FOR INFECTION - ADULT  Goal: Absence of fever/infection during anticipated neutropenic period  Description: INTERVENTIONS  - Monitor WBC  - Administer growth factors as ordered  - Implement neutropenic gu Monitor vital signs, rhythm, and trends  - Monitor for bleeding, hypotension and signs of decreased cardiac output  - Evaluate effectiveness of vasoactive medications to optimize hemodynamic stability  - Monitor arterial and/or venous puncture sites for bl handling equipment as needed  - Ensure adequate protection for wounds/incisions during mobilization  - Obtain PT/OT consults as needed  - Advance activity as appropriate  - Communicate ordered activity level and limitations with patient/family  Outcome: Pr Interventions:  - Assess patient's functional ability and stability  - Promote increasing activity/tolerance for mobility and gait  - Educate and engage patient/family in tolerated activity level and precautions  - Recommend use of sit-stand lift for trans

## 2021-01-25 NOTE — PLAN OF CARE
Covid +: 01/12/21  Symptom Onset: 01/10/21  Tmax: Afebrile  O2: 40L Vapotherm @ 90%, wean as tolerated    Monitor Labs,downtrending, will stop monitoring  LDH: 266 -> 232 -> 259 -> 247 -> 300 -> 300 -> 283  Ferritin: 590 -> 686 -> 806 -> 731 -> 564 -> 412

## 2021-01-25 NOTE — SLP NOTE
SPEECH DAILY NOTE - INPATIENT    ASSESSMENT & PLAN   ASSESSMENT  PT COVID-19 POSITIVE. CONTACT AND DROPLET ISOLATION PPE REQUIRED. THIS THERAPIST WORE GOWN, GLOVES, FACE SHIELD, AND DROPLET/N95 RESPIRATOR MASK. HANDS SANITIZED/WASHED UPON ENTRANCE/EXIT. straws  Aspiration Precautions: Upright position; Slow rate;Small bites and sips; No straw  Medication Administration Recommendations: Whole in puree    Patient Experiencing Pain: No    Discharge Recommendations  Discharge Recommendations/Plan: Undetermined

## 2021-01-25 NOTE — PROGRESS NOTES
[facility]    CDI Prediction Tool Protocol (Vancomycin Initiated)    OVP (oral vancomycin prophylaxis) 125 mg PO Daily is being started in this patient based on a score of 13.       Score Breakdown:  High risk antibiotic use (5 points)  Hospital

## 2021-01-25 NOTE — OCCUPATIONAL THERAPY NOTE
Attempt made for occupational therapy treatment. Upon arrival, pt was supine in bed receiving assist from PCT for positioning for lunch , lunch at bedside. Will follow-up later this PM as schedule allows.

## 2021-01-25 NOTE — RESPIRATORY THERAPY NOTE
Pt remains on vapotherm during shift 40L/95%. FIO2 weans to 90%. Pt saturating appropriately 87-90%. RT continue to monitor.

## 2021-01-25 NOTE — PROGRESS NOTES
[place] [facility] - [facility]    Progress Note    Sagar Farmer Patient Status:  Inpatient    1930 MRN [id number]   Location [facility] 5SW/SE Attending Joy Harrington MD   [address] Day # 8 PCP Kristina Pepe MD       Subjective:   Rocio Putnam by (CST): Erika Ernandez MD on 1/23/2021 at 6:49 PM               • vancomycin HCl  125 mg Oral Daily   • cefTRIAXone  1 g Intravenous Q24H   • Acidophilus/Pectin  1 capsule Oral BID   • Albuterol Sulfate HFA  2 puff Inhalation QID   • dexamethasone  6 SNF  · DVT Prophylaxis: Lovenox  · CODE status: Full code confirmed with daughter  · Dispo: per clinical course        Greater than 35 minutes spent, >50% spent counseling re: treatment plan and workup.        Margarita Coronel MD  01/25/21

## 2021-01-25 NOTE — RESPIRATORY THERAPY NOTE
Patient received on vapotherm 40 L and fio2 90%,O2 sat 98%,flow decreased  To 30 L.  1105 :O2  Sat  93%,fio2  Down to 80%. RT will continue to monitor. 1410: fio2 75%,O2 sat 94%.

## 2021-01-25 NOTE — PLAN OF CARE
Patient up in chair for dinner. Patient's daughter, Melba Norris, at bedside throughout the day. IV infiltrated and removed. Patient denies any pain.     Problem: Patient Centered Care  Goal: Patient preferences are identified and integrated in the patient's plan Problem: RISK FOR INFECTION - ADULT  Goal: Absence of fever/infection during anticipated neutropenic period  Description: INTERVENTIONS  - Monitor WBC  - Administer growth factors as ordered  - Implement neutropenic guidelines  Outcome: Progressing     P trends  - Monitor for bleeding, hypotension and signs of decreased cardiac output  - Evaluate effectiveness of vasoactive medications to optimize hemodynamic stability  - Monitor arterial and/or venous puncture sites for bleeding and/or hematoma  - Assess adequate protection for wounds/incisions during mobilization  - Obtain PT/OT consults as needed  - Advance activity as appropriate  - Communicate ordered activity level and limitations with patient/family  Outcome: Progressing  Goal: Maintain proper alignm functional ability and stability  - Promote increasing activity/tolerance for mobility and gait  - Educate and engage patient/family in tolerated activity level and precautions  - Recommend use of  RW for transfers and ambulation  Outcome: Progressing

## 2021-01-26 NOTE — PLAN OF CARE
Patient up to chair today. Daughter, Anastasia Ramos, at bedside. No complaints of pain. Pt. Still on vapotherm. Safety measures in place.      Problem: Patient Centered Care  Goal: Patient preferences are identified and integrated in the patient's plan of care  Desc RISK FOR INFECTION - ADULT  Goal: Absence of fever/infection during anticipated neutropenic period  Description: INTERVENTIONS  - Monitor WBC  - Administer growth factors as ordered  - Implement neutropenic guidelines  Outcome: Progressing     Problem: SAF for bleeding, hypotension and signs of decreased cardiac output  - Evaluate effectiveness of vasoactive medications to optimize hemodynamic stability  - Monitor arterial and/or venous puncture sites for bleeding and/or hematoma  - Assess quality of pulses, protection for wounds/incisions during mobilization  - Obtain PT/OT consults as needed  - Advance activity as appropriate  - Communicate ordered activity level and limitations with patient/family  Outcome: Progressing  Goal: Maintain proper alignment of af ability and stability  - Promote increasing activity/tolerance for mobility and gait  - Educate and engage patient/family in tolerated activity level and precautions  - Recommend use of  RW for transfers and ambulation  Outcome: Progressing     Problem: Im

## 2021-01-26 NOTE — OCCUPATIONAL THERAPY NOTE
OCCUPATIONAL THERAPY TREATMENT NOTE - INPATIENT        Room Number: 565/737-Y                Problem List  Principal Problem:    EIZBD-96 virus infection  Active Problems:    Hypoxia    Recurrent seizures ([facility] [address]    Counseling regarding advance care planning Degree of Impairment: 50.11% has been calculated based on documentation in the [facility] '6 clicks' Inpatient Daily Activity Short Form. Research supports that patients with this level of impairment may benefit from MARY JO.     Pt does not demonstrate the physica NT    FUNCTIONAL ADL ASSESSMENT  Grooming: set-up in sitting   Feeding: SBA  Bathing: NT    Education Provided: role of OT, activity promotion, PLB and pacing   Patient End of Session: Up in chair;Needs met;Call light within reach;RN aware of session/findi

## 2021-01-26 NOTE — PROGRESS NOTES
[facility] HOSP - [facility]    Progress Note    Mahi Gary Patient Status:  Inpatient    1930 MRN [id number]   Location [facility] 5SW/SE Attending Leroy He MD   Hosp Day # 9 PCP Fareed Beebe MD        Subjective:    The patient ALB 2.4 (L) 01/22/2021    ALKPHO 79 01/22/2021    BILT 0.3 01/22/2021    TP 5.6 (L) 01/22/2021    AST 25 01/22/2021    ALT 32 01/22/2021    PTT 32.7 09/07/2018    INR 0.9 09/07/2018    TSH 2.130 04/21/2016    DDIMER 1.26 (H) 01/23/2021    ESRML 13 11/08

## 2021-01-26 NOTE — PLAN OF CARE
Pt denies chills, fever, nausea, vomiting. Pain controled with medication. Medication reviewed. Pt is still on Vapo term. Safety maintained. Call light in reach. Daughter still by the bed side. We will continue to monitor.   Problem: Patient Centered Care  G increased pain with activity and pre-medicate as appropriate  Outcome: Progressing     Problem: RISK FOR INFECTION - ADULT  Goal: Absence of fever/infection during anticipated neutropenic period  Description: INTERVENTIONS  - Monitor WBC  - Administer grow hemodynamic stability  Description: INTERVENTIONS:  - Monitor vital signs, rhythm, and trends  - Monitor for bleeding, hypotension and signs of decreased cardiac output  - Evaluate effectiveness of vasoactive medications to optimize hemodynamic stability with transfers and ambulation using safe patient handling equipment as needed  - Ensure adequate protection for wounds/incisions during mobilization  - Obtain PT/OT consults as needed  - Advance activity as appropriate  - Communicate ordered activity level highest/safest level of mobility/gait  Description: Interventions:  - Assess patient's functional ability and stability  - Promote increasing activity/tolerance for mobility and gait  - Educate and engage patient/family in tolerated activity level and prec

## 2021-01-26 NOTE — PROGRESS NOTES
Pulmonary/Critical Care Follow Up Note    HPI:   Olga Ellison is a 80year old female with Patient presents with:  Seizure Disorder  Difficulty Breathing      PCP Demetrice Guzman MD  Admission Attending Gladis Vasquez MD    Hospital Day #8    Denies sob mL, Intravenous, PRN  •  Enoxaparin Sodium (LOVENOX) 40 MG/0.4ML injection 40 mg, 40 mg, Subcutaneous, Daily  •  acetaminophen (TYLENOL) tab 650 mg, 650 mg, Oral, Q6H PRN  •  melatonin tab TABS 3 mg, 3 mg, Oral, Nightly PRN  •  docusate sodium (COLACE) cap BUN 17 01/25/2021     01/25/2021    K 4.8 01/25/2021     01/25/2021    CO2 31.0 01/25/2021     01/25/2021    CA 8.4 01/25/2021           ASSESSMENT/PLAN:     Syncope  Billed as Sz initially  H/o Sz d/o   Plan        Tele                S

## 2021-01-26 NOTE — PROGRESS NOTES
Covid positive- 1/12  Admitted- 1/17     CXR- 1/23-increased bilateral opacity     DVT prophylaxis- Lovenox 40mg daily     02- Vaptotherm 30L, FiO2 75%     LDH- 409 -up  CRP- <0.29-down  DD- 0.91-down  Ferritin- 233.7-down     Decadron- 1/17  CCP- family

## 2021-01-26 NOTE — PHYSICAL THERAPY NOTE
PHYSICAL THERAPY TREATMENT NOTE - INPATIENT     Room Number: 026/582-Y       Presenting Problem: +COVID (1/12/21)    Problem List  Principal Problem:    COVID-19 virus infection  Active Problems:    Hypoxia    [facility]    Counseling regardi care/supervision(family plans to take pt home with 24/7 care- will need HHPT)     PLAN  PT Treatment Plan: Bed mobility; Body mechanics; Patient education;Gait training;Transfer training;Balance training;Strengthening;Energy conservation; Endurance    SUBJECT 6  Assistive Device: Rolling walker  Pattern: Shuffle(decreased britany speed)  Stoop/Curb Assistance: Not tested         Patient End of Session: Up in chair;Needs met;Call light within reach;RN aware of session/findings; Alarm set; Family present    CURRENT

## 2021-01-27 NOTE — PROGRESS NOTES
[place] [facility] - [facility]    Progress Note    Ezequiel Hilliard Patient Status:  Inpatient    1930 MRN [id number]   Location [facility] 5SW/SE Attending Coco Warren MD   [facility] Day # 9 PCP Florentino Orozco MD       Subjective:   Noé Chiang • zinc sulfate  220 mg Oral Daily   • ascorbic acid  500 mg Oral BID   • famotidine  20 mg Oral Daily   • aspirin  81 mg Oral Daily   • Vitamin B-12  1,000 mcg Oral Daily   • Galantamine Hydrobromide  12 mg Oral BID with meals     haloperidol lactate, so

## 2021-01-27 NOTE — PROGRESS NOTES
[facility] HOSP - [facility]    Progress Note    Mahi Gary Patient Status:  Inpatient    1930 MRN [id number]   Location [facility] 5SW/SE Attending Jerica Conway MD   Hosp Day # 10 PCP Fareed Beebe MD       Subjective:   Marcellus Holland ascorbic acid  500 mg Oral BID   • famotidine  20 mg Oral Daily   • aspirin  81 mg Oral Daily   • Vitamin B-12  1,000 mcg Oral Daily   • Galantamine Hydrobromide  12 mg Oral BID with meals       Current PRN Inpatient Meds:      haloperidol lactate, sodium Specimen: Urine, clean catch   Result Value Ref Range    Urine Culture No Growth at 18-24 hrs. N/A   2.  BLOOD CULTURE     Status: None    Collection Time: 01/17/21  1:34 PM    Specimen: Blood,peripheral   Result Value Ref Range    Blood Culture Result No G

## 2021-01-27 NOTE — PROGRESS NOTES
Pulmonary/Critical Care Follow Up Note    HPI:   Livan Beckford is a 80year old female with Patient presents with:  Seizure Disorder  Difficulty Breathing      PCP Kristin Barajas MD  Admission Attending Lisa Weller MD    Hospital Day #10    Denies sob (DECADRON) tab 6 mg, 6 mg, Oral, Daily  •  sodium chloride 0.9% IV bolus 500 mL, 500 mL, Intravenous, PRN  •  Enoxaparin Sodium (LOVENOX) 40 MG/0.4ML injection 40 mg, 40 mg, Subcutaneous, Daily  •  acetaminophen (TYLENOL) tab 650 mg, 650 mg, Oral, Q6H PRN 42.3 01/27/2021    .0 01/27/2021    CREATSERUM 0.60 01/27/2021    BUN 22 01/27/2021     01/27/2021    K 4.4 01/27/2021     01/27/2021    CO2 30.0 01/27/2021    GLU 79 01/27/2021    CA 8.2 01/27/2021    DDIMER 0.80 01/27/2021    CRP 4.03

## 2021-01-27 NOTE — SLP NOTE
RN,Ryann, reports patient tolerates modified diet with no OVERT CSA. SLP attempt for upgrade candidacy for thin liquids; however, patient remains on Vapotherm 25L/min 60% FiO2.  SLP to f/u as patient able to be weaned from HF and respiratory status impro

## 2021-01-27 NOTE — PLAN OF CARE
Covid +: 01/12/21  Symptom Onset: 01/10/21  Tmax: Afebrile  O2: 30L Vapotherm with FiO2 at 65% @ 90%, wean as tolerated    Monitor Labs,downtrending, will stop monitoring  LDH: 266 -> 232 -> 259 -> 247 -> 300 -> 300 -> 283  Ferritin: 590 -> 686 -> 806 -> 7

## 2021-01-27 NOTE — RESPIRATORY THERAPY NOTE
Patient received on vapotherm 30 L and fio2 70%,O2 sat 91%,fio2 decreased to 65%. 1128 am: O2 sat 95%,fio2 decreased to 60% and flow to 25 L.    1353: O2 sat 96%,fio2 decreased to 55%.

## 2021-01-28 NOTE — OCCUPATIONAL THERAPY NOTE
OCCUPATIONAL THERAPY TREATMENT NOTE - INPATIENT        Room Number: 094/069-P                Problem List  Principal Problem:    [id number] virus infection  Active Problems:    Hypoxia    Recurrent seizures ([facility] [address]    Counseling regarding advance care planning that patients with this level of impairment may benefit from MARY JO. Pt does not demonstrate the physical skills required to safely return home. Recommend MARY JO to maximize participation, independence, and safety.        DISCHARGE RECOMMENDATIONS   N [place] Reis session/findings; Family present    OT Goals:       Patient will complete functional transfer with SBA Alexandra Weaver will complete toileting with SBA Alexandra Weaver will tolerate standing for 4 minutes in prep for adls with SBA

## 2021-01-28 NOTE — PLAN OF CARE
Problem: Patient Centered Care  Goal: Patient preferences are identified and integrated in the patient's plan of care  Description: Interventions:  - What would you like us to know as we care for you?  I have a big family that helps take care of me  - Pro Monitor WBC  - Administer growth factors as ordered  - Implement neutropenic guidelines  Outcome: Progressing     Problem: SAFETY ADULT - FALL  Goal: Free from fall injury  Description: INTERVENTIONS:  - Assess pt frequently for physical needs  - Identify caregiver  - Include patient/family/discharge partner in discharge planning  - Arrange for needed discharge resources and transportation as appropriate  - Identify discharge learning needs (meds, wound care, etc)  - Arrange for interpreters to assist at [facility] ordered activity level and limitations with patient/family  Outcome: Progressing  Goal: Maintain proper alignment of affected body part  Description: INTERVENTIONS:  - Support and protect limb and body alignment per provider's orders  - Instruct and reinfo

## 2021-01-28 NOTE — PHYSICAL THERAPY NOTE
PHYSICAL THERAPY TREATMENT NOTE - INPATIENT     Room Number: 324/816-W       Presenting Problem: +COVID (1/12/21)    Problem List  Principal Problem:    COVID-19 virus infection  Active Problems:    Hypoxia    Recurrent seizures ([facility]    Counseling regardi in reach, family present, RN notified of pt's functional mobility. Recommended to RN to use two assist due to lines/tubes, stand pivot.    .  The patient's Approx Degree of Impairment: 68.66% has been calculated based on documentation in the [facility] '6 clicks and from a bed to a chair (including a wheelchair)?: A Lot   -   Need to walk in hospital room?: A Lot   -   Climbing 3-5 steps with a railing?: Total     AM-PAC Score:  Raw Score: 12   Approx Degree of Impairment: 68.66%   Standardized Score (AM-PAC Scale

## 2021-01-28 NOTE — PROGRESS NOTES
Pulmonary/Critical Care Follow Up Note    HPI:   Gina Light is a 80year old female with Patient presents with:  Seizure Disorder  Difficulty Breathing      PCP Siva Sena MD  Admission Attending Nirav Becerra MD    Hospital Day #11    Denies sob PRN  •  Albuterol Sulfate  (90 Base) MCG/ACT inhaler 2 puff, 2 puff, Inhalation, QID  •  dexamethasone (DECADRON) tab 6 mg, 6 mg, Oral, Daily  •  sodium chloride 0.9% IV bolus 500 mL, 500 mL, Intravenous, PRN  •  Enoxaparin Sodium (LOVENOX) 40 MG/0. Abd soft       LABS:    Lab Results   Component Value Date    WBC 14.7 01/28/2021    HGB 13.6 01/28/2021    HCT 41.3 01/28/2021    .0 01/28/2021    CREATSERUM 0.66 01/28/2021    BUN 16 01/28/2021     01/28/2021    K 5.1 01/28/2021

## 2021-01-28 NOTE — PLAN OF CARE
Problem: Patient Centered Care  Goal: Patient preferences are identified and integrated in the patient's plan of care  Description: Interventions:  - What would you like us to know as we care for you?  I have a big family that helps take care of me  - Pro Monitor WBC  - monitor vital signs  -antibiotics  -tylenol  -blood cultures, lactic acid  Outcome: Progressing     Problem: SAFETY ADULT - FALL  Goal: Free from fall injury  Description: INTERVENTIONS:  - Assess pt frequently for physical needs  - Identify hemodynamic stability  - Monitor arterial and/or venous puncture sites for bleeding and/or hematoma  - Assess quality of pulses, skin color and temperature  - Assess for signs of decreased coronary artery perfusion - ex.  Angina  - Evaluate fluid balance, a ordered activity level and limitations with patient/family  Outcome: Progressing  Goal: Maintain proper alignment of affected body part  Description: INTERVENTIONS:  - Support and protect limb and body alignment per provider's orders  - Instruct and reinfo activity level and precautions    Outcome: Progressing     Problem: Impaired Activities of Daily Living  Goal: Achieve highest/safest level of independence in self care  Description: Interventions:  - Assess ability and encourage patient to participate in

## 2021-01-28 NOTE — RESPIRATORY THERAPY NOTE
Received patient on Vapotherm 25L 55%. Sat 90-93% throughout the day. No changes made. Will continue to monitor. Urine pregnancy negative varified by Augusta Walters and Dr Sedrick Ortiz, RN  12/13/17 [id number]

## 2021-01-28 NOTE — PROGRESS NOTES
[facility]    Progress Note    Livan Beckford Patient Status:  Inpatient    1930 MRN [id number]   Location [facility] 5SW/SE Attending Antelmo Zhegn MD   [facility] Day # 11 PCP Kristin Barajas MD       Subjective:   Edgardo Mcguire mg Oral BID   • famotidine  20 mg Oral Daily   • aspirin  81 mg Oral Daily   • Vitamin B-12  1,000 mcg Oral Daily   • Galantamine Hydrobromide  12 mg Oral BID with meals       Current PRN Inpatient Meds:      haloperidol lactate, sodium chloride 0.9%, acet PM    Specimen: Blood,peripheral   Result Value Ref Range    Blood Culture Result No Growth 5 Days N/A       Imaging/EKG:   No results found. Assessment and Plan:     COVID 19 Pneumonia   · Symptom onset 1/10.  Tested + 1/12  · Decadron day #11 will co

## 2021-01-28 NOTE — PLAN OF CARE
Covid +: 01/12/21  Symptom Onset: 01/10/21  Tmax: Afebrile  O2: 25L Vapotherm with FiO2 at 55% @ 90%, wean as tolerated    Monitor Labs,downtrending, will stop monitoring  LDH: 266 -> 232 -> 259 -> 247 -> 300 -> 300 -> 283  Ferritin: 590 -> 686 -> 806 -> 7

## 2021-01-29 NOTE — PROGRESS NOTES
Covid positive- 1/12  Admitted- 1/17     CXR-   1/23-increased bilateral opacity  1/29- mild interval worsening     DVT prophylaxis- Lovenox 40mg daily     02- 10L, weaned down this morning from 25L     Decadron- 1/17  CCP- family refused  Actemra- s/p

## 2021-01-29 NOTE — PROGRESS NOTES
[facility]    Progress Note    Alfred Haas Patient Status:  Inpatient    1930 MRN [id number]   Location [facility] 5SW/SE Attending Aren Estevse MD   Hosp Day # 12 PCP Brad Mckinney MD       Subjective:   Divya Zamorano Oral BID   • zinc sulfate  220 mg Oral Daily   • ascorbic acid  500 mg Oral BID   • famotidine  20 mg Oral Daily   • aspirin  81 mg Oral Daily   • Vitamin B-12  1,000 mcg Oral Daily   • Galantamine Hydrobromide  12 mg Oral BID with meals       Current PRN AM     Finalized by (CST): Laly Garner MD on 1/29/2021 at 7:21 AM              Assessment and Plan:     COVID 19 Pneumonia   · Symptom onset 1/10. Tested + 1/12  · Decadron day #12 stop after today   · S/p RDV x 5 doses, Actemra given 1/20.  Family r

## 2021-01-29 NOTE — RESPIRATORY THERAPY NOTE
4031 vapotherm 25l 50% sats 100%  0809 vapotherm 25l 45% sats  99%  0816 hfnc 10lpm sats 93%  1355 hfnc 10lpm sats  94%

## 2021-01-29 NOTE — RESPIRATORY THERAPY NOTE
Pt remains on vapotherm 25L/55%. FIO2 weaned to 50%, pt saturating appropriating 88-91% on this shift.

## 2021-01-29 NOTE — DIETARY NOTE
Nutrition Brief Note--from 1/22, update at bottom   Brief Nutrition Note:  Patient screened at no nutritional risk at admission by RN. Chart Review completed by RD due to COVID19+ and suspected poor PO intake. Intake %.     Oral nutrition supplements

## 2021-01-30 NOTE — PROGRESS NOTES
[facility]    Progress Note    Sagar Farmer Patient Status:  Inpatient    1930 MRN [id number]   Location [facility] 5SW/SE Attending Daphnie Givens MD   [facility] Day # 15 PCP Kristina Pepe MD       Subjective:   [place] Puls sulfate  220 mg Oral Daily   • ascorbic acid  500 mg Oral BID   • famotidine  20 mg Oral Daily   • aspirin  81 mg Oral Daily   • Vitamin B-12  1,000 mcg Oral Daily   • Galantamine Hydrobromide  12 mg Oral BID with meals       Current PRN Inpatient Meds: Assessment and Plan:     COVID 19 Pneumonia   · Symptom onset 1/10. Tested + 1/12  · Decadron day #12 stop 1/29   · S/p RDV x 5 doses, Actemra given 1/20. Family refused CCP  · Completed 7 days of IV rocephin.    · Pepcid, Zinc, melatonin, vitamin

## 2021-01-30 NOTE — PLAN OF CARE
Problem: Patient Centered Care  Goal: Patient preferences are identified and integrated in the patient's plan of care  Description: Interventions:  - What would you like us to know as we care for you?  I have a big family that helps take care of me  - Pro Monitor WBC  - monitor vital signs  -antibiotics  -tylenol  -blood cultures, lactic acid  Outcome: Progressing     Problem: DISCHARGE PLANNING  Goal: Discharge to home or other facility with appropriate resources  Description: INTERVENTIONS:  - Identify ba arrhythmias  - Monitor electrolytes and administer replacement therapy as ordered  Outcome: Progressing  VSS, titrated O2 to 8l with o2 sat 94-95%,up wit assist/walker to chair,family at bedside,fall precautions/isolation precaution maintained

## 2021-01-30 NOTE — PROGRESS NOTES
[facility]     Progress Note        Poppy Garcia Patient Status:  Inpatient    1930 MRN [id number]   Location [facility] 5SW/SE Attending Raquel Hilliard MD   Hosp Day # 15 PCP Isabelle Ansari MD       Subjective:   Patient enema, Rectal, Once PRN    •  ondansetron HCl (ZOFRAN) injection 4 mg, 4 mg, Intravenous, Q6H PRN    •  zinc sulfate (ZINCATE) cap 220 mg, 220 mg, Oral, Daily    •  ascorbic acid (VITAMIN C) tab 500 mg, 500 mg, Oral, BID    •  guaiFENesin-codeine (ROBITUSS Dementia     Plan   -Patient presents after evidence of syncopal episode. Found to be positive for COVID-19 on 1/12/2021.  -Status post remdesivir  -Status post Actemra  -Decadron course to be completed today  -Wean oxygen as tolerated.   Currently on 10 L

## 2021-01-30 NOTE — PLAN OF CARE
Pt is alert and up to chair today. Off vapotherm and on 10L high flow nasal canula. Pt tolerating diet and nectar thick diet. Up with assit and walker. Chair alarm on. Continue to monitor.      Problem: PAIN - ADULT  Goal: Verbalizes/displays adequate comfo services based on physician/LIP order or complex needs related to functional status, cognitive ability or social support system  Outcome: Progressing     Problem: SAFETY ADULT - FALL  Goal: Free from fall injury  Description: INTERVENTIONS:  - Assess pt fr

## 2021-01-31 NOTE — PLAN OF CARE
Problem: Patient Centered Care  Goal: Patient preferences are identified and integrated in the patient's plan of care  Description: Interventions:  - What would you like us to know as we care for you?  I have a big family that helps take care of me  - Pro WBC  - monitor vital signs  -antibiotics  -tylenol  -blood cultures, lactic acid  Outcome: Progressing     Problem: SAFETY ADULT - FALL  Goal: Free from fall injury  Description: INTERVENTIONS:  - Assess pt frequently for physical needs  - Identify cogniti hemodynamic stability  - Monitor arterial and/or venous puncture sites for bleeding and/or hematoma  - Assess quality of pulses, skin color and temperature  - Assess for signs of decreased coronary artery perfusion - ex.  Angina  - Evaluate fluid balance, a patient/family  Outcome: Progressing  Goal: Maintain proper alignment of affected body part  Description: INTERVENTIONS:  - Support and protect limb and body alignment per provider's orders  - Instruct and reinforce with patient and family use of appropria Progressing     Problem: Impaired Activities of Daily Living  Goal: Achieve highest/safest level of independence in self care  Description: Interventions:  - Assess ability and encourage patient to participate in ADLs to maximize function  - Promote sittin

## 2021-01-31 NOTE — PROGRESS NOTES
[facility]    Progress Note    Coral Silveira Patient Status:  Inpatient    1930 MRN [id number]   Location [facility] 5SW/SE Attending Ravi Oconnell MD   [facility] Day # 14 PCP Estefany Paige MD       Subjective:   Tonio Steward BID   • zinc sulfate  220 mg Oral Daily   • ascorbic acid  500 mg Oral BID   • famotidine  20 mg Oral Daily   • aspirin  81 mg Oral Daily   • Vitamin B-12  1,000 mcg Oral Daily   • Galantamine Hydrobromide  12 mg Oral BID with meals       Current PRN Inpat markers. · Swallow eval done - chopped diet. · Add Flutter valve and mucinex today   · Pulm following.    · Wean oxygen as tolerated keep spo2 88% or higher.      Advanced Alzheimer's dementia  · Cont home galantamine.      Vitamin B12 def  · Cont home

## 2021-02-01 NOTE — CM/SW NOTE
Care Progression Note:  Active Acute Medical Issue:   COVID-19 virus infection     Other Contributing Medical Factors/Dx. : Advanced Dementia    Length of stay: 15  GMLOS: 5.4  Avoidable Delays: NA  Discharge Barriers: Care giving/ADL support and Physical/e

## 2021-02-01 NOTE — PHYSICAL THERAPY NOTE
PHYSICAL THERAPY TREATMENT NOTE - INPATIENT     Room Number: 656/859-J       Presenting Problem: +COVID (1/12/21)    Problem List  Principal Problem:    COVID-19 virus infection  Active Problems:    Hypoxia    Recurrent seizures ([facility] [address]    Counseling regardi mechanics; Endurance; Energy conservation;Patient education; Family education;Gait training;Neuromuscular re-educate;Range of motion;Strengthening;Stoop training;Stair training;Transfer training;Balance training    SUBJECTIVE  \"I feel like I'm going to fall. EXERCISES  Lower Extremity Ankle pumps     Position Sitting       Patient End of Session: Up in chair; Alarm set; Family present;Call light within reach;RN aware of session/findings    CURRENT GOALS   Goals to be met by: 2/2/21  Patient Goal Patient's self-s

## 2021-02-01 NOTE — SLP NOTE
SPEECH DAILY NOTE - INPATIENT    ASSESSMENT & PLAN   ASSESSMENT  PT COVID-19 POSITIVE. CONTACT AND DROPLET ISOLATION PPE REQUIRED. THIS THERAPIST WORE GOWN, GLOVES, FACE SHIELD, AND DROPLET/N95 RESPIRATOR MASK. HANDS SANITIZED/WASHED UPON ENTRANCE/EXIT. next level of care     Treatment Plan  Treatment Plan/Recommendations: Aspiration precautions    Interdisciplinary Communication: Discussed with RN            GOALS  Goal #1 The patient will tolerate chopped consistency and NECTAR-thick liquids without ove [phone]

## 2021-02-01 NOTE — OCCUPATIONAL THERAPY NOTE
OCCUPATIONAL THERAPY TREATMENT NOTE - INPATIENT        Room Number: 459/129-W                Problem List  Principal Problem:    [id number] virus infection  Active Problems:    Hypoxia    Recurrent seizures ([facility] [address]    Counseling regarding advance care planning '6 clicks' Inpatient Daily Activity Short Form. Research supports that patients with this level of impairment may benefit from MARY JO.         DISCHARGE RECOMMENDATIONS  OT Discharge Recommendations: Sub-acute rehabilitation(pending progress)        PLAN  OT functional transfer with SBA David Escamilla will complete toileting with SBA David Francine will tolerate standing for 4 minutes in prep for adls with SBA   Comment:Ongoing    Patient will complete item retrieval with SBA  [place]

## 2021-02-01 NOTE — PROGRESS NOTES
Pulmonary/Critical Care Follow Up Note    HPI:   Suri Huff is a 80year old female with Patient presents with:  Seizure Disorder  Difficulty Breathing      PCP Lynnette Porter MD  Admission Attending Tiffany Onofre MD    Hospital Day #15    No distres 1 mg, Intravenous, Q6H PRN  •  Albuterol Sulfate  (90 Base) MCG/ACT inhaler 2 puff, 2 puff, Inhalation, QID  •  sodium chloride 0.9% IV bolus 500 mL, 500 mL, Intravenous, PRN  •  Enoxaparin Sodium (LOVENOX) 40 MG/0.4ML injection 40 mg, 40 mg, Subcut Value Date    WBC 8.0 02/01/2021    HGB 13.6 02/01/2021    HCT 41.1 02/01/2021    .0 02/01/2021    CREATSERUM 0.62 02/01/2021    BUN 26 02/01/2021     02/01/2021    K 5.0 02/01/2021     02/01/2021    CO2 30.0 02/01/2021    GLU 75 02/01/2

## 2021-02-01 NOTE — PROGRESS NOTES
Covid positive- 1/12  Admitted- 1/17     CXR-   1/23- increased bilateral opacity  1/29- mild interval worsening     DVT prophylaxis- Lovenox 40mg daily     02- 4L, tolerating weaning of O2     Decadron- 1/17  CCP- family refused  Actemra- s/p  RDV- s/p

## 2021-02-01 NOTE — PLAN OF CARE
Pt alert with stable vital signs. Down to 5L nasal canula. No complaints of pain or shortness of breath. PT/OT on consult. Up to chair for most of day. Walking test attempted and pt not tolerating. MD aware of increased oxygen needs for O2 walk.  Tolerating Problem: CARDIOVASCULAR - ADULT  Goal: Maintains optimal cardiac output and hemodynamic stability  Description: INTERVENTIONS:  - Monitor vital signs, rhythm, and trends  - Monitor for bleeding, hypotension and signs of decreased cardiac output  - Evalua assistive devices  - Assist with transfers and ambulation using safe patient handling equipment as needed  - Ensure adequate protection for wounds/incisions during mobilization  - Obtain PT/OT consults as needed  - Advance activity as appropriate  - Commun

## 2021-02-01 NOTE — CM/SW NOTE
130 PM: SW monitoring for home O2. RN to complete walk test to determine home O2 needs.  SW to initiate tentative referral and order post walk test.    Documentation for O2 sats: (RN to add to progress note)  Patient's O2 sat on room air is ____% at rest. P ROBER   [id number]

## 2021-02-01 NOTE — PROGRESS NOTES
Pt's O2 sat on 4L at rest is 91%. Pt's O2 sat is 81% when ambulating. Pt's O2 sat on 7L at rest is 94%. Pt's O2 sat is 83% when ambulating.

## 2021-02-01 NOTE — PLAN OF CARE
Problem: Patient Centered Care  Goal: Patient preferences are identified and integrated in the patient's plan of care  Description: Interventions:  - What would you like us to know as we care for you?  I have a big family that helps take care of me  - Pro WBC  - monitor vital signs  -antibiotics  -tylenol  -blood cultures, lactic acid  Outcome: Progressing     Problem: SAFETY ADULT - FALL  Goal: Free from fall injury  Description: INTERVENTIONS:  - Assess pt frequently for physical needs  - Identify cogniti saturation or ABGs  - Encourage broncho-pulmonary hygiene including cough, deep breathe, Incentive Spirometry  - Assess the need for suctioning and perform as needed  - Assess and instruct to report SOB or any respiratory difficulty  - Respiratory Therapy BM,triamcinolone cream ordered for LLE,high fall precautions in place,daughter at bedside,plan of care updated

## 2021-02-02 NOTE — PLAN OF CARE
Pt alert and on 5L oxygen. Walking test done and charted. Pt desats with exertion. 2 assist and up to chair for most of day. No complaints of pain. Tolerating diet and nectar thick liquids. Incontinent care provided throughout shift.  Continue to wean oxyge home or other facility with appropriate resources  Description: INTERVENTIONS:  - Identify barriers to discharge w/pt and caregiver  - Include patient/family/discharge partner in discharge planning  - Arrange for needed discharge resources and transportati during self-care    Outcome: Progressing

## 2021-02-02 NOTE — PROGRESS NOTES
[facility]    Progress Note    Soren Burr Patient Status:  Inpatient    1930 MRN [id number]   Location [facility] 5SW/SE Attending Alexis Monteiro MD   [facility] Day # 16 PCP Chapo Guillaume MD        Subjective:   Seen and CA 8.2 (L) 02/01/2021    ALB 2.5 (L) 01/30/2021    ALKPHO 62 01/30/2021    BILT 0.5 01/30/2021    TP 5.6 (L) 01/30/2021    AST 22 01/30/2021    ALT 27 01/30/2021    PTT 32.7 09/07/2018    INR 0.9 09/07/2018    TSH 2.130 04/21/2016    DDIMER 0.56 01/28/2

## 2021-02-02 NOTE — PROGRESS NOTES
[facility]    Progress Note    Soren Burr Patient Status:  Inpatient    1930 MRN [id number]   Location [facility] 5SW/SE Attending Alexis Monteiro MD   Hosp Day # 15 PCP Chapo Guillaume MD       Subjective:   David Booth Albuterol Sulfate HFA  2 puff Inhalation QID   • enoxaparin  40 mg Subcutaneous Daily   • docusate sodium  100 mg Oral BID   • zinc sulfate  220 mg Oral Daily   • ascorbic acid  500 mg Oral BID   • famotidine  20 mg Oral Daily   • aspirin  81 mg Oral Daily Family refused CCP  · Completed 7 days of IV rocephin. · Pepcid, Zinc, melatonin, vitamin C.   · Monitor inflammatory markers. · Swallow eval done - chopped diet. · Flutter valve and mucinex today   · Pulm following.    · Wean oxygen as tolerated keep

## 2021-02-02 NOTE — PLAN OF CARE
Covid +: 01/12/21  Symptom Onset: 01/10/21  Tmax: Afebrile  O2: 4L @ 92%, wean as tolerated    Monitor Labs,downtrending, will stop monitoring  LDH: 266 -> 232 -> 259 -> 247 -> 300 -> 300 -> 283  Ferritin: 590 -> 686 -> 806 -> 731 -> 564 -> 412 -> 274  CRP

## 2021-02-03 NOTE — PROGRESS NOTES
Pulmonary/Critical Care Follow Up Note    HPI:   Mallory Swain is a 80year old female with Patient presents with:  Seizure Disorder  Difficulty Breathing      PCP Guillermo Holbrook MD  Admission Attending Vickie Sam MD    Hospital Day #17    No distres 5 MG/ML injection 1 mg, 1 mg, Intravenous, Q6H PRN  •  Albuterol Sulfate  (90 Base) MCG/ACT inhaler 2 puff, 2 puff, Inhalation, QID  •  sodium chloride 0.9% IV bolus 500 mL, 500 mL, Intravenous, PRN  •  Enoxaparin Sodium (LOVENOX) 40 MG/0.4ML inject edema        LABS:             ASSESSMENT/PLAN:     Syncope  Billed as Sz initially  H/o Sz d/o   Plan        Tele                Sz precautions                Keppra     COVID-19  Mod inflammation  Pt minimally symptomatic  Abnl CXR  Mild resp distress  S

## 2021-02-03 NOTE — PROGRESS NOTES
[facility]    [facility] Note    Rian Ansari Patient Status:  Inpatient    1930 MRN [id number]   Location [facility] 5SW/SE Attending Suresh Martines MD   [facility] Day # 16 PCP Roselia Yu MD       Subjective:   Daphne Evans Inhalation QID   • enoxaparin  40 mg Subcutaneous Daily   • docusate sodium  100 mg Oral BID   • zinc sulfate  220 mg Oral Daily   • ascorbic acid  500 mg Oral BID   • famotidine  20 mg Oral Daily   • aspirin  81 mg Oral Daily   • Vitamin B-12  1,000 mcg O 7 days of IV rocephin. · Pepcid, Zinc, melatonin, vitamin C.   · Monitor inflammatory markers. · Swallow eval done - chopped diet. · Flutter valve and mucinex today   · Pulm following.    · Wean oxygen as tolerated keep spo2 88% or higher.      Advanc

## 2021-02-03 NOTE — OCCUPATIONAL THERAPY NOTE
OCCUPATIONAL THERAPY TREATMENT NOTE - INPATIENT        Room Number: 846/552-M    Problem List  Principal Problem:    ISPJS-80 virus infection  Active Problems:    Hypoxia    Recurrent seizures ([facility] [address]    Counseling regarding advance care planning and goals transfer training; Endurance training;Patient/Family education;Patient/Family training;Equipment eval/education; Compensatory technique education    SUBJECTIVE  Pt agreeable to therapy session on this date    OBJECTIVE  Precautions: Bed/chair alarm;Seizure(i will complete functional transfer with CGA  Comment:     Patient will complete toileting with MIN A  Comment:     Patient will tolerate standing for 2-3 minutes in prep for adls with CGA while maintaining SPO2 >90%   Comment:    Patient will complete item

## 2021-02-03 NOTE — PROGRESS NOTES
[facility]    Progress Note    Mahi Gary Patient Status:  Inpatient    1930 MRN [id number]   Location [facility] 5SW/SE Attending Jerica Conway MD   Hosp Day # 16 PCP Fareed Beebe MD       Subjective:   Marcellus Holland famotidine  20 mg Oral Daily   • aspirin  81 mg Oral Daily   • Vitamin B-12  1,000 mcg Oral Daily   • Galantamine Hydrobromide  12 mg Oral BID with meals       Current PRN Inpatient Meds:      haloperidol lactate, sodium chloride 0.9%, acetaminophen, stacy on 2/03/2021 at 10:47 AM     Finalized by (CST): Robert Martines MD on 2/03/2021 at 10:49 AM              Assessment and Plan:     COVID 19 Pneumonia   · Symptom onset 1/10.  Tested + 1/12  · Decadron completed 12 days   · S/p RDV x 5 doses, Actemra given 1/

## 2021-02-03 NOTE — SLP NOTE
SPEECH DAILY NOTE - INPATIENT    ASSESSMENT & PLAN   ASSESSMENT  PT COVID-19 POSITIVE. CONTACT AND DROPLET ISOLATION PPE REQUIRED. THIS THERAPIST WORE GOWN, GLOVES, FACE SHIELD, AND DROPLET/N95 RESPIRATOR MASK. HANDS SANITIZED/WASHED UPON ENTRANCE/EXIT. GOALS  Goal #1 The patient will tolerate chopped consistency and NECTAR-thick liquids without overt signs or symptoms of aspiration with 100 % accuracy over 1-2 session(s).     Pt tolerate chopped solids and nectar thickened liquids via cup across 100%

## 2021-02-03 NOTE — PROGRESS NOTES
Pt covid+ 1/12. Symptoms started 1/10  Currently on 5L NC SpO2 95%, afebrile. Pt still continues to de-sat with activity and requires 7L to maintain O2 sat > 88%.      CCP - family refused  S/P Actemra 1/20  S/P 5 day course of RDV (1/17 - 1/21)  S/P 7 day

## 2021-02-03 NOTE — PHYSICAL THERAPY NOTE
PHYSICAL THERAPY TREATMENT NOTE - INPATIENT     Room Number: 325/168-H       Presenting Problem: +COVID (1/12/21)    Problem List  Principal Problem:    COVID-19 virus infection  Active Problems:    Hypoxia    Recurrent seizures ([facility] [address]    Counseling regardi RECOMMENDATIONS  PT Discharge Recommendations: Sub-acute rehabilitation(family plans to take pt home- 24/7 assist HHPT)     PLAN  PT Treatment Plan: Bed mobility; Body mechanics; Endurance; Energy conservation;Patient education; Family education;Gait training; Modifier (G-Code): CK    FUNCTIONAL ABILITY STATUS  Gait Assessment   Gait Assistance: Minimum assistance(chair follow)  Distance (ft): 10ft  Assistive Device: Rolling walker  Pattern: Shuffle(flexed posture; decreased britany)  Stoop/Curb Assistance: Not

## 2021-02-03 NOTE — PLAN OF CARE
Pt denies pain, chills, fever, nausea, vomiting. Pt is on 5 L high flow NC. Medication reviewed. Vital signs monitored. Call light in reach. We will continue to monitor.   Problem: Patient Centered Care  Goal: Patient preferences are identified and integrat appropriate  Outcome: Progressing     Problem: RISK FOR INFECTION - ADULT  Goal: Absence of fever/infection during anticipated neutropenic period  Description: INTERVENTIONS  - Monitor WBC  - monitor vital signs  -antibiotics  -tylenol  -blood cultures, la Monitor vital signs, rhythm, and trends  - Monitor for bleeding, hypotension and signs of decreased cardiac output  - Evaluate effectiveness of vasoactive medications to optimize hemodynamic stability  - Monitor arterial and/or venous puncture sites for bl protection for wounds/incisions during mobilization  - Obtain PT/OT consults as needed  - Advance activity as appropriate  - Communicate ordered activity level and limitations with patient/family  Outcome: Progressing  Goal: Maintain proper alignment of af ability and stability  - Promote increasing activity/tolerance for mobility and gait  - Educate and engage patient/family in tolerated activity level and precautions    Outcome: Progressing     Problem: Impaired Activities of Daily Living  Goal: Achieve hi

## 2021-02-04 NOTE — PLAN OF CARE
Covid +: 01/12/21  Symptom Onset: 01/10/21  Tmax: Afebrile  O2: 5L @ 96%, wean as tolerated    Monitor Labs,downtrending, will stop monitoring  LDH: 266 -> 232 -> 259 -> 247 -> 300 -> 300 -> 283  Ferritin: 590 -> 686 -> 806 -> 731 -> 564 -> 412 -> 274  CRP

## 2021-02-04 NOTE — DIETARY NOTE
Nutrition Brief Note-    Brief Nutrition Note:  Patient screened at no nutritional risk at admission by RN. Chart Review completed by RD due to COVID19+ and suspected poor PO intake. Intake %.     Oral nutrition supplements (ONS) initiated due to inc 50 %    02/04/21 0980  70 %             Will continue to follow up.      Willa Bolivar, [address], [address] [place] [address]   Clinical Dietitian  [phone]

## 2021-02-04 NOTE — PROGRESS NOTES
[facility] - [facility]    Progress Note    Phoenix Landers Patient Status:  Inpatient    1930 MRN [id number]   Location [facility] 5SW/SE Attending Juan Ayala MD   [facility] Day # 18 PCP Edda Govea MD       Subjective:   Araceli Handler Daily   • aspirin  81 mg Oral Daily   • Vitamin B-12  1,000 mcg Oral Daily   • Galantamine Hydrobromide  12 mg Oral BID with meals       Current PRN Inpatient Meds:      haloperidol lactate, sodium chloride 0.9%, acetaminophen, melatonin, PEG 3350, magnesi Dictated by (CST): Reilly Harper MD on 2/03/2021 at 10:47 AM     Finalized by (CST): Reilly Harper MD on 2/03/2021 at 10:49 AM              Assessment and Plan:     COVID 19 Pneumonia   · Symptom onset 1/10.  Tested + 1/12  · Decadron completed 12 days

## 2021-02-04 NOTE — PLAN OF CARE
Pt denies pain, shorten of breath, chills, fever, nausea, vomiting. Pt is looking better. Pt desaturates ambulating. Medication reviewed. Vital signs monitored. Safety maintained. Call light in reach. We will continue to monitor.   Problem: Patient Centered pain with activity and pre-medicate as appropriate  Outcome: Progressing     Problem: RISK FOR INFECTION - ADULT  Goal: Absence of fever/infection during anticipated neutropenic period  Description: INTERVENTIONS  - Monitor WBC  - monitor vital signs  -ant stability  Description: INTERVENTIONS:  - Monitor vital signs, rhythm, and trends  - Monitor for bleeding, hypotension and signs of decreased cardiac output  - Evaluate effectiveness of vasoactive medications to optimize hemodynamic stability  - Monitor ar equipment as needed  - Ensure adequate protection for wounds/incisions during mobilization  - Obtain PT/OT consults as needed  - Advance activity as appropriate  - Communicate ordered activity level and limitations with patient/family  Outcome: Progressing Interventions:  - Assess patient's functional ability and stability  - Promote increasing activity/tolerance for mobility and gait  - Educate and engage patient/family in tolerated activity level and precautions    Outcome: Progressing     Problem: Impaire

## 2021-02-04 NOTE — PHYSICAL THERAPY NOTE
PHYSICAL THERAPY TREATMENT NOTE - INPATIENT     Room Number: 091/484-L       Presenting Problem: +COVID (1/12/21)    Problem List  Principal Problem:    COVID-19 virus infection  Active Problems:    Hypoxia    Recurrent seizures ([facility] [address]    Counseling regardi RECOMMENDATIONS  PT Discharge Recommendations: 24 hour care/supervision;Sub-acute rehabilitation     PLAN  PT Treatment Plan: Bed mobility; Body mechanics; Endurance; Energy conservation;Patient education;Gait training;Neuromuscular re-educate;Strengthening;S Little   -   Climbing 3-5 steps with a railing?: A Lot     AM-PAC Score:  Raw Score: 17   Approx Degree of Impairment: 50.57%   Standardized Score (AM-PAC Scale): 42.13   CMS Modifier (G-Code): CK    FUNCTIONAL ABILITY STATUS  Gait Assessment   Gait Assist

## 2021-02-04 NOTE — OCCUPATIONAL THERAPY NOTE
OCCUPATIONAL THERAPY TREATMENT NOTE - INPATIENT        Room Number: 190/639-N                Problem List  Principal Problem:    BDOCS-55 virus infection  Active Problems:    Hypoxia    Recurrent seizures ([facility] [address]    Counseling regarding advance care planning BEARING RESTRICTION  Weight Bearing Restriction: None                PAIN ASSESSMENT  Ratin  Location: pain in back        ACTIVITY TOLERANCE  See above    ACTIVITIES OF DAILY LIVING ASSESSMENT  AM-PAC ‘6-Clicks’ Inpatient Daily Activity Short Form  Ho

## 2021-02-04 NOTE — PLAN OF CARE
A/Ox2: confused. Fall risk precautions appropriate for pt: bed in lowest position, call light within reach, non-skid socks. Alarm parameters appropriate for pt: bed/chair alarm on, camera in place. O2 walk completed.   Currently on 7L HFNC: O2 sats >90% MD/LIP if interventions unsuccessful or patient reports new pain  - Anticipate increased pain with activity and pre-medicate as appropriate  Outcome: Progressing     Problem: RISK FOR INFECTION - ADULT  Goal: Absence of fever/infection during anticipated n Problem: CARDIOVASCULAR - ADULT  Goal: Maintains optimal cardiac output and hemodynamic stability  Description: INTERVENTIONS:  - Monitor vital signs, rhythm, and trends  - Monitor for bleeding, hypotension and signs of decreased cardiac output  - Evalua w/o assistive devices  - Assist with transfers and ambulation using safe patient handling equipment as needed  - Ensure adequate protection for wounds/incisions during mobilization  - Obtain PT/OT consults as needed  - Advance activity as appropriate  - Co Functional Mobility  Goal: Achieve highest/safest level of mobility/gait  Description: Interventions:  - Assess patient's functional ability and stability  - Promote increasing activity/tolerance for mobility and gait  - Educate and engage patient/family i

## 2021-02-04 NOTE — PROGRESS NOTES
O2 walk:    Pt's SPO2 % on 4L/min at rest  92%  Pt's SPO2% on 4L/min during ambulation/with exertion is  79%  Pt's SPO2 % on  room air during ambulation/with exertion is 66%  Pt's SPO2 % on room air at rest is76 %    Pt tolerated  O2 walk fairly well.  No

## 2021-02-05 NOTE — PROGRESS NOTES
Pulmonary/Critical Care Follow Up Note    HPI:   Coral Silveira is a 80year old female with Patient presents with:  Seizure Disorder  Difficulty Breathing      PCP Estefany Paige MD  Admission Attending Pola Willson MD    Hospital Day #18    No distres 5 MG/ML injection 1 mg, 1 mg, Intravenous, Q6H PRN  •  Albuterol Sulfate  (90 Base) MCG/ACT inhaler 2 puff, 2 puff, Inhalation, QID  •  sodium chloride 0.9% IV bolus 500 mL, 500 mL, Intravenous, PRN  •  Enoxaparin Sodium (LOVENOX) 40 MG/0.4ML inject edema        LABS:    Lab Results   Component Value Date    WBC 6.8 02/04/2021    HGB 13.6 02/04/2021    HCT 41.7 02/04/2021    .0 02/04/2021    CREATSERUM 0.50 02/04/2021    BUN 16 02/04/2021     02/04/2021    K 4.6 02/04/2021     02/04

## 2021-02-05 NOTE — OCCUPATIONAL THERAPY NOTE
OCCUPATIONAL THERAPY TREATMENT NOTE - INPATIENT        Room Number: 573/047-S      Problem List  Principal Problem:    IALTG-74 virus infection  Active Problems:    Hypoxia    Recurrent seizures ([facility] [address]    Counseling regarding advance care planning and goals 86  Ambulation oxygen flow (liters per minute): 5    ACTIVITIES OF DAILY LIVING ASSESSMENT  AM-PAC ‘6-Clicks’ Inpatient Daily Activity Short Form  How much help from another person does the patient currently need…  -   Putting on and taking off regular low

## 2021-02-05 NOTE — PROGRESS NOTES
Pulmonary/Critical Care Follow Up Note    HPI:   Phoenix Landers is a 80year old female with Patient presents with:  Seizure Disorder  Difficulty Breathing      PCP Edda Govea MD  Admission Attending Hang Rand MD    Hospital Day #19    No distres 5 MG/ML injection 1 mg, 1 mg, Intravenous, Q6H PRN  •  Albuterol Sulfate  (90 Base) MCG/ACT inhaler 2 puff, 2 puff, Inhalation, QID  •  sodium chloride 0.9% IV bolus 500 mL, 500 mL, Intravenous, PRN  •  Enoxaparin Sodium (LOVENOX) 40 MG/0.4ML inject edema        LABS:    Lab Results   Component Value Date    WBC 4.6 02/05/2021    HGB 12.9 02/05/2021    HCT 40.5 02/05/2021    .0 02/05/2021    CREATSERUM 0.46 02/05/2021    BUN 18 02/05/2021     02/05/2021    K 4.3 02/05/2021     02/05

## 2021-02-05 NOTE — PLAN OF CARE
Problem: PAIN - ADULT  Goal: Verbalizes/displays adequate comfort level or patient's stated pain goal  Description: INTERVENTIONS:  - Encourage pt to monitor pain and request assistance  - Assess pain using appropriate pain scale  - Administer analgesics Therapy support as indicated  - Manage/alleviate anxiety  - Monitor for signs/symptoms of CO2 retention  Outcome: Progressing     Problem: MUSCULOSKELETAL - ADULT  Goal: Return mobility to safest level of function  Description: INTERVENTIONS:  - Assess pat

## 2021-02-05 NOTE — PLAN OF CARE
A/Ox2: confused. Fall risk precautions appropriate for pt: bed in lowest position, call light within reach, non-skid socks. Alarm parameters appropriate for pt: bed/chair alarm on, camera in place. Currently on 5L HFNC: O2 sats >90%.  Dyspnea with exertio Encourage pt to monitor pain and request assistance  - Assess pain using appropriate pain scale  - Administer analgesics based on type and severity of pain and evaluate response  - Implement non-pharmacological measures as appropriate and evaluate response INTERVENTIONS:  - Identify barriers to discharge w/pt and caregiver  - Include patient/family/discharge partner in discharge planning  - Arrange for needed discharge resources and transportation as appropriate  - Identify discharge learning needs (meds, wo arrhythmias  - Monitor electrolytes and administer replacement therapy as ordered  2/5/2021 1211 by Vashti Solomon RN  Outcome: Adequate for Discharge  2/5/2021 1136 by Vashti Solomon RN  Outcome: Progressing     Problem: RESPIRATORY - ADULT  Goal: A (e.g. spinal or hip dislocation precautions)  2/5/2021 1211 by Binta Calderon, RN  Outcome: Adequate for Discharge  2/5/2021 1136 by Binta Calderon, RN  Outcome: Progressing     Problem: NEUROLOGICAL - ADULT  Goal: Achieves stable or improved neurologi Promote increasing activity/tolerance for mobility and gait  - Educate and engage patient/family in tolerated activity level and precautions    2/5/2021 1211 by Bernell Dance, RN  Outcome: Adequate for Discharge  2/5/2021 1136 by Bernell Dance, RN  O

## 2021-02-05 NOTE — PROGRESS NOTES
Discharge RN Summary: Patient has discharge order in. Patient to discharge home with [facility]. IV removed by primary RN. Understands to follow up with PCP in 1 week. Dtr understands to  albuterol sulfate with printed prescription.  Home pulse oximeter giv

## 2021-02-05 NOTE — PHYSICAL THERAPY NOTE
PHYSICAL THERAPY TREATMENT NOTE - INPATIENT     Room Number: 771/120-T       Presenting Problem: +COVID (1/12/21)    Problem List  Principal Problem:    COVID-19 virus infection  Active Problems:    Hypoxia    Recurrent seizures ([facility] [address]    Counseling regardi care/supervision;Sub-acute rehabilitation     PLAN  PT Treatment Plan: Bed mobility; Body mechanics; Endurance; Energy conservation;Patient education;Gait training;Neuromuscular re-educate;Strengthening;Stoop training;Stair training;Transfer training;Balance flexed posture)  Stoop/Curb Assistance: Not tested  Comment : -    THERAPEUTIC EXERCISES  Lower Extremity Ankle pumps     Position Sitting       Patient End of Session: Up in chair;Needs met;Call light within reach; Alarm set    CURRENT GOALS   Goals to be

## 2021-02-05 NOTE — DISCHARGE SUMMARY
[facility]    Discharge Summary    Rachael Wilson Patient Status:  Inpatient    1930 MRN [id number]   Location [facility] 5SW/SE Attending Thais Mendoza MD   [facility] Day # 23 PCP Augustine Rodriguez MD     Date of Admission:  for discharge. Discharge Physical Exam:   Physical Exam:    General: No acute distress. Respiratory: Clear to auscultation bilaterally. No wheezes. No rhonchi. Cardiovascular: S1, S2. Regular rate and rhythm. No murmurs, rubs or gallops.    Abdomen: Soln  Inhale 2 puffs into the lungs 4 (four) times daily. Home Meds - Unchanged    Probiotic Product (PROBIOTIC ADVANCED) Oral Cap  Take by mouth.     Galantamine Hydrobromide ER 24 MG Oral Capsule SR 24 Hr  TAKE 1 CAPSULE BY MOUTH EVERY DAY WITH BREAK Advanced Caps      Take by mouth.    Quantity:    Refills: 0     Turmeric 500 MG Caps      One daily   Refills: 0           Where to Get Your Medications      Please  your prescriptions at the location directed by your doctor or nurse    Bring a kira

## 2021-02-08 NOTE — PROGRESS NOTES
Patient is scheduled for virtual visit w/PCP today. We will contact patient for day 1 Covid home monitoring on 2/9/21.

## 2021-02-09 NOTE — PROGRESS NOTES
Contacted patients daughter Megan Casanova for day 1 of Covid home monitoring. She was unable to talk at the moment but will call back.

## 2021-02-10 NOTE — PROGRESS NOTES
LMTCB for day 1 home monitoring. NCM contact information provided. Today is day 2 of attempting to contact patient.

## 2021-02-10 NOTE — TELEPHONE ENCOUNTER
Verbal order given to [facility] for COVID antibody request, kael'd by Dr. Erika Mazariegos.

## 2021-02-11 NOTE — PROGRESS NOTES
MUNIR for day 1 home monitoring. NCM contact information provided. Patient has a virtual visit w/PCP on 2/15/21. Multiple attempts to reach patient for condition update regarding COVID+, with no answer or return call.  Please advise if you'd like us to c

## 2021-02-15 PROBLEM — J12.82 PNEUMONIA DUE TO COVID-19 VIRUS: Status: ACTIVE | Noted: 2021-01-01

## 2021-02-15 PROBLEM — U07.1 PNEUMONIA DUE TO COVID-19 VIRUS: Status: ACTIVE | Noted: 2021-01-01

## 2021-02-15 PROBLEM — J96.21 ACUTE ON CHRONIC RESPIRATORY FAILURE WITH HYPOXIA (HCC): Status: ACTIVE | Noted: 2021-01-01

## 2021-02-15 NOTE — CM/SW NOTE
Spoke to the pt at the bedside. Pt is short of breathing with conversation. She is on 5L/NC and her sats are 89-92%. The pt lives alone and her daughter comes to her home to help. Her sats were low at home today per PT 88-92% on 5LNC.  When she got up her s

## 2021-02-15 NOTE — PROGRESS NOTES
Virtual Telephone Check-In due to [address] Kami Otto verbally consents to a Virtual/Telephone Check-In visit on 02/15/21. Patient has been referred to the [facility] website at [email or web address] to review the yearly Consent to Treat document.

## 2021-02-15 NOTE — ED INITIAL ASSESSMENT (HPI)
Shortness of breath, covid positive. From home. 84% on baseline 5Lnc upon ems arrival. 92% on 5L on arrival to ED. Mild tachypnea at rest upon arrival, not in distress. Denies chest pain.

## 2021-02-16 NOTE — PROGRESS NOTES
[place] [facility] - [facility]  Progress Note     Vanessa Carrillo  : 1930    Status: Observation  Day #: 0    Attending: Yuan Peralta MD  PCP: Sujata Martinez MD      Assessment and Plan     COVID-19 pneumonia  Hypoxemia  -recently hospitalized for COVID-19 --  107 107   CO2  --  29.0 31.0   GLU  --  98 121*   BILT  --  0.6  --    AST  --  21  --    ALT  --  23  --    ALKPHO  --  83  --    TP  --  6.3*  --    DDIMER 0.64  --   --    CRP  --  0.88*  --        Xr Chest Ap Portable  (cpt=71045)    Result Date:

## 2021-02-16 NOTE — H&P
[facility]    PATIENT'S NAME: Raquel Bautista   ATTENDING PHYSICIAN: Criselda Moreland MD   PATIENT ACCOUNT#:   [id number]    LOCATION:  [place] [address]  MEDICAL RECORD #:   [id number]       YOB: 1930  ADMISSION DATE:       02 on physical activity. Cough is nonproductive of phlegm. No fever or chills. Other 12-point review of systems negative. PHYSICAL EXAMINATION:    GENERAL:  Alert, oriented to time, place, and person. Moderate distress. VITAL SIGNS:  Temperature 97.

## 2021-02-16 NOTE — SPIRITUAL CARE NOTE
received a call from daughter in law PETRA ABREU requesting an anointing of the sick for patient. This  called the room and spoke with the patient's daughter to inform pt that request was submitted.   Daughter said pt was \"hanging in there\"

## 2021-02-16 NOTE — ED NOTES
Orders for admission, patient is aware of plan and ready to go upstairs. Any questions, please call ED EDDIE Pinzon  at extension [phone].    Type of COVID test sent:none  COVID Suspicion level: High/positive on 1/12    Titratable drug(s) infusing:none  Rate:na

## 2021-02-16 NOTE — ED PROVIDER NOTES
Patient Seen in: [facility] AND [facility] Emergency Department    History   Patient presents with:  Difficulty Breathing      HPI    Patient presents from home with increased shortness of breath after discharge from the hospital 10 days ago after a prolonged st • HIP PINNING Left 9/8/2018    Performed by Larry Avendano MD at [facility] OR   • HIP SURGERY  8/2015    right   • HIP SURGERY Left 09/2018    [facility]   • HUMERUS FRACTURE SURGERY  8/2015    right   • IR VERTEBROPLASTY  11/10/2017    T12 by    • CARLOS ALBERTO droplet mask on my arrival to the room.  Personal protective equipment including droplet mask, eye protection, and gloves were worn throughout the duration of the exam.  Handwashing was performed prior to and after the exam.  Stethoscope and any equipment u WITH PLATELET.                   Procedure                               Abnormality         Status                                     ---------                               -----------         ------                                     CBC W/ DIFFERENTIA The patient already has does not have any pertinent problems on file. to contribute to the complexity of this ED evaluation.     ED Course: Patient presents to the ED with worsened shortness of breath after recent discharge from hospital after stay for Covi

## 2021-02-16 NOTE — SLP NOTE
ADULT SWALLOWING EVALUATION    ASSESSMENT    ASSESSMENT/OVERALL IMPRESSION:  PPE REQUIRED. THIS SLP WORE GOWN, GLOVES, AND DROPLET MASK. HANDS SANITIZED/WASHED UPON ENTRANCE/EXIT. SLP BSSE orders received and acknowledged.  A swallow evaluation warrante OVERT CSA     Compensatory Strategies Recommended: No straws; Slow rate;Small bites and sips; Alternate consistencies  Aspiration Precautions: Upright position; Slow rate;Small bites and sips; No straw  Medication Administration Recommendations: Crushed in pur Goals: Assess for safest and least restrictive diet consistencies     SWALLOWING HISTORY  Current Diet Consistency: NPO  Dysphagia History: Hx chopped/nectar thickened liquids   Imaging Results:   CXR   =====  CONCLUSION:   1.  Findings of viral pneumonia d strategies as outlined by  BSSE (clinical evaluation) including Slow rate, Small bites, Small sips, No straws, Upright 90 degrees, Upright 90 degrees 30 mins after meal, Feed patient, Supervision with meals with 1:1 assistance 100 % of the time across 2-3

## 2021-02-16 NOTE — PROGRESS NOTES
Patient arrived from ED @0101 on oxygen 6L/min per nasal cannula. She was accompanied by patient transport and her daughter Stiven Leon at time of arrival to unit. Vitals taken shortly after arrival. She was noted to have oxygen saturations in the low 80's.  Resp

## 2021-02-16 NOTE — PLAN OF CARE
Problem: Patient Centered Care  Goal: Patient preferences are identified and integrated in the patient's plan of care  Description: Interventions:  - What would you like us to know as we care for you?   - Provide timely, complete, and accurate informatio should be alert and upright for all feedings (90 degrees preferred)  - Offer food and liquids at a slow rate  - No straws  - Encourage small bites of food and small sips of liquid  - Offer pills one at a time, crush or deliver with applesauce as needed  - optimal ventilation and oxygenation  Description: INTERVENTIONS:  - Assess for changes in respiratory status  - Assess for changes in mentation and behavior  - Position to facilitate oxygenation and minimize respiratory effort  - Oxygen supplementation bas

## 2021-02-16 NOTE — CM/SW NOTE
Cm f/u on ref sent by ED CM. Pt is now requiring 10L 02. Per aidin pt has accepting facilities. However, pt is not ready for dc. Deadline extended on ref.  DON previously requested by ED CM    1115  CM met w/ pt and dtr at the bedside to discuss dc

## 2021-02-17 NOTE — PLAN OF CARE
Patient on HFNC, desats with activity. Aspiration and fall precautions in place. Purewick changed and in place. No complaints of pain. Daughter at bedside. Call light within reach, bed in lowest position, and safety measures in place.      Problem: Patient as needed  - Assess and instruct to report SOB or any respiratory difficulty  - Respiratory Therapy support as indicated  - Manage/alleviate anxiety  - Monitor for signs/symptoms of CO2 retention  Outcome: Progressing     Problem: SKIN/TISSUE INTEGRITY - A pathologist) if coughing or persistent throat clearing or wet/gurgly vocal quality is noted  Outcome: Progressing     Problem: RISK FOR INFECTION - ADULT  Goal: Absence of fever/infection during anticipated neutropenic period  Description: INTERVENTIONS  -

## 2021-02-17 NOTE — SLP NOTE
SPEECH DAILY NOTE - INPATIENT    ASSESSMENT & PLAN   ASSESSMENT  PPE REQUIRED. THIS SLP WORE GOWN, GLOVES, AND DROPLET MASK. HANDS SANITIZED/WASHED UPON ENTRANCE/EXIT. SLP f/u for ongoing diet tolerance.  RN reports pt tolerates diet and medication well 2/17/2021 at 1:59 PM                Diet Recommendations - Solids: Mechanical soft chopped  Diet Recommendations - Liquid: Nectar thick   1:1 FEEDING ASSISTANCE   FEED WHEN FULLY AWAKE/ALERT  STOP ANY ORAL FEEDING IF ANY OVERT CSA     Compensatory Strategi to wait a day or two prior to VFSS. Carey Michael states, Krista Dumont are going to start some steroids, so lets see what that does. Can we wait? \" SLP to f/u with patient/family/RN prior to scheduling VFSS. VFSS procedure reviewed with Carey Michael.  Carey Michael grateful that the te

## 2021-02-17 NOTE — PLAN OF CARE
Problem: Patient Centered Care  Goal: Patient preferences are identified and integrated in the patient's plan of care  Description: Interventions:  - What would you like us to know as we care for you?   - Provide timely, complete, and accurate informatio ADULT  Goal: Skin integrity remains intact  Description: INTERVENTIONS  - Assess and document risk factors for pressure ulcer development  - Assess and document skin integrity  - Monitor for areas of redness and/or skin breakdown  - Initiate interventions, ordered  - Implement neutropenic guidelines  Outcome: Progressing     Problem: SAFETY ADULT - FALL  Goal: Free from fall injury  Description: INTERVENTIONS:  - Assess pt frequently for physical needs  - Identify cognitive and physical deficits and behavior

## 2021-02-17 NOTE — PROGRESS NOTES
[facility] HOSP - [facility]    Progress Note    Mallory Swain Patient Status:  Inpatient    1930 MRN [id number]   Location [facility] 5SW/SE Attending Cassie Torres MD   Hosp Day # 0 PCP Guillermo Holbrook MD       Subjective:   Siena Martinez Fab Escoto MD on 2/15/2021 at 5:13 PM     Finalized by (CST): Iris Hogan MD on 2/15/2021 at 5:16 PM          Ekg 12-lead    Result Date: 2/15/2021  ECG Report  Interpretation  -------------------------- Sinus Rhythm -First degree A-V block - occasiona

## 2021-02-18 NOTE — PROGRESS NOTES
[facility]    Progress Note    Poppy Garcia Patient Status:  Inpatient    1930 MRN [id number]   Location [facility] 5SW/SE Attending Randee Read MD   Hosp Day # 1 PCP Isabelle Ansari MD       Subjective:   Trenton Reid 01/13/2021). Dictated by (CST): Annmarie Braga MD on 2/17/2021 at 1:54 PM     Finalized by (CST): Annmarie Braga MD on 2/17/2021 at 1:59 PM          Xr Video Swallow (cpt=74230)    Result Date: 2/18/2021  CONCLUSION: Normal examination.      Dictated by (C

## 2021-02-18 NOTE — PLAN OF CARE
Patient on 10L HFNC. Desaturates with repositioning and coughing. Robitussin given PRN for cough. Swallow study done today. On thin liquids. Patients daughter, Carey Michael, at bedside. Purewick changed and in place.  Bed in lowest position and safety measures in INTERVENTIONS:  - Assess patient stability and activity tolerance for standing, transferring and ambulating w/ or w/o assistive devices  - Assist with transfers and ambulation using safe patient handling equipment as needed  - Ensure adequate protection fo safety including physical limitations  - Instruct pt to call for assistance with activity based on assessment  - Modify environment to reduce risk of injury  - Provide assistive devices as appropriate  - Consider OT/PT consult to assist with strengthening/

## 2021-02-18 NOTE — SLP NOTE
ADULT VIDEOFLUOROSCOPIC SWALLOWING STUDY    Admission Date: 2/15/2021  Evaluation Date: 02/18/21  Radiologist: Dr. Veena Slaughter    PPE REQUIRED. THIS SLP WORE GLOVES, AND DROPLET MASK. HANDS SANITIZED/WASHED UPON ENTRANCE/EXIT.     PLAN: To f/u x2 sessions/rosa • OP (osteoporosis)    • Pelvic fracture (HCC)    • Pneumonia due to COVID-19 virus    • Post herpetic neuralgia     left scapula   • Prediabetes    • Right humeral fracture 08/2015    OR   • Rotator cuff tear, right 1/2010    OR   • Temporal lobe seizur Limits  Triggered at: Valleculae  Premature Spillage to: Valleculae  Residue Severity, Location: Trace;Mild;Valleculae  Cleared/Reduced with: Secondary swallow  Laryngeal Penetration: None  Tracheal Aspiration: None  Cough/Throat Clear Response: No  Strate compensatory strategies as outlined by  VFSS including Slow rate, Small bites, Small sips, Multiple swallows, Alternate liquids/solids, No straws, Upright 90 degrees with PRN feeding  assistance 95 % of the time across 2 sessions.   In Progress     EDUCATIO

## 2021-02-18 NOTE — PLAN OF CARE
Pt on 10L HFNC. Still desaturating with repositioning and coughing. Robitussin given. Daughter at bedside. Aspiration and fall precautions in place. Vitals currently stable at this time. Plan for video swallow. Will continue to monitor.      Problem: Vika suctioning and perform as needed  - Assess and instruct to report SOB or any respiratory difficulty  - Respiratory Therapy support as indicated  - Manage/alleviate anxiety  - Monitor for signs/symptoms of CO2 retention  Outcome: Progressing     Problem: SK persistent throat clearing or wet/gurgly vocal quality is noted  Outcome: Progressing     Problem: RISK FOR INFECTION - ADULT  Goal: Absence of fever/infection during anticipated neutropenic period  Description: INTERVENTIONS  - Monitor WBC  - Administer g

## 2021-02-19 NOTE — RESPIRATORY THERAPY NOTE
RESPIRATORY THERAPY PATIENT TRANSPORT NOTE    Transported from: 26  Transported to: 201    Patient requiring bi-level support?: NO  Bi-level support used during transport: NO  Oxygen utilized during transport?  YES  NRB MASK       RCP comments:  Pt verenice

## 2021-02-19 NOTE — SLP NOTE
SPEECH DAILY NOTE - INPATIENT    ASSESSMENT & PLAN   ASSESSMENT  PT RECENT COVID-19 POSITIVE. CONTACT AND DROPLET ISOLATION PPE REQUIRED. THIS THERAPIST WORE GOWN, GLOVES, FACE SHIELD, AND DROPLET MASK. HANDS SANITIZED/WASHED UPON ENTRANCE/EXIT.      SLP f/ Kalia White MD on 2/17/2021 at 1:59 PM            Diet Recommendations - Solids: Mechanical soft Ground  Diet Recommendations - Liquid: Nectar thick    Compensatory Strategies Recommended: No straws; Alternate consistencies  Aspiration Precautions: Gemini Guzman In Progress     FOLLOW UP  Follow Up Needed: Yes  SLP Follow-up Date: 02/22/21  Number of Visits to Meet Established Goals: 2    Session: 1 following VFSS    If you have any questions, please contact Alison Funez

## 2021-02-19 NOTE — PROGRESS NOTES
[facility]    Progress Note    Ezequiel Hilliard Patient Status:  Inpatient    1930 MRN [id number]   Location [facility] 5SW/SE Attending Coco Warren MD   Hosp Day # 2 PCP Florentino Orozco MD       Subjective:   Noé Chiang patient (initial diagnosis 01/13/2021).     Dictated by (CST): Dejan Chao MD on 2/17/2021 at 1:54 PM     Finalized by (CST): Dejan Chao MD on 2/17/2021 at 1:59 PM          Xr Video Swallow (cpt=[id number])    Result Date: 2/18/2021  CONCLUSION: Normal exam pneumonia  Hypoxemia  -recently hospitalized for COVID-19 pneumonia. Was sent home on O2. Presents with SOB and worsening hypoxemia. -inflammatory markers ok.  D-dimer normal  -procalcitonin normal  - CT of the chest pending to rule out PE  - venous doppl

## 2021-02-19 NOTE — CDS QUERY
.How to Answer this Query    1.) Click \"Edit\" button on the toolbar.  2.) Type an \"X\" in the bracket for the diagnosis that applies. (You may also add additional clinical details as you feel necessary to substantiate your response).    3.) Finally click clear other than COVID  * Per Dr. Libra Joy, patient now on vapotherm. Transferred to PCU for      worsening hypoxia.         If you have any questions, please contact Clinical :  Fredo Simno RN at [phone] / [phone]     Thank Jitendra Willson

## 2021-02-19 NOTE — PLAN OF CARE
Pt on 10-13L HFNC throughout night. Desaturating with coughing and repositioning in the bed. Frequent rounding. Fall risk and seizure precautions in place. Will continue to monitor. 0630: Pt currently on vapotherm.      Problem: Patient Centered Care  G activity as appropriate  - Communicate ordered activity level and limitations with patient/family  Outcome: Progressing     Problem: Impaired Functional Mobility  Goal: Achieve highest/safest level of mobility/gait  Description: Interventions:  - Assess pa Patient's Short Term Goal: Lower Oxygen level to baseline    Interventions:   - Wean O2 as tolerated  - follow plan of care  - follow medication regimen   - See additional Care Plan goals for specific interventions  Outcome: Not Progressing     Problem: RE

## 2021-02-19 NOTE — CONSULTS
Pulmonary/Critical Care Consultation Note    HPI:   Jessy Cardenas is a 80year old female with Patient presents with:  Difficulty Breathing    Tanesha Sanchez MD    Pt is a [address] yo with dementia, CAD, falls, HL, Sz d/o and other med problems including long a resolved   • Vitamin B 12 deficiency    • Vitamin D deficiency          PSH:  Past Surgical History:   Procedure Laterality Date   • ANGIOPLASTY (CORONARY)  1995    stent placement, 90% blockage   • COLONOSCOPY  2002   • D & C  approx 27 yrs old    missed Smoking status: Former Smoker        Types: Cigarettes        Quit date: 1984        Years since quittin.2      Smokeless tobacco: Never Used      Tobacco comment: quit smoking in     Substance and Sexual Activity      Alcohol use:  Yes from my perspective    Dementia  Plan supportive care    DVT px  Lovenox    EOL  Started discussion with DTR  We previously discussed how severe viral infections in elderly pt with multiple health challenges softer have bad outcomes  I asked her to think a

## 2021-02-19 NOTE — PROGRESS NOTES
Pulmonary/Critical Care Follow Up Note    HPI:   Mahi Gary is a 80year old female with Patient presents with:  Difficulty Breathing      PCP Fareed Beebe MD  Admission Attending Cornelius Reid MD    Hospital Day #2    Hx limited by dementia  + sob Nebulization, QID  •  ipratropium-albuterol (DUONEB) nebulizer solution 3 mL, 3 mL, Nebulization, Q4H PRN  •  Heparin Sodium (Porcine) 5000 UNIT/ML injection 5,000 Units, 5,000 Units, Subcutaneous, Q12H SALUD  •  acetaminophen (TYLENOL) tab 650 mg, 650 mg, O perspective     Dementia  Plan supportive care     DVT px  Lovenox     EOL  Continued discussion of CPR and end of life wishes  Discussed the risks and benefits of CPR  DTR notes they are \"Yazidism\" and that she thinks mom would want to die at home with

## 2021-02-19 NOTE — PLAN OF CARE
Problem: Patient Centered Care  Goal: Patient preferences are identified and integrated in the patient's plan of care  Description: Interventions:  - What would you like us to know as we care for you?  Previously here with COVID in January.   - Provide ti Problem: SKIN/TISSUE INTEGRITY - ADULT  Goal: Skin integrity remains intact  Description: INTERVENTIONS  - Assess and document risk factors for pressure ulcer development  - Assess and document skin integrity  - Monitor for areas of redness and/or skin b period  Description: INTERVENTIONS  - Monitor WBC  - Administer growth factors as ordered  - Implement neutropenic guidelines  Outcome: Progressing     Problem: SAFETY ADULT - FALL  Goal: Free from fall injury  Description: INTERVENTIONS:  - Assess pt freq

## 2021-02-20 NOTE — PLAN OF CARE
Patient alert but oriented only to self. Patient is very pleasant and does not complain of any pain besides a headahce at the beginning of the shift- PRN Tylenol given. Patient remains on 15 L HFNC saturating in the high 80s most of the shift.  Patient coug effort  - Oxygen supplementation based on oxygen saturation or ABGs  - Provide Smoking Cessation handout, if applicable  - Encourage broncho-pulmonary hygiene including cough, deep breathe, Incentive Spirometry  - Assess the need for suctioning and perform degrees preferred)  - Offer food and liquids at a slow rate  - No straws  - Encourage small bites of food and small sips of liquid  - Offer pills one at a time, crush or deliver with applesauce as needed  - Discontinue feeding and notify MD (or speech path

## 2021-02-20 NOTE — PROGRESS NOTES
[facility]    Progress Note    Livan Beckford Patient Status:  Inpatient    1930 MRN [id number]   Location [facility] 5SW/SE Attending Killian Marcelo MD   Hosp Day # 3 PCP Kristin Barajas MD       Subjective:   Chilo Shah distal right thigh measures 5.0 x 4.2 x 1.5 cm. Differential includes localized hematoma/seroma versus nonspecific cellulitis.   Recommend clinical correlation     Dictated by (CST): Brie Cooper MD on 2/19/2021 at 4:00 PM     Finalized by (CST): Mo (CST): Laly Garner MD on 2/19/2021 at 12:43 PM     Finalized by (CST): Laly Garner MD on 2/19/2021 at 12:56 PM              • MethylPREDNISolone Sodium Succ  40 mg Intravenous Q8H   • Galantamine Hydrobromide  4 mg Oral BID with meals   • asp

## 2021-02-20 NOTE — RESPIRATORY THERAPY NOTE
[facility]   [address]  [place] [address]  Phone: [phone]  Fax: [phone]              Joshua Medrano  1963    Encounter Date: 4/6/2018    Jett Bedoya M.D.          PROGRESS NOTE:  Chief Complaint   Patient presents with   • CHF (Systolic)       Subjective:   Joshua Medrano is a 54 y.o. male who presents today for cardiac care and evaluation and a heart failure clinic after recent hospital stay for heart failure exacerbation. Patient does have a history of ischemic cardiomyopathy for which he underwent CABG x 2 (LIMA to LAD and SVG to OM) in December 2017. His left ventricular systolic function was found to be 25%.     Patient still gets winded with daily living activities and exertion. No symptoms at rest.     Patient was able to complete 146 m during his 6 minute walk test. his O2 saturation at baseline was 94% and at the end of the test, the O2 saturation was 99%. he reported 2 level of dyspnea on Elsa scale.    Past Medical History:   Diagnosis Date   • ASTHMA    • Atrial fibrillation (CMS-[facility])    • CAD (coronary artery disease)    • Chronic obstructive pulmonary disease (CMS-[facility])    • Diabetes      Past Surgical History:   Procedure Laterality Date   • THORACOSCOPY Left 1/25/2018    Procedure: THORACOSCOPY- PLEURODESIS ;  Surgeon: Chandu Paez M.D.;  Location: [facility];  Service: General   • MULTIPLE CORONARY ARTERY BYPASS ENDO VEIN HARVEST  12/22/2017    Procedure: MULTIPLE CORONARY ARTERY BYPASS ENDO VEIN HARVEST X2;  Surgeon: Kiel Ash M.D.;  Location: SURGERY [facility];  Service: Cardiothoracic   • JIM  12/22/2017    Procedure: JIM;  Surgeon: Kiel Ash M.D.;  Location: SURGERY [facility];  Service: Cardiothoracic   • OTHER ABDOMINAL SURGERY       Family History   Problem Relation Age of Onset   • Diabetes Mother    • Stroke Mother    • Leukemia Mother    • Diabetes Father    • No  Patient received  on NC at 15+lpm, was transition to [facility] due to persistent deasat into the low 80s. Currently is on 30L/70%, tolerating well. Known Problems Sister    • Heart Disease Brother      PPM   • Lung Disease Brother    • Alcohol/Drug Brother    • Diabetes Sister      Social History     Social History   • Marital status:      Spouse name: N/A   • Number of children: N/A   • Years of education: N/A     Occupational History   • Not on file.     Social History Main Topics   • Smoking status: Former Smoker     Packs/day: 0.50     Years: 30.00     Types: Cigarettes     Quit date: 12/13/2017   • Smokeless tobacco: Never Used      Comment: 1/2-1.5 packs for 30 years   • Alcohol use No   • Drug use: No   • Sexual activity: Not on file     Other Topics Concern   • Not on file     Social History Narrative   • No narrative on file     Allergies   Allergen Reactions   • Erythromycin Anaphylaxis     Rxn - 1994   • Cillins [Penicillins] Rash     As a child, tolerated cefepime (12/2017), ceftriaxone (1/2018), cefazolin (12/2017)   • Metoprolol      Pt stated got l[place] and aggressive    • Shellfish Allergy Anaphylaxis     Pt stated that he is allergic to all seafood, will develop a rash and says that rash will clear up with benadryl     Outpatient Encounter Prescriptions as of 4/6/2018   Medication Sig Dispense Refill   • carvedilol (COREG) 6.25 MG Tab Take 1 Tab by mouth 2 times a day, with meals. 60 Tab 11   • furosemide (LASIX) 20 MG Tab Take 1 Tab by mouth every day. 30 Tab 11   • lisinopril (PRINIVIL) 10 MG Tab Take 0.5 Tabs by mouth every evening. 30 Tab 1   • warfarin (COUMADIN) 5 MG Tab Take 5-10 mg by mouth every day. 5 mg on Mondays  10 mg all other days     • amiodarone (CORDARONE) 200 MG Tab Take 1 Tab by mouth every day. 90 Tab 3   • digoxin (LANOXIN) 125 MCG Tab Take 1 Tab by mouth every day at 6 PM. 90 Tab 3   • rosuvastatin (CRESTOR) 40 MG tablet Take 1 Tab by mouth every evening. 90 Tab 3   • spironolactone (ALDACTONE) 50 MG Tab Take 1 Tab by mouth every day. 90 Tab 3   • aspirin (ASA) 81 MG Chew Tab chewable tablet Take 1 Tab by mouth  "every day. 90 Tab 3   • ipratropium-albuterol (DUONEB) 0.5-2.5 (3) MG/3ML nebulizer solution 3 mL by Nebulization route every four hours as needed for Shortness of Breath. 30 Bullet 0   • albuterol 108 (90 Base) MCG/ACT Aero Soln inhalation aerosol Inhale 2 Puffs by mouth every 6 hours as needed for Shortness of Breath. 1 Inhaler 10   • predniSONE (DELTASONE) 20 MG Tab Take 1.5 Tabs by mouth every day. 6 Tab 0   • [DISCONTINUED] doxycycline monohydrate (ADOXA) 100 MG tablet Take 1 Tab by mouth every 12 hours. 13 Tab 0   • [DISCONTINUED] ferrous sulfate 325 (65 Fe) MG tablet Take 1 Tab by mouth every morning with breakfast. 30 Tab 3   • [DISCONTINUED] guaiFENesin dextromethorphan (ROBITUSSIN DM) 100-10 MG/5ML Syrup syrup Take 10 mL by mouth every 6 hours as needed for Cough. 840 mL 0   • [DISCONTINUED] furosemide (LASIX) 20 MG Tab Take 20 mg by mouth 2 times a day.     • [DISCONTINUED] carvedilol (COREG) 3.125 MG Tab Take 1 Tab by mouth 2 times a day, with meals. 60 Tab 11     No facility-administered encounter medications on file as of 4/6/2018.      Review of Systems   Constitutional: Negative for diaphoresis and fever.   HENT: Negative for nosebleeds.    Eyes: Negative for blurred vision and double vision.   Respiratory: Positive for shortness of breath. Negative for cough.    Cardiovascular: Negative for chest pain and palpitations.   Gastrointestinal: Negative for abdominal pain.   Genitourinary: Negative for dysuria and frequency.   Musculoskeletal: Negative for falls and myalgias.   Skin: Negative for rash.   Neurological: Negative for dizziness, sensory change and headaches.   Endo/Heme/Allergies: Does not bruise/bleed easily.   Psychiatric/Behavioral: Negative for depression and memory loss.        Objective:   /60   Pulse 70   Ht 1.956 m (6' 5\")   Wt (!) 142 kg (313 lb)   SpO2 96%   BMI 37.12 kg/m²      Physical Exam   Constitutional: He is oriented to person, place, and time. No distress.   "   HENT:   Head: Normocephalic and atraumatic.   Right Ear: External ear normal.   Left Ear: External ear normal.   Eyes: Right eye exhibits no discharge. Left eye exhibits no discharge.   Neck: No JVD present. No thyromegaly present.   Cardiovascular: Normal rate, regular rhythm, normal heart sounds and intact distal pulses.  Exam reveals no gallop and no friction rub.    No murmur heard.  Pulmonary/Chest: Breath sounds normal. No respiratory distress.   Abdominal: Bowel sounds are normal. He exhibits no distension. There is no tenderness.   Musculoskeletal: He exhibits no edema or tenderness.   Neurological: He is alert and oriented to person, place, and time. No cranial nerve deficit.   Skin: Skin is warm and dry. He is not diaphoretic.   Psychiatric: He has a normal mood and affect. His behavior is normal.   Nursing note and vitals reviewed.      Assessment:     1. ACC/AHA stage C systolic heart failure (CMS-[facility])  carvedilol (COREG) 6.25 MG Tab    furosemide (LASIX) 20 MG Tab   2. Heart failure, NYHA class 2 (CMS-[facility])  carvedilol (COREG) 6.25 MG Tab    furosemide (LASIX) 20 MG Tab   3. Ischemic cardiomyopathy  carvedilol (COREG) 6.25 MG Tab   4. Coronary artery disease involving native coronary artery of native heart without angina pectoris     5. Dyslipidemia     6. High risk medication use     7. Dyspnea on exertion     8. Smoking         Medical Decision Making:  Today's Assessment / Status / Plan:   Today, based on physical examination findings, patient is euvolemic. No JVD, lungs are clear to auscultation, no pitting edema in bilateral lower extremities, no ascites.    Dry weight is 313 lbs.    Will increase Carvedilol to 6.25 mg po twice daily.    Change lasix to 20 mg once a day.    Continue Lisinopril at 5 mg po daily and Spironolactone 50 mg po daily.    Will continue to closely monitor for side effects of patient's high risk medication(s) including liver, renal function and electrolytes.    I will see  patient back in our Heart Failure Clinic with lab tests and studies results in 3 weeks.    I thank you Dr. Le for referring patient to our Heart Failure Clinic today.          Nam Le M.D.  [address] [place] [id number]  VIA In Basket

## 2021-02-20 NOTE — PROGRESS NOTES
Pulmonary/Critical Care Follow Up Note    HPI:   Carmela Longo is a 80year old female with Patient presents with:  Difficulty Breathing      PCP Alda Friedman MD  Admission Attending Nathalia Yadav MD    Hospital Day #3    Hx limited by dementia  C/o s nebulizer solution 3 mL, 3 mL, Nebulization, QID  •  ipratropium-albuterol (DUONEB) nebulizer solution 3 mL, 3 mL, Nebulization, Q4H PRN  •  Heparin Sodium (Porcine) 5000 UNIT/ML injection 5,000 Units, 5,000 Units, Subcutaneous, Q12H [facility]  •  acetaminophen IS         Dementia  Plan supportive care     DVT px  heparin     EOL  Readdressed code status with DTR  She spoke to brother and both agree full code  Discussed her clinical problems including worsening hypoxemia  Full code        31 min CCT         César

## 2021-02-20 NOTE — PLAN OF CARE
[facility] was transferred to PCU for increasing oxygen needs. Was on vapotherm upon arrival but transitioned to high flow nasal cannula 15L O2 and tolerating with saturations in high 80's/low 90's. Per MD, maintain oxygen saturations above 86-87%.  Pt is orien INTERVENTIONS:  - Assess for changes in respiratory status  - Assess for changes in mentation and behavior  - Position to facilitate oxygenation and minimize respiratory effort  - Oxygen supplementation based on oxygen saturation or ABGs  - Provide Smoking Swallowing  Goal: Minimize aspiration risk  Description: Interventions:  - Patient should be alert and upright for all feedings (90 degrees preferred)  - Offer food and liquids at a slow rate  - No straws  - Encourage small bites of food and small sips of

## 2021-02-20 NOTE — PLAN OF CARE
Problem: Patient Centered Care  Goal: Patient preferences are identified and integrated in the patient's plan of care  Description: Interventions:  - What would you like us to know as we care for you?  Previously here with COVID in January.   - Provide ti Problem: SKIN/TISSUE INTEGRITY - ADULT  Goal: Skin integrity remains intact  Description: INTERVENTIONS  - Assess and document risk factors for pressure ulcer development  - Assess and document skin integrity  - Monitor for areas of redness and/or skin b Administer growth factors as ordered  - Implement neutropenic guidelines  Outcome: Progressing     Problem: SAFETY ADULT - FALL  Goal: Free from fall injury  Description: INTERVENTIONS:  - Assess pt frequently for physical needs  - Identify cognitive and p

## 2021-02-21 NOTE — RESPIRATORY THERAPY NOTE
Patient remains on [address], Fio2 weaned to 60%. Spo2 within goal sats . Txs administered as scheduled.

## 2021-02-21 NOTE — PLAN OF CARE
Patient alert but confused. Reports \"mild\" headache pain at the beginning of the shift- PRN Tylenol given. Patient remains on Vapotherm 30 L at 70%. PRN Robitussin given for continuous cough.  EKG performed to determine patient's rhythm- SR with rate vari Position to facilitate oxygenation and minimize respiratory effort  - Oxygen supplementation based on oxygen saturation or ABGs  - Provide Smoking Cessation handout, if applicable  - Encourage broncho-pulmonary hygiene including cough, deep breathe, Incent Patient should be alert and upright for all feedings (90 degrees preferred)  - Offer food and liquids at a slow rate  - No straws  - Encourage small bites of food and small sips of liquid  - Offer pills one at a time, crush or deliver with applesauce as ne

## 2021-02-21 NOTE — PROGRESS NOTES
[facility]    Progress Note    Mahi Gary Patient Status:  Inpatient    1930 MRN [id number]   Location [facility] 5SW/SE Attending Leroy He MD   [facility] Day # 4 PCP Fareed Beebe MD       Subjective:   Olean Lennox hematoma/seroma versus nonspecific cellulitis.   Recommend clinical correlation     Dictated by (CST): Deon Leon MD on 2/19/2021 at 4:00 PM     Finalized by (CST): Deon Leon MD on 2/19/2021 at 4:03 PM          Xr Chest Ap Portable  (cpt= infarct -age undetermined.  ABNORMAL When compared with ECG of 02/15/2021 16:05:23 Electronically signed on 02/21/2021 at 13:49 by Myrtle Logan.    • MethylPREDNISolone Sodium Succ  40 mg Intravenous Q8H   • Galantamine Hydrobromide  4 mg Oral BID with

## 2021-02-21 NOTE — PROGRESS NOTES
Pulmonary/Critical Care Follow Up Note    HPI:   Vanessa Carrillo is a 80year old female with Patient presents with:  Difficulty Breathing      PCP Sujata Martinez MD  Admission Attending Shobha Carpenter MD    Hospital Day #4    Hx limited by dementia  Breat ipratropium-albuterol (DUONEB) nebulizer solution 3 mL, 3 mL, Nebulization, QID  •  ipratropium-albuterol (DUONEB) nebulizer solution 3 mL, 3 mL, Nebulization, Q4H PRN  •  Heparin Sodium (Porcine) 5000 UNIT/ML injection 5,000 Units, 5,000 Units, Subcutaneo IS         Dementia  Plan supportive care     DVT px  heparin     EOL  From yesterday:   Readdressed code status with DTR  She spoke to brother and both agree full code  Discussed her clinical problems including worsening hypoxemia  Full code    D/w with

## 2021-02-22 NOTE — PLAN OF CARE
Katlyn Pittman is resting quietly in bed, denies complaints, though shortness of breath, O2 desat to 80s noted after any activity (eating breakfast, cough, PT/OT eval). See PT/OT notes. Continues to necessitate vapotherm use, will attempt to titrate as able.  [address]

## 2021-02-22 NOTE — OCCUPATIONAL THERAPY NOTE
OCCUPATIONAL THERAPY EVALUATION - INPATIENT     Room Number: 201/201-A  Evaluation Date: 2/22/2021  Type of Evaluation: Initial       Physician Order: IP Consult to Occupational Therapy  Reason for Therapy: ADL/IADL Dysfunction and Discharge Planning    OC oral facial hygiene, upper extremity dressing, and self- feeding in supported sitting position provided set-up and cues for safety.     In prep for ADL participation and further OOB activity (with goal for bed<>chair<>commode t/fs as per home), pt completed (coronary artery disease) 1995    s/p stent for 90% blockage   • Closed left hip fracture ([facility] [address] 09/2018    Intertrochanter nail by Jef Puga   • [facility] 08[facility]2015    OR   • Diastolic dysfunction 16/1148    PAP est 40 at the time during ac commode. Per dtr, pt has been working with home health PT , ambulating short distances. Pt has been on 3-4L/min of O2 per dtr.        SUBJECTIVE  Agreeable to activity     OCCUPATIONAL THERAPY EXAMINATION      OBJECTIVE  Precautions: Bed/chair alarm  Fall R transfer with CGA  Comment:     Pt will complete LE dressing with CGA  Comment:     Patient will tolerate standing for 3 minutes in prep for adls with CGA   Comment:     Comment:          Goals  on:  3/8/2021  Frequency: 5x/wk

## 2021-02-22 NOTE — RESPIRATORY THERAPY NOTE
Pt remains on [address] ,30L/FIO2 60%. NEB tx, ezpap and flutter therapy given as ordered. Weaned FIO2 to 55%, Pt maintaining spo2 91% at this time. RT will continue to wean as pt tolerates.

## 2021-02-22 NOTE — PROGRESS NOTES
Pulmonary/Critical Care Follow Up Note    HPI:   Faiza Mcpherson is a 80year old female with Patient presents with:  Difficulty Breathing      PCP Thereasa Sicard, MD  Admission Attending Salo Mckeon MD    Hospital Day #5    Hx limited by dementia  Toni Units, 2,000 Units, Oral, Daily  •  ipratropium-albuterol (DUONEB) nebulizer solution 3 mL, 3 mL, Nebulization, QID  •  ipratropium-albuterol (DUONEB) nebulizer solution 3 mL, 3 mL, Nebulization, Q4H PRN  •  Heparin Sodium (Porcine) 5000 UNIT/ML injection Actemra, RDV, and dex prior admission  CXR better since last admission  Plan        Follow                O2 and wean as tolerated                IS         Dementia  Plan supportive care     DVT px  heparin     EOL  From admission:   Readdressed code stat

## 2021-02-22 NOTE — PLAN OF CARE
Patient alert and oriented only to self. Patient denies pain. Vapotherm remains at the same settings as the start of the shift- tolerating well. Patient has a strong cough- PRN Robitussin x1 given.  Purewick in place and changed x1 during this shift- [place] supplementation based on oxygen saturation or ABGs  - Provide Smoking Cessation handout, if applicable  - Encourage broncho-pulmonary hygiene including cough, deep breathe, Incentive Spirometry  - Assess the need for suctioning and perform as needed  - Ass Offer food and liquids at a slow rate  - No straws  - Encourage small bites of food and small sips of liquid  - Offer pills one at a time, crush or deliver with applesauce as needed  - Discontinue feeding and notify MD (or speech pathologist) if coughing o

## 2021-02-22 NOTE — PHYSICAL THERAPY NOTE
PHYSICAL THERAPY EVALUATION - INPATIENT     Room Number: 201/201-A  Evaluation Date: 2/22/2021  Type of Evaluation: Initial   Physician Order: PT Eval and Treat    Presenting Problem: acute on chronic respiratory failure w hypoxia  Reason for Therapy:  Mob Form.  Research supports that patients with this level of impairment may benefit from sub-acute rehab to optimize functional outcomes. Per dtr, Carey Michael, report, plan for pt to return home with family and have continued 24/7 care/assist and HHPT.     Patient w Post herpetic neuralgia     left scapula   • Prediabetes    • Right humeral fracture 08/2015    OR   • Rotator cuff tear, right 1/2010    OR   • Temporal lobe seizure ([facility] 8/2013    resolved   • Vitamin B 12 deficiency    • Vitamin D deficiency        Pas Not tested    ACTIVITY TOLERANCE  Pulse: 83        BP: 134/76  BP Location: Right leg  BP Method: Automatic  Patient Position: Lying    O2 WALK        SPO2% Ambulation on Oxygen: 84  Ambulation oxygen flow (liters per minute): 30(vapotherm: 30L/min Fio2 60 maintaining SpO2>90% with activity   Goal #3   Current Status    Goal #4 Patient will negotiate one curb w/ assistive device and CG while maintaining SpO2>90% with activity  Simulate stair negotiation in isolation room with 2 high-knee marches achieving >8

## 2021-02-22 NOTE — PROGRESS NOTES
[facility]    Progress Note    Paulette Soler Patient Status:  Inpatient    1930 MRN [id number]   Location [facility] 5SW/SE Attending Juancarlos Hidalgo MD   Hosp Day # 5 PCP Abdelrahman Nowak MD       Subjective:   Chiki Jordan posterior aspect distal right thigh measures 5.0 x 4.2 x 1.5 cm. Differential includes localized hematoma/seroma versus nonspecific cellulitis.   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

## 2021-02-23 NOTE — PROGRESS NOTES
[facility]    Progress Note    Rahat Song Patient Status:  Inpatient    1930 MRN [id number]   Location [facility] 5SW/SE Attending Naida Stevens MD   [facility] Day # 6 PCP Kristin Adkins MD       Subjective:   Zelalem Bodily ipratropium-albuterol  3 mL Nebulization QID   • Heparin Sodium (Porcine)  5,000 Units Subcutaneous Q12H [address]     ipratropium-albuterol, acetaminophen, ondansetron HCl, Metoclopramide HCl, guaiFENesin      Assessment and Plan:      COVID-19 pneumonia  Hypox

## 2021-02-23 NOTE — PHYSICAL THERAPY NOTE
PHYSICAL THERAPY TREATMENT NOTE - INPATIENT     Room Number: 856/808-O       Presenting Problem: acute on chronic respiratory failure w hypoxia    Problem List  Principal Problem:    Acute on chronic respiratory failure with hypoxia (HCC)  Active Problems: training;Strengthening;Stoop training;Stair training;Transfer training;Balance training    SUBJECTIVE  \"I'm sorry\"    OBJECTIVE  Precautions: Bed/chair alarm;Limb alert - left    WEIGHT BEARING RESTRICTION  Weight Bearing Restriction: None Patient is able to demonstrate supine - sit EOB @ level: supervision      Goal #1   Current Status  Min for rolling right<>left   Goal #2 Patient is able to demonstrate transfers Sit to/from Stand at assistance level: CG with walker - rolling      Goal #2

## 2021-02-23 NOTE — PROGRESS NOTES
[facility] - [facility]    Progress Note      Assessment and Plan:   1. 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 Has received full gamut of Covid specific therapeutics. Remains on steroid.     Recommendations:

## 2021-02-23 NOTE — PLAN OF CARE
Dgtr & friend visiting, receptive & involved in care. Pt. Was seen by mds & therapy earlier per report. Comfortable & w/o c/o or distress, kate. Up in chair & watching tv & visiting. Had lunch. Vapotherm now 25%/30L. Cont.  To monitor & maintain comfort & sa

## 2021-02-23 NOTE — RESPIRATORY THERAPY NOTE
Received pt on cont vapotherm with the settings of 30L, 55%. Gradually Weaned the FIO2 to 30% and decrease to 25LPM, pt maintaining approprietly sat's. Pt is tolerating weaning very well and not in any respiratory distress at this time.     @16:04: Weaned p

## 2021-02-24 NOTE — CM/SW NOTE
Care Progression Note:  Active Acute Medical Issue:   Acute on chronic respiratory failure with hypoxia (HCC)   Post Covid 19 infection-patient weaning down on O2 requirements, now on 4L    Other Contributing Medical Factors/Dx. :   Dementia    Length of st

## 2021-02-24 NOTE — SLP NOTE
SPEECH DAILY NOTE - INPATIENT    ASSESSMENT & PLAN   ASSESSMENT  PPE REQUIRED. THIS SLP WORE GOGGLES, GLOVES, AND DROPLET MASK. HANDS SANITIZED/WASHED UPON ENTRANCE/EXIT.        SLP f/u for ongoing POC as patient weaned from [facility] and now tolerating 4L/min O Communication: Discussed with RN  FCM Score: 5   FCM Impression: Abnormal     GOALS  Goal #1 The patient will tolerate chopped consistency and thin  liquids without overt signs or symptoms of aspiration with 100 % accuracy over 2 session(s).   Pt tolerates

## 2021-02-24 NOTE — PLAN OF CARE
Problem: Patient Centered Care  Goal: Patient preferences are identified and integrated in the patient's plan of care  Description: Interventions:  - What would you like us to know as we care for you?  Previously here with COVID in January.   - Provide ti Problem: SKIN/TISSUE INTEGRITY - ADULT  Goal: Skin integrity remains intact  Description: INTERVENTIONS  - Assess and document risk factors for pressure ulcer development  - Assess and document skin integrity  - Monitor for areas of redness and/or skin b Administer growth factors as ordered  - Implement neutropenic guidelines  Outcome: Progressing

## 2021-02-24 NOTE — DIETARY NOTE
Nutrition Re-screen    Pt seen by RD d/t LOS. Pt not at nutrition risk. Pt with fair appetite and PO intake >60%. Pt receiving protein supplement from daughter BID (140 kcal, 14 g pro).  Will add thickened soy milk to breakfast and lunch tray so pt has safe

## 2021-02-24 NOTE — PROGRESS NOTES
[facility]    Progress Note    Sudhir Leung Patient Status:  Inpatient    1930 MRN [id number]   Location [facility] 2W/SW Attending Cira Calderon MD   Hosp Day # 7 PCP Gil Damon MD       Subjective:   Grabiel Zamora ipratropium-albuterol, acetaminophen, ondansetron HCl, Metoclopramide HCl, guaiFENesin    Results:     Recent Labs   Lab 02/21/21  0455 02/22/21  0517 02/23/21  0857 02/24/21  0504   RBC 4.38 4.55 4.58 4.33   HGB 13.1 13.7 13.9 13.1   HCT 40.1 41.3 42.4

## 2021-02-24 NOTE — PLAN OF CARE
Received report at 1900. Patient alert to self only. Family at bedside. No complaints of pain. Patient on 7L high flow, tolerating well. Lung sound diminished, crackles, and rhonchi noted. Medications given per MAR. VS stable.  Will continue to monitor christa Spirometry  - Assess the need for suctioning and perform as needed  - Assess and instruct to report SOB or any respiratory difficulty  - Respiratory Therapy support as indicated  - Manage/alleviate anxiety  - Monitor for signs/symptoms of CO2 retention  Vj Plume speech pathologist) if coughing or persistent throat clearing or wet/gurgly vocal quality is noted  Outcome: Progressing     Problem: RISK FOR INFECTION - ADULT  Goal: Absence of fever/infection during anticipated neutropenic period  Description: INTERVENT

## 2021-02-24 NOTE — OCCUPATIONAL THERAPY NOTE
OCCUPATIONAL THERAPY TREATMENT NOTE - INPATIENT        Room Number: 201/201-A                Problem List  Principal Problem:    Acute on chronic respiratory failure with hypoxia (HCC)  Active Problems:    Pneumonia due to COVID-19 virus      OCCUPATIONAL Restriction: None                PAIN ASSESSMENT  Rating: Unable to rate  Location: (L shoulder)  Management Techniques: Repositioning     ACTIVITY TOLERANCE                         O2 SATURATIONS        SPO2% Ambulation on Oxygen: 84       ACTIVITIES OF D tolerate standing for 3 minutes in prep for adls with CGA   Comment: pt tolerated standing 2 attempts for 1 min with mod support and encouragement      Comment:            Goals  on:  3/8/2021  Frequency: 5x/wk

## 2021-02-24 NOTE — PROGRESS NOTES
Pulmonary/Critical Care Follow Up Note    HPI:   Sagar Farmer is a 80year old female with Patient presents with:  Difficulty Breathing      PCP Kristina Pepe MD  Admission Attending Molly Granados MD    Hospital Day #7    Hx limited by dementia  C/o m Daily  •  ipratropium-albuterol (DUONEB) nebulizer solution 3 mL, 3 mL, Nebulization, QID  •  ipratropium-albuterol (DUONEB) nebulizer solution 3 mL, 3 mL, Nebulization, Q4H PRN  •  Heparin Sodium (Porcine) 5000 UNIT/ML injection 5,000 Units, 5,000 Units, O2 and wean as tolerated                 IS         Dementia  Plan supportive care     DVT px  heparin     EOL  From admission:   Readdressed code status with DTR  She spoke to brother and both agree full code  Discussed her clinical problems including wo

## 2021-02-25 NOTE — PLAN OF CARE
Pt arrived to room 526 has O2 @ 4L high flow in place. Applied the purewick and VS taken and WNL. Pt alert and verbal. HOB up. Pt daughter at bedside, she has prior approval to stay overnight. Pt comfortable. SR up.  Call light within reach and bed alarm on

## 2021-02-25 NOTE — PROGRESS NOTES
Pulmonary/Critical Care Follow Up Note    HPI:   Natali Larose is a 80year old female with Patient presents with:  Difficulty Breathing      PCP Shara Nava MD  Admission Attending Jose Marlow MD    Hospital Day #8    Hx limited by dementia  No so Daily  •  cholecalciferol (VITAMIN D3) cap/tab 2,000 Units, 2,000 Units, Oral, Daily  •  ipratropium-albuterol (DUONEB) nebulizer solution 3 mL, 3 mL, Nebulization, QID  •  ipratropium-albuterol (DUONEB) nebulizer solution 3 mL, 3 mL, Nebulization, Q4H PRN tolerated   Wean steroids   OOB to chair    COVID-19  D/c 10 days ago  S/p Actemra, RDV, and dex prior admission  CXR better since last admission  Plan        Follow                O2 and wean as tolerated                 IS         Dementia  Plan supporti

## 2021-02-25 NOTE — PROGRESS NOTES
[facility]    Progress Note    Vanessa Carrillo Patient Status:  Inpatient    1930 MRN [id number]   Location [facility] 5SW/SE Attending Asher Muniz MD   Hosp Day # 8 PCP Sujata Martinez MD       Subjective:   Migel Simmons guaiFENesin    Results:     Recent Labs   Lab 02/21/21  0455 02/22/21  0517 02/23/21  0857 02/24/21  0504 02/25/21  0534   RBC 4.38 4.55 4.58 4.33 4.48   HGB 13.1 13.7 13.9 13.1 13.7   HCT 40.1 41.3 42.4 39.9 41.4   MCV 91.6 90.8 92.6 92.1 92.4   MCH 29.9

## 2021-02-25 NOTE — PLAN OF CARE
Problem: Patient Centered Care  Goal: Patient preferences are identified and integrated in the patient's plan of care  Description: Interventions:  - What would you like us to know as we care for you?  Previously here with COVID in January.   - Provide ti Problem: SKIN/TISSUE INTEGRITY - ADULT  Goal: Skin integrity remains intact  Description: INTERVENTIONS  - Assess and document risk factors for pressure ulcer development  - Assess and document skin integrity  - Monitor for areas of redness and/or skin b fever/infection during anticipated neutropenic period  Description: INTERVENTIONS  - Monitor WBC  - Administer growth factors as ordered  - Implement neutropenic guidelines  Outcome: Progressing     Problem: SAFETY ADULT - FALL  Goal: Free from fall injury

## 2021-02-25 NOTE — BH PROGRESS NOTE
Double RN skin check done prior to transfer off Unit. Skin check performed by this RN and Jacqueline Bourgeois. RN    Wounds are as follows: stage 2  R buttocks covered with mepilex. Left  buttocks noted with bruise skin.     Will remain available for any further que

## 2021-02-26 NOTE — PHYSICAL THERAPY NOTE
PHYSICAL THERAPY TREATMENT NOTE - INPATIENT     Room Number: 526/526-A       Presenting Problem: acute on chronic respiratory failure w hypoxia    Problem List  Principal Problem:    Acute on chronic respiratory failure with hypoxia (HCC)  Active Problems: How much help from another person does the patient currently need. ..   -   Moving to and from a bed to a chair (including a wheelchair)?: A Lot   -   Need to walk in hospital room?: A Lot   -   Climbing 3-5 steps with a railing?: Total     AM-PAC Score:

## 2021-02-26 NOTE — PROGRESS NOTES
[facility]    Progress Note    Paulette Soler Patient Status:  Inpatient    1930 MRN [id number]   Location [facility] 5SW/SE Attending Kailey Spaulding MD   [facility] Day # 9 PCP Abdelrahman Nowak MD       Subjective:   Jessica Expose Metoclopramide HCl, guaiFENesin    Results:     Recent Labs   Lab 02/22/21  0517 02/23/21  0857 02/24/21  0504 02/25/21  0534 02/26/21  0537   RBC 4.55 4.58 4.33 4.46  4.48 4.55   HGB 13.7 13.9 13.1 13.6  13.7 13.8   HCT 41.3 42.4 39.9 41.6  41.4 41.8   MC reviewed. - duonebs   - IS/Pulm hygiene.   - OOB     Leukocytosis  - PCT slightly elevated now. CXR with possible developing PNA  - Cont empiric zosyn. Augmentin on discharge.    - WBC trending down.       Elevated BP   - Monitor for now if remains elevat

## 2021-02-26 NOTE — CM/SW NOTE
SW following for discharge planning. Per chart review, plan is for patient to return home with daughter with [facility]. Patient is current with E for home O2. SW discussed O2 requirements with HME liaison who reports that patient's home O2 baseline is 5L.

## 2021-02-26 NOTE — PLAN OF CARE
Problem: Patient Centered Care  Goal: Patient preferences are identified and integrated in the patient's plan of care  Description: Interventions:  - What would you like us to know as we care for you?  Previously here with COVID in January.   - Provide ti Problem: SKIN/TISSUE INTEGRITY - ADULT  Goal: Skin integrity remains intact  Description: INTERVENTIONS  - Assess and document risk factors for pressure ulcer development  - Assess and document skin integrity  - Monitor for areas of redness and/or skin b neutropenic period  Description: INTERVENTIONS  - Monitor WBC  - Administer growth factors as ordered  - Implement neutropenic guidelines  Outcome: Progressing     Problem: SAFETY ADULT - FALL  Goal: Free from fall injury  Description: INTERVENTIONS:  - As

## 2021-02-26 NOTE — SLP NOTE
SPEECH DAILY NOTE - INPATIENT    ASSESSMENT & PLAN   ASSESSMENT  PPE REQUIRED. THIS SLP WORE GOGGLES, GLOVES, AND DROPLET MASK. HANDS SANITIZED/WASHED UPON ENTRANCE/EXIT. SLP f/u x1 to ensure safety and tolerance of diet recommendations.  RN reports pat Aspiration precautions    Interdisciplinary Communication: Discussed with RN  [facility] Marquez MCMILLAN Score: 5   [facility] Impression: Abnormal     GOALS  Goal #1 The patient will tolerate chopped consistency and thin  liquids without overt signs or symptoms of aspiration with 100

## 2021-02-27 NOTE — DISCHARGE SUMMARY
[facility]    Discharge Summary    Chiquita Eye Patient Status:  Inpatient    1930 MRN [id number]   Location [facility] 5SW/SE Attending Liz Brice MD   Hosp Day # 10 PCP Maria Luz Suero MD     Date of Admission: 2    Leukocytosis  - PCT slightly elevated now. CXR with possible developing PNA  - Cont empiric zosyn. Augmentin on discharge.    - WBC trending down.       Elevated BP   - Monitor for now if remains elevated will add antihypertensive.      Dementia   - co Zay Guillermo MD on 2/19/2021 at 4:03 PM          Xr Video Swallow (cpt=[id number])    Result Date: 2/18/2021  CONCLUSION: Normal examination. Dictated by (CST): Adrien Wilkins MD on 2/18/2021 at 12:20 PM     Finalized by (CST):  Adrien Wilkins MD on 2/18/2 dilated. These findings can be seen with chronic pulmonary hypertension; correlate clinically. 5. Lesser incidental findings as above.     Dictated by (CST): Julia Gallardo MD on 2/19/2021 at 12:43 PM     Finalized by (CST): Julia Gallardo MD on 2/ medications      Instructions Prescription details   Amoxicillin-Pot Clavulanate 875-125 MG Tabs  Commonly known as: AUGMENTIN      Take 1 tablet by mouth 2 (two) times daily for 7 days.    Stop taking on: March 6, 2021  Quantity: 14 tablet  Refills: [address]  [phone]    Schedule an appointment as soon as possible for a visit in 2 weeks        Consultants     Provider Role Specialty    Heladio Carter MD Consulting Physician  PULMONARY DISEASES     Norris Mason, Enid Trujillo

## 2021-02-27 NOTE — CM/SW NOTE
CM received MDO for home O2, and resume hhc orders. CM called and spoke Armando/ HME pt is current with home O2, and no new sats are needed. [facility] notified of dc via secure chat.       [facility]  I:[phone]  I:[phone]    [facility]

## 2021-02-27 NOTE — PLAN OF CARE
Patient has discharge order in. Reviewed discharge instructions with patient's daughter. Patient to discharge to home with daughter. IV removed by this RN. Patient's daughter understands to follow up with pulmonology in 2 weeks.  Patient's daughter Brayan Black oxygen saturation or ABGs  - Provide Smoking Cessation handout, if applicable  - Encourage broncho-pulmonary hygiene including cough, deep breathe, Incentive Spirometry  - Assess the need for suctioning and perform as needed  - Assess and instruct to repor at a slow rate  - No straws  - Encourage small bites of food and small sips of liquid  - Offer pills one at a time, crush or deliver with applesauce as needed  - Discontinue feeding and notify MD (or speech pathologist) if coughing or persistent throat frankie

## 2021-02-27 NOTE — PROGRESS NOTES
Home oxygen eval:    Patient's O2 sat on room air is 87% at rest. Pt's O2 sat on room air is 85% when  exertion when transferring to chair, and 90% on 3 liter with exertion when transferring to chair.

## 2021-03-01 NOTE — PROGRESS NOTES
Virtual Telephone Check-In    Sofía Mars verbally consents to a Virtual/Telephone Check-In visit on 03/01/21. Patient has been referred to the [facility] website at [email or web address] to review the yearly Consent to Treat document.     Patient under

## 2021-03-01 NOTE — PROGRESS NOTES
[facility] planned to contact patient for TCM, however, a phone visit appointment was completed on 3/1/2021. [facility] closing encounter.

## 2021-03-01 NOTE — TELEPHONE ENCOUNTER
Yes that Thurs is my cataract.  Tell Megan Casanova to just \"take it\" and do a scheduled phone visit next Mom 3/8 with me

## 2021-03-01 NOTE — TELEPHONE ENCOUNTER
Per Dr. Stevo Orellana: Lucille Orona antibody testing to be completed this week by [facility]. Order placed and faxed to [facility].    Also obtain appt with Dr. Lucille Ortiz or Dr. Marleen Jeff in 2 weeks and then schedule OV with our office.      Called and was able to

## 2021-03-01 NOTE — TELEPHONE ENCOUNTER
Patient's daughter notified, verbalized understanding.  The following appt was scheduled     Future Appointments   Date Time Provider Sheron Clarissa   3/8/2021 10:30 AM Jose Hernandez MD EMG 24 EMG [place]   3/11/2021  8:40 AM SP PULM TECH SP PULJHONNY PENAG SPAL

## 2021-03-08 NOTE — PROGRESS NOTES
Virtual Telephone Check-In due to [address] Zia Moore verbally consents to a Virtual/Telephone Check-In visit on 03/08/21. Patient has been referred to the [facility] website at [email or web address] to review the yearly Consent to Treat document.

## 2021-03-21 NOTE — ED PROVIDER NOTES
Patient Seen in: [facility] Emergency Department    History   Patient presents with:  Swelling      HPI    The patient presents to the ED with her daughter due to concerns about a skin rash to her left posterior calf and concerns about a possible Performed by Purvi Carias MD at [facility] MAIN OR   • HIP SURGERY  8/2015    right   • HIP SURGERY Left 09/2018    [facility]   • HUMERUS FRACTURE SURGERY  8/2015    right   • IR VERTEBROPLASTY  11/10/2017    T12 by    • LUMPECTOMY LEFT  2002    and [employer]:       Active Member of Clubs or Organizations:       Attends Club or Organization Meetings:       Marital Status:   Intimate Partner Violence:       Fear of Current or Ex-Partner:       Emotionally Abused:       Physically Abused:       oriented to person, place, and time.    Psychiatric:         Mood and Affect: Mood normal.         Behavior: Behavior normal.         ED Course      Labs Reviewed - No data to display      Imaging Results Available and Reviewed while in ED: No results found

## 2021-03-23 NOTE — PROGRESS NOTES
Virtual Telephone Check-In    Faiza Mcpherson verbally consents to a Virtual/Telephone Check-In visit on 03/22/21. Patient has been referred to the [facility] website at [email or web address] to review the yearly Consent to Treat document.     Patient under

## 2021-03-24 NOTE — TELEPHONE ENCOUNTER
Agree more rehab-I offered home (Residential), [place] [facility], or Nursing home(hopefully not)-she will get back to me ask me Thurs am

## 2021-03-24 NOTE — TELEPHONE ENCOUNTER
Priya Nj (EDDIE) [facility] called regarding patient Rodolfo Chaudhry (EDDIE) will re-cert for Home health. Patient also has a wound on her sacrum, will treat with ointment.

## 2021-03-24 NOTE — TELEPHONE ENCOUNTER
Called and spoke with daughter. She states patients \"demeaner\" is not great. She is not motivated to do rehab at home.  Fredo Reveal states she is not sure if she is able to rehab her at home and would like to know your thoughts on her going to a rehab facility

## 2021-03-24 NOTE — TELEPHONE ENCOUNTER
Betzaida Dent called regarding her mom Papito Munoz, family thinks mom needs more rehab?  Please call

## 2021-03-25 NOTE — CM/SW NOTE
0930: LIVE received call from pt's daughter Kristin Osborne requesting assistance for placement to [facility] from the community. Per Kristin Osborne she is looking at [facility] in [facility]. LIVE informed Kristin Osborne to call [facility] and talk to them.  As well as return call if the

## 2021-03-30 NOTE — TELEPHONE ENCOUNTER
Phone visit scheduled today    Future Appointments   Date Time Provider Sheron Clarissa   3/30/2021  9:30 AM Car Deluna MD EMG 24 EMG [place]   5/11/2021  8:40 AM SP PULM TECH SP PULM DMG [place]   5/11/2021  9:20 AM Adilene Garcia MD SP PULM DMG S

## 2021-03-30 NOTE — PROGRESS NOTES
Patient has been referred to the [facility] website at [email or web address] to review the yearly Consent to Treat document.     Patient understands and accepts financial responsibility for any deductible, co-insurance and/or co-pays associated with this s

## 2021-04-13 NOTE — TELEPHONE ENCOUNTER
Salvador Aguiar from [facility] called just to inform Dr that patient was discharged from [facility] will begin seeing patient for nursing and p/t.  No call back needed

## 2021-04-26 NOTE — TELEPHONE ENCOUNTER
Rubens Luna is asking Dr. Janell Chen to submit prescription for Galantamine Hydrobromide ER 16 MG Oral Capsule SR 24 Hr 16mg. Per their conversation this past weekend.

## 2021-04-26 NOTE — TELEPHONE ENCOUNTER
Pended if instructions are to stay the same per your conversation with Prudencio Caraballo this past weekend.

## 2021-04-28 NOTE — PROGRESS NOTES
Virtual Telephone Check-In    Jace Dinh verbally consents to a Virtual/Telephone Check-In visit on 04/28/21. Patient has been referred to the [facility] website at [email or web address] to review the yearly Consent to Treat document.     Patient under

## 2021-05-04 NOTE — TELEPHONE ENCOUNTER
Sarabjit iLn from [facility] called just wanting to let Dr know that she will be seeing patient this week for a p/t eval.  No call back needed

## 2021-05-05 PROBLEM — J81.0 ACUTE PULMONARY EDEMA (HCC): Status: ACTIVE | Noted: 2021-01-01

## 2021-05-05 PROBLEM — R77.8 ELEVATED TROPONIN: Status: ACTIVE | Noted: 2021-01-01

## 2021-05-05 PROBLEM — E87.5 HYPERKALEMIA: Status: ACTIVE | Noted: 2021-01-01

## 2021-05-05 PROBLEM — R79.89 ELEVATED TROPONIN: Status: ACTIVE | Noted: 2021-01-01

## 2021-05-05 PROBLEM — J90 PLEURAL EFFUSION ON LEFT: Status: ACTIVE | Noted: 2021-01-01

## 2021-05-05 PROBLEM — D72.829 LEUKOCYTOSIS, UNSPECIFIED TYPE: Status: ACTIVE | Noted: 2021-01-01

## 2021-05-05 NOTE — CONSULTS
[facility]OSP - [place] [facility]    Cardiology Consultation  [facility]    Carmela Longo Patient Status:  Inpatient    1930 MRN [id number]   Location [facility] 2W/SW Attending Nasima Marrero MD   Hosp Day # 0 PCP Leeann Pritchard 8/13/2017 7-29-17   • History of syncope 5/11/15   • Hyperlipidemia    • Influenza vaccine refused 10/10/2013   • Leg fracture 1980's    resolved   • Macular degeneration    • OA (osteoarthritis) of knee 3/27/12    bilat, severe   • Onychomycosis 6/28/1 Q15 Min PRN  nitroGLYCERIN (NITROSTAT) SL tab 0.4 mg, 0.4 mg, Sublingual, Q5 Min PRN  aspirin chewable tab 81 mg, 81 mg, Oral, Daily  ondansetron HCl (ZOFRAN) injection 4 mg, 4 mg, Intravenous, Q6H PRN  Insulin Regular Human (NOVOLIN R) 100 UNIT/ML injecti 31 (!) 89 % —   05/05/21 0700 101/71 — — 92 23 93 % —   05/05/21 0600 91/68 — — 94 22 93 % —   05/05/21 0500 90/57 — — 92 23 90 % —   05/05/21 0430 106/60 — — 90 18 90 % —   05/05/21 0400 (!) 89/66 97.8 °F (36.6 °C) Temporal 92 26 94 % —   05/05/21 0313 10 or rebound, BS-present. Extremities: Without clubbing, cyanosis or edema. Neurologic: Alert and oriented, normal affect. No focal defects  Skin: Warm and dry.      Results:     Laboratory Data:  Lab Results   Component Value Date    WBC 19.6 (H) 05/05/20 developed    Dictated by (CST): Mary Richards MD on 5/05/2021 at 7:12 AM     Finalized by (CST): Mary Richards MD on 5/05/2021 at 7:15 AM          TTE 1/18/2021  Study Conclusions   1. Left ventricle:  The cavity size was normal. Wall thickness dementia, and fall risk    Elevated pro-BNP  - Increased from last admission on 2/2021, appears euvolemic without evidence of vascular congestion on CXR; likely secondary to underlying lung pathology  - Received lasix 40 mg IV on admission  - Continue to m

## 2021-05-05 NOTE — CONSULTS
Pulmonary/Critical Care Consultation Note    HPI:   Guerrero Ramirez is a 80year old female with chief complaint of Patient presents with:  Difficulty Breathing    Karenann Kawasaki, MD    I was asked by the attending physician to see this patient in pulmonary/ Pneumonia due to COVID-19 virus    • Post herpetic neuralgia     left scapula   • Prediabetes    • Right humeral fracture 08/2015    OR   • Rotator cuff tear, right 1/2010    OR   • Temporal lobe seizure ([facility] [address] 8/2013    resolved   • Vitamin B 12 deficiency premix [id number]/250ml, 12 Units/kg/hr, Intravenous, Continuous  Vancomycin HCl (VANCOCIN) 1 mg in sodium chloride 0.9% 250 mL IVPB, 1 mg, Intravenous, See Admin Instructions (RX holding)  [COMPLETED] Magnesium Sulfate IVPB premix SOLN 2 g, 2 g, Intraveno Alcohol and Other Disorders Associated Brother    • Diabetes Brother    • High Blood Pressure Son             PHYSICAL EXAM:   BP (!) 114/98 (BP Location: Left arm)   Pulse 93   Temp 97.7 °F (36.5 °C)   Resp 26   Wt 149 lb 0.5 oz (67.6 kg)   SpO2 (!) 87% CCU   O2    NIV prn   Iv abx   Cards consult   CT chest and consider contrast- test held 2/2 severe dyspnea when supine   Consider thora after CT chest    COVID  Originally admitted 1/17 with readmission for recurrent resp failure  Now readmitted as per ab

## 2021-05-05 NOTE — ED PROVIDER NOTES
Patient Seen in: [facility] Emergency Department      History   Patient presents with:  Difficulty Breathing    Stated Complaint: shortness of breath    HPI/Subjective:   HPI  Patient is a 60-year-old female history of CAD status post PCI, diastol COVID-19 virus    • Post herpetic neuralgia     left scapula   • Prediabetes    • Right humeral fracture 08/2015    OR   • Rotator cuff tear, right 1/2010    OR   • Temporal lobe seizure ([facility] [address] 8/2013    resolved   • Vitamin B 12 deficiency    • Vitamin D d Mouth: Mucous membranes are moist.   Eyes:      Extraocular Movements: Extraocular movements intact. Conjunctiva/sclera: Conjunctivae normal.      Pupils: Pupils are equal, round, and reactive to light.    Cardiovascular:      Rate and Rhythm: Normal r limits   TROPONIN I - Abnormal; Notable for the following components:    Troponin 0.247 (*)     All other components within normal limits   URINALYSIS WITH CULTURE REFLEX - Abnormal; Notable for the following components:    Clarity Urine Hazy (*)     Leuko Absolute 17.01 (*)     Monocyte Absolute 1.17 (*)     All other components within normal limits   PROCALCITONIN - Normal   PTT, ACTIVATED - Normal   MAGNESIUM - Normal   CREATININE, SERUM - Normal   RAPID SARS-COV-2 BY PCR - Normal   CBC WITH DIFFERENTIAL tachycardia, left axis, wide complex, T wave inversion in aVL and        lead I, T wave inversion in V2. No ST changes. EKG    Rate, intervals and axes as noted on EKG Report.   Rate: 110  Rhythm: Atrial Fibrillation  Reading: Irregular rhythm, left axi with a primary care provider within the next three months to obtain basic health screening including reassessment of your blood pressure.       Medications Prescribed:  Current Discharge Medication List                          Hospital Problems     Present

## 2021-05-05 NOTE — PROGRESS NOTES
[facility] Dosing Service    Follow-up Pharmacokinetic Consult for Vancomycin AUC Dosing     Ezequiel Hilliard is a 80year old patient who is being treated for pneumonia due to suspected or confirmed MRSA.    Patient received vancomycin 1750 mg IV and fi

## 2021-05-05 NOTE — PROGRESS NOTES
[facility] Dosing Service    Initial Pharmacokinetic Consult for Vancomycin AUC Dosing    Paula Black is a 80year old patient who is being treated for pneumonia due to suspected or confirmed MRSA.   Pharmacy has been asked to dose vancomycin by [facility]

## 2021-05-05 NOTE — RESPIRATORY THERAPY NOTE
Pt placed on BiPAP due to increased work of breathing and low saturations. BiPAP settings S/T 10/5 50%. Pt still breathing in low 30's. ABG drawn, results as follow:     Ref.  Range 5/4/2021 23:05   ABG PH Latest Ref Range: 7.35 - 7.45  7.31 (L)   ABG PCO2

## 2021-05-05 NOTE — PROGRESS NOTES
ADMISSION NOTE    80year old female with severe covid earlier this year with subsequent pulmonary fibrosis presents with cough and sob unresponsive to zpack at home.   Found to be very hypoxic with elevated troponins, leukocytosis, hyoxemia, pleural effusi

## 2021-05-05 NOTE — PROGRESS NOTES
05/05/21 0858   Wound 05/05/21 Leg Distal;Lower; Posterior; Left   Date First Assessed/Time First Assessed: 05/05/21 0857   Present on Hospital Admission: Yes  Location: Leg  Wound Location Orientation: Distal;Lower; Posterior; Left  Wound Description (Comm • Prediabetes    • Right humeral fracture 08/2015    OR   • Rotator cuff tear, right 1/2010    OR   • Temporal lobe seizure ([facility] [address] 8/2013    resolved   • Vitamin B 12 deficiency    • Vitamin D deficiency      Past Surgical History:   Procedure Laterality Da 05/05/2021     05/05/2021    CO2 27.0 05/05/2021     (H) 05/05/2021    CA 8.6 05/05/2021    ALB 2.4 (L) 05/05/2021    ALKPHO 78 05/05/2021    BILT 0.5 05/05/2021    TP 6.2 (L) 05/05/2021    AST 15 05/05/2021    ALT 14 05/05/2021    MG 1.8 05/0

## 2021-05-05 NOTE — PLAN OF CARE
Pt admitted from ED due to SOB. Pt remains alert to self and confused which is patients baseline. Venturi mask 12L/50% in place. HR 90-130s afib. BP marginal but stable. Zhang placed in ED. Heparin gtt. Bed locked and in lowest position.  Daughter at bedsid Continuous cardiac monitoring, monitor vital signs, obtain 12 lead EKG if indicated  - Evaluate effectiveness of antiarrhythmic and heart rate control medications as ordered  - Initiate emergency measures for life threatening arrhythmias  - Monitor electro restriction as ordered  - Instruct patient on fluid and nutrition restrictions as appropriate  Outcome: Progressing     Problem: SKIN/TISSUE INTEGRITY - ADULT  Goal: Skin integrity remains intact  Description: INTERVENTIONS  - Assess and document risk fact Functional Mobility  Goal: Achieve highest/safest level of mobility/gait  Description: Interventions:  - Assess patient's functional ability and stability  - Promote increasing activity/tolerance for mobility and gait  - Educate and engage patient/family i

## 2021-05-05 NOTE — PLAN OF CARE
Patient is oriented to person only. Venturi mask is on 12L of 50% FiO2. When mask is off for more than a minute, patient SpO2 decreased to the 80's and has shortness of breath. Patient is also restless at time.  Re-education on the importance of the venturi edema, trend weights  Outcome: Progressing  Goal: Absence of cardiac arrhythmias or at baseline  Description: INTERVENTIONS:  - Continuous cardiac monitoring, monitor vital signs, obtain 12 lead EKG if indicated  - Evaluate effectiveness of antiarrhythmic replacement as ordered  - Monitor response to electrolyte replacements, including rhythm and repeat lab results as appropriate  - Fluid restriction as ordered  - Instruct patient on fluid and nutrition restrictions as appropriate  Outcome: Progressing impaired, hearing impaired and aphasic patients to use assistive/communication devices  Outcome: Progressing     Problem: Impaired Functional Mobility  Goal: Achieve highest/safest level of mobility/gait  Description: Interventions:  - Assess patient's fun

## 2021-05-05 NOTE — ED INITIAL ASSESSMENT (HPI)
Patient arrives via ems with c/o shortness of breath starting tonight. spo2 80% on 5L per ems. Patient normally on 2L NC at home. History of covid in January.

## 2021-05-05 NOTE — HOME CARE LIAISON
This is a current patient with [facility] and will need a MEERA order on or before the day of DC.

## 2021-05-06 NOTE — PLAN OF CARE
Problem: Patient Centered Care  Goal: Patient preferences are identified and integrated in the patient's plan of care  Description: Interventions:  - What would you like us to know as we care for you?   - Provide timely, complete, and accurate informatio threatening arrhythmias  - Monitor electrolytes and administer replacement therapy as ordered  Outcome: Progressing     Problem: RESPIRATORY - ADULT  Goal: Achieves optimal ventilation and oxygenation  Description: INTERVENTIONS:  - Assess for changes in r INTERVENTIONS  - Assess and document risk factors for pressure ulcer development  - Assess and document skin integrity  - Monitor for areas of redness and/or skin breakdown  - Initiate interventions, skin care algorithm/standards of care as needed  Outcome gait  - Educate and engage patient/family in tolerated activity level and precautions  Outcome: Progressing     Problem: Safety Risk - Non-Violent Restraints  Goal: Patient will remain free from self-harm  Description: INTERVENTIONS:  - Apply the least res

## 2021-05-06 NOTE — PLAN OF CARE
Juan Mg is alert to self and pleasantly confused. Denies any complaints of pain today.  Afib on monitor - heparin drip, therapeutic this morning - held this afternoon per verbal order Dr. Fransisco Nava in anticipation of thoracentesis for large left pleural effusion rhythm and assess patient for signs and symptoms of electrolyte imbalances  - Administer electrolyte replacement as ordered  - Monitor response to electrolyte replacements, including rhythm and repeat lab results as appropriate  - Fluid restriction as orde

## 2021-05-06 NOTE — PROGRESS NOTES
[facility]  Progress Note     Mahi Gary  : 1930    Status: Inpatient  Day #: 1    Attending: Belkis Escalona MD  PCP: Fareed Beebe MD      Assessment and Plan     Healthcare-associated pneumonia  Acute on chronic hypoxic respirator 24.1* 19.6* 18.4*   HGB 11.9* 10.7* 11.1*   HCT 39.4 35.3 36.1   .0* 484.0* 534.0*  534.0*   RBC 4.38 3.97 4.02   MCV 90.0 88.9 89.8   MCH 27.2 27.0 27.6   MCHC 30.2* 30.3* 30.7*   RDW 17.7* 17.4* 18.1*   NEPRELIM 17.36* 17.01* 14.86*     Recent Lab Beth Zaidi MD on 5/06/2021 at 8:17 AM          XR CHEST AP PORTABLE  (CPT=71045)    Result Date: 5/5/2021  CONCLUSION:  1. Cardiomegaly. Tortuous atherosclerotic aorta. 2. Diffuse pulmonary interstitial prominence/fibrosis.   3. Superimposed exten **OR** Glucose-Vitamin C **OR** dextrose **OR** glucose **OR** Glucose-Vitamin C, nitroGLYCERIN, ondansetron HCl      Spent >35 minutes, >50% of the time counseling coordinating care.   Discussed plan of care with patient and daughter    Christina Wise MD

## 2021-05-06 NOTE — PROGRESS NOTES
Pulmonary/Critical Care Follow Up Note    HPI:   Sofía Mars is a 80year old female with Patient presents with:  Difficulty Breathing      PCP Ludwin Tolentino MD  Admission Attending Melissa Burroughs 15 Day #1    No complaints   Pt is de Glucose-Vitamin C (DEX-4) chewable tab 8 tablet, 8 tablet, Oral, Q15 Min PRN  •  nitroGLYCERIN (NITROSTAT) SL tab 0.4 mg, 0.4 mg, Sublingual, Q5 Min PRN  •  aspirin chewable tab 81 mg, 81 mg, Oral, Daily  •  ondansetron HCl (ZOFRAN) injection 4 mg, 4 mg, I hypercapnia  High wbc but low PCT  CXR with new findings and now submassive pleural effusion on L  Also + BNP and trop  Suspect some combination of PNA, ACS, CHF, and new Afib  Back on NIV  Plan       NIV     O2                 Iv abx                Thorac

## 2021-05-06 NOTE — H&P
[facility]    PATIENT'S NAME: Javier Taylor   ATTENDING PHYSICIAN: Landen Brice MD   PATIENT ACCOUNT#:   [de-identified]    LOCATION:  [address] [place] [address] RECORD #:   [id number]       YOB: 1930  ADMISSION DATE:       05/04/2021 100% non-rebreather and CPAP in the emergency room for a while. She was started on IV Zosyn and vancomycin for healthcare-associated pneumonia and was also noted on labs to have an elevated troponin.   Cardiology and Pulmonary were consulted, and the christae family currently. Needs assistance with all activities of daily living. REVIEW OF SYSTEMS:  Twelve systems reviewed. Family has noticed a decrease in her appetite since her COVID diagnosis but even more since she returned home from rehab.   She, accord Healthcare-associated pneumonia with associated pleural effusion. Placed on Zosyn, vancomycin, nebulizers, and oxygen therapy. Pulmonary consult obtained.   The patient will likely need at least a diagnostic thoracentesis to rule out a parapneumonic effu

## 2021-05-06 NOTE — CM/SW NOTE
Received MDO for home health evaluation. Met with patient and daughter Deirdre Guerrero at bedside, patient on bipap machine, for assessment.  Patient, Martin Montaño /kids live in a split level home w/ 14 stairs total. [facility]

## 2021-05-07 NOTE — PLAN OF CARE
Problem: CARDIOVASCULAR - ADULT  Goal: Maintains optimal cardiac output and hemodynamic stability  Description: INTERVENTIONS:  - Monitor vital signs, rhythm, and trends  - Monitor for bleeding, hypotension and signs of decreased cardiac output  - Evalua assess patient for signs and symptoms of electrolyte imbalances  - Administer electrolyte replacement as ordered  - Monitor response to electrolyte replacements, including rhythm and repeat lab results as appropriate  - Fluid restriction as ordered  - Inst b/p 's. Pt. Has been afebrile. Pt. Noted to have coughing when drinking water and swallow eval done today. Plan for ct mikel. Zhang discontinues and purewick applied per nurse driven protocol.        Problem: NEUROLOGICAL - ADULT  Goal: Achieves maximal

## 2021-05-07 NOTE — SLP NOTE
ADULT SWALLOWING EVALUATION    ASSESSMENT    ASSESSMENT/OVERALL IMPRESSION:    PPE REQUIRED. THIS SLP WORE GLOVES AND DROPLET MASK. HANDS SANITIZED/WASHED UPON ENTRANCE/EXIT. This BSE was ordered d/t report of \"Pt coughing when drinking water. \" Pt res Scale, Pt's swallow function is Level 4 of 7. Collaborated with RN regarding Pt's swallowing plan of care. BSE results/recommendations discussed with Pt; fair understanding/reinforcement needed. Swallowing precautions written on white board in Pt room. failure 8/13/2017 7-29-17   • History of syncope 5/11/15   • Hyperlipidemia    • Influenza vaccine refused 10/10/2013   • Leg fracture 1980's    resolved   • Macular degeneration    • OA (osteoarthritis) of knee 3/27/12    bilat, severe   • Onychomycosi will utilize compensatory strategies as outlined by  BSSE (clinical evaluation) including Slow rate, Small bites, Small sips, Alternate liquids/solids, No straws, Upright 90 degrees with mild feeding assistance 95 % of the time across 3 sessions.     In Pro

## 2021-05-07 NOTE — PLAN OF CARE
Pt is A&Ox1, daughter at bedside, iv zosyn continued. Thoracentesis preformed by Dr. Eduarda Roberts during change of shift. 1600ml of fluid output. Site C/D/I. At around 2300 pt received 500cc bolus due to hypotension. Stat XRAY was preformed as well.  Monitoring bl output and hemodynamic stability  Description: INTERVENTIONS:  - Monitor vital signs, rhythm, and trends  - Monitor for bleeding, hypotension and signs of decreased cardiac output  - Evaluate effectiveness of vasoactive medications to optimize hemodynamic effectiveness of GI medications  - Encourage mobilization and activity  - Obtain nutritional consult as needed  - Establish a toileting routine/schedule  - Consider collaborating with pharmacy to review patient's medication profile  Outcome: Progressing staff  - Avoid use of toothpicks and dental floss  - Use electric shaver for shaving  - Use soft bristle tooth brush  - Limit straining and forceful nose blowing  Outcome: Progressing     Problem: NEUROLOGICAL - ADULT  Goal: Achieves maximal functionality

## 2021-05-07 NOTE — PLAN OF CARE
I spoke to hemo/coag @ ext [phone], pending thoracentesis pleural fluid differential and waiting on pathologist.     I also spoke to damntheradio. RADHA DE JESUSN in regards to restarting heparin gtt. Pt. Was taken off the heparin gtt for the thoracentesis yesterday.   Pe

## 2021-05-07 NOTE — PROGRESS NOTES
Pulmonary/Critical Care Follow Up Note    HPI:   Sagar Farmer is a 80year old female with Patient presents with:  Difficulty Breathing      PCP Kristina Pepe MD  Admission Attending [facility] Day #2    No complaints   Pt is de Intravenous, Q15 Min PRN **OR** glucose (DEX4) oral liquid 30 g, 30 g, Oral, Q15 Min PRN **OR** Glucose-Vitamin C (DEX-4) chewable tab 8 tablet, 8 tablet, Oral, Q15 Min PRN  •  nitroGLYCERIN (NITROSTAT) SL tab 0.4 mg, 0.4 mg, Sublingual, Q5 Min PRN  •  asp CL 99 102 105   CO2 29.0 29.0 29.0           ASSESSMENT/PLAN:     Acute resp failure  Hypoxemic and mild hypercapnia  High wbc but low PCT  CXR with new findings and now submassive pleural effusion on L  Also + BNP and trop  Suspect some combination of P

## 2021-05-07 NOTE — PROCEDURES
Thoracentesis Procedure Note    Pre-operative Diagnosis: Large L pleural effusion    Post-operative Diagnosis: Same    Indications: Diagnostic and therapeutic removal of pleural fluid    Procedure Details   Consent: Informed consent was obtained.  Risks of

## 2021-05-07 NOTE — PROGRESS NOTES
[facility]  Progress Note     Kit Ring  : 1930    Status: Inpatient  Day #: 2    Attending: Xavi Ross MD  PCP: Maty Schwarz MD      Assessment and Plan     Possible Healthcare-associated pneumonia  Acute on chronic hypoxic r 36.1 35.0 36.7   .0*  534.0* 463.0* 477.0*  477.0*   RBC 4.02 3.94 4.13   MCV 89.8 88.8 88.9   MCH 27.6 27.4 27.6   MCHC 30.7* 30.9* 31.1   RDW 18.1* 18.0* 18.3*   NEPRELIM 14.86* 13.29* 11.43*     Recent Labs   Lab 05/05/21  0430 05/05/21  0430 05/ 5/07/2021 at 7:10 AM     Finalized by (CST): Genevieve Vasquez MD on 5/07/2021 at 7:12 AM          XR CHEST AP PORTABLE  (CPT=71045)    Result Date: 5/6/2021  CONCLUSION:  1. Favorable change post left thoracentesis.   Significantly improved aeration of the

## 2021-05-08 NOTE — PLAN OF CARE
Problem: Patient Centered Care  Goal: Patient preferences are identified and integrated in the patient's plan of care  Description: Interventions:  - What would you like us to know as we care for you?  From home with daughter  - Provide timely, complete, or at baseline  Description: INTERVENTIONS:  - Continuous cardiac monitoring, monitor vital signs, obtain 12 lead EKG if indicated  - Evaluate effectiveness of antiarrhythmic and heart rate control medications as ordered  - Initiate emergency measures for and repeat lab results as appropriate  - Fluid restriction as ordered  - Instruct patient on fluid and nutrition restrictions as appropriate  Outcome: Progressing     Problem: SKIN/TISSUE INTEGRITY - ADULT  Goal: Skin integrity remains intact  Description: devices  Outcome: Progressing     Problem: Impaired Functional Mobility  Goal: Achieve highest/safest level of mobility/gait  Description: Interventions:  - Assess patient's functional ability and stability  - Promote increasing activity/tolerance for mobi

## 2021-05-08 NOTE — PROGRESS NOTES
[facility]  Progress Note     Suri Huff  : 1930    Status: Inpatient  Day #: 3    Attending: Adelina Verduzco MD  PCP: Lynnette Porter MD      Assessment and Plan     Possible Healthcare-associated pneumonia  Acute on chronic hypoxic r 11.4* 10.6*  10.6*   HCT 35.0 36.7 35.2  35.2   .0* 477.0*  477.0* 428.0  428.0  428.0   RBC 3.94 4.13 3.91  3.91   MCV 88.8 88.9 90.0  90.0   MCH 27.4 27.6 27.1  27.1   MCHC 30.9* 31.1 30.1*  30.1*   RDW 18.0* 18.3* 18.3*  18.3*   NEPRELIM 13.29* 1 (CPT=71045)    Result Date: 5/8/2021  CONCLUSION:  1. Cardiomegaly. Tortuous thoracic aorta. 2. Pulmonary interstitial fibrotic changes with superimposed mixed alveolar and interstitial airspace opacification with accompanying bilateral effusions.   Shannan vancomycin HCl  125 mg Oral Daily   • vancomycin  1,500 mg Intravenous Q24H      PRN Meds: glucose **OR** Glucose-Vitamin C **OR** dextrose **OR** glucose **OR** Glucose-Vitamin C, nitroGLYCERIN, ondansetron HCl      Spent >35 minutes, >50% of the time cou

## 2021-05-08 NOTE — CONSULTS
Oncology Consultation Note    Patient Name: Guerrero Ramirez   YOB: 1930   Medical Record Number: [id number]   CSN: [id number]   Consulting Physician: Jeovanny Negron MD  Referring Physician(s): Juan Alberto Angelo MD  Date of Consultation: 5/8/2021 covid-19 infection   • Esophageal foreign body 8/13/2017 7-29-17   • Esophageal perforation 8/13/2017 7-29-17   • Frequent falls    • History of acute respiratory failure 8/13/2017 7-29-17   • History of syncope 5/11/15   • Hyperlipidemia    • Inf Smoker        Types: Cigarettes        Quit date: 1984        Years since quittin.4      Smokeless tobacco: Never Used      Tobacco comment: quit smoking in     Substance and Sexual Activity      Alcohol use: Yes        Comment: rare +fatigue  Eyes: Negative for visual disturbance, irritation and redness. Respiratory: Negative for cough, hemoptysis, chest pain, +dyspnea on exertion.   Gastrointestinal: Negative for dysphagia, odynophagia, reflux symptoms, nausea, vomiting, change in kathrin HGB 10.6 (L) 05/08/2021 03:41 AM    HCT 35.2 05/08/2021 03:41 AM    HCT 35.2 05/08/2021 03:41 AM    MCV 90.0 05/08/2021 03:41 AM    MCV 90.0 05/08/2021 03:41 AM    MCH 27.1 05/08/2021 03:41 AM    MCH 27.1 05/08/2021 03:41 AM    MCHC 30.1 (L) 05/08/2021 03: 4. Hyperinflation.  Stable interstitial opacities, which may relate to interstitial lung disease or post infectious pulmonary fibrosis.      US thoracentesis 5/6/2021    INDICATIONS: Pleural effusion       DESCRIPTION: Ultrasound imaging of the left thora 80%. CD3 and CD5 highlight few scattered background T cells while pankeratin (AE1/AE3) and CK8/18 demonstrate a few scattered background reactive mesothelial cells.     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  There is a mini-RCHOP aleida MD [facility]  [address] [place] [id number]  [address] [facility]

## 2021-05-08 NOTE — PROGRESS NOTES
[facility]    Progress Note    Mickey Nobles Patient Status:  Inpatient    1930 MRN [id number]   Location [facility] 2W/SW Attending Chirag Ballesteros MD   [facility] Day # 3 PCP Ho Garcia MD     HPI/Subjective:     Constitutional 05/08/2021    TP 5.6 (L) 05/08/2021    AST 15 05/08/2021    ALT 12 (L) 05/08/2021    PTT 68.1 (H) 05/06/2021    INR 0.9 09/07/2018    TSH 2.130 04/21/2016    DDIMER 1.03 (H) 05/05/2021    ESRML 13 11/08/2017    CRP 0.88 (H) 02/15/2021    MG 2.4 05/06/2021 infectious pulmonary fibrosis.    Dictated by (CST): Santhosh Cage MD on 5/06/2021 at 9:29 PM     Finalized by (CST): Santhosh Cage MD on 5/06/2021 at 9:32 PM                Assessment & Plan:      1- Acute respiratory failure / Hypoxemic and mild hyper

## 2021-05-09 NOTE — PROGRESS NOTES
[facility] - [facility]  Oncology  Progress Note    Jace Tao Patient Status:  Inpatient    1930 MRN [id number]   Location [facility] 4W/SW/SE Attending Alyson Haley MD   Hosp Day # 4 PCP Benton Flanagan MD     Patient with high grade 05/08/2021     05/08/2021    K 3.8 05/08/2021     05/08/2021    CO2 31.0 05/08/2021     (H) 05/08/2021    CA 8.5 05/08/2021    ALB 2.2 (L) 05/08/2021    ALKPHO 56 05/08/2021    BILT 0.6 05/08/2021    TP 5.6 (L) 05/08/2021    AST 15 05/08 result in remission however uncertain as to how long the remission would last     --I reviewed with the patient and her family the high cell turnover rate of 80% consistent with a fast growing lymphoma process  --Discussed for high-grade B-cell lymphomas t have placed the consultation for hospice and informed the pt's daughter of the [place] as she expresses interest in DNR/Do not intubate/No tube feeds     --I am hoping the patient's family could meet with hospice today        Thank you for allowing m

## 2021-05-09 NOTE — CM/SW NOTE
5/9 MDO; HOSPICE [facility] has mtg/appt w/rasta 5/10 @10am.    CM/SW to remain available for support and/or discharge planning.     Taina Meter RN, [facility]    Ext. [phone]

## 2021-05-09 NOTE — PLAN OF CARE
Patient transferred from CCU to room 451 just prior to this RN's arrival. Ozzie Chambers is resting in bed comfortably this evening. She denies any pain. Remains AOx1. On 4L oxygen with scheduled nebs. Has a moist, nonproductive cough.  Expiratory wheezes heard int findings  - See additional Care Plan goals for specific interventions  Outcome: Progressing     Problem: CARDIOVASCULAR - ADULT  Goal: Maintains optimal cardiac output and hemodynamic stability  Description: INTERVENTIONS:  - Monitor vital signs, rhythm, a baseline bowel function  Description: INTERVENTIONS:  - Assess bowel function  - Maintain adequate hydration with IV or PO as ordered and tolerated  - Evaluate effectiveness of GI medications  - Encourage mobilization and activity  - Obtain nutritional con to achieve adequate hemostasis  - Assess for signs and symptoms of internal bleeding  - Monitor lab trends  - Patient is to report abnormal signs of bleeding to staff  - Avoid use of toothpicks and dental floss  - Use electric shaver for shaving  - Use sof

## 2021-05-09 NOTE — HOSPICE RN NOTE
New referral received for hospice, eve sent in. Hospice informational scheduled for 10am tomorrow morning. Updated Cipriano Taylor RN.

## 2021-05-09 NOTE — PROGRESS NOTES
[facility] Dosing Service    Follow-up Pharmacokinetic Consult for Vancomycin AUC Dosing     Soren Burr is a 80year old patient who is being treated for pneumonia due to suspected or confirmed MRSA.   Patient is on day 4 of vancomycin and is [facility]

## 2021-05-09 NOTE — PROGRESS NOTES
Pulmonary/Critical Care Follow Up Note    HPI:   Jessy Cardenas is a 80year old female with Patient presents with:  Difficulty Breathing      PCP Tanesha Sanchez MD  Admission Attending Shannen Soriano [facility] Day #4    Now on floor  Feeling ok Acidophilus/Pectin (PROBIOTIC) CAPS 1 capsule, 1 capsule, Oral, Daily  •  Vancomycin Pharmacy to Dose based on Random Levels, 1 mg, Intravenous, PRN  •  albuterol sulfate (VENTOLIN) (2.5 MG/3ML) 0.083% nebulizer solution 2.5 mg, 2.5 mg, Nebulization, TID 51*  51* 52*  52* 64   GFRNAA 48*   < > 44*  44* 45*  45* 55*   CA 8.6  --  8.9 8.5  --      --  140 140  --    K 4.4  --  4.8 3.8  --      --  105 106  --    CO2 29.0  --  29.0 31.0  --     < > = values in this interval not displayed.

## 2021-05-09 NOTE — PROGRESS NOTES
[facility]  Progress Note     Natali Larose  : 1930    Status: Inpatient  Day #: 4    Attending: Glenn Arechiga MD  PCP: Shara Nava MD      Assessment and Plan     Possible Healthcare-associated pneumonia  Acute on chronic hypoxic r RBC 3.94 4.13 3.91  3.91   MCV 88.8 88.9 90.0  90.0   MCH 27.4 27.6 27.1  27.1   MCHC 30.9* 31.1 30.1*  30.1*   RDW 18.0* 18.3* 18.3*  18.3*   NEPRELIM 13.29* 11.43* 6.98     Recent Labs   Lab 05/05/21  0430 05/05/21  0430 05/05/21  0857 05/05/21  1353 0 interstitial fibrotic changes with superimposed mixed alveolar and interstitial airspace opacification with accompanying bilateral effusions. Findings more pronounced in the left base.     Dictated by (CST): Monique Ferraro MD on 5/08/2021 at 8:53 AM

## 2021-05-10 NOTE — PLAN OF CARE
Plan of care reviewed with patient and her son at bedside. Patient repositioned as tolerated. Pure wick remains in place. Remains on 4L O2 and neb treatments completed. Patient c/o upper back pain- Lidocaine patch applied. IV antibiotics continued.  Safety

## 2021-05-10 NOTE — SLP NOTE
SLP attempt this AM. Per chart review, pending hospice meeting 5/10/21 @1000. SLP to follow up pending [address]. Please contact SLP with any questions, concerns, and/or change in status. Thank you. Soniya Sloan  Speech Language Pathologist  P

## 2021-05-10 NOTE — PROGRESS NOTES
[facility]  [facility] Note     Erika Arora  : 1930    Status: Inpatient  Day #: 5    Attending: Eleni Williamson MD  PCP: Madison Iverson MD      Assessment and Plan     Possible Healthcare-associated pneumonia  Acute on chronic hypoxic r 463.0* 477.0*  477.0* 428.0  428.0  428.0   RBC 3.94 4.13 3.91  3.91   MCV 88.8 88.9 90.0  90.0   MCH 27.4 27.6 27.1  27.1   MCHC 30.9* 31.1 30.1*  30.1*   RDW 18.0* 18.3* 18.3*  18.3*   NEPRELIM 13.29* 11.43* 6.98     Recent Labs   Lab 05/05/21  0430 05/0 --   --    TROP 0.383*  --   --  0.293*  --   --   --  0.090*  --   --   --   --   --   --     < > = values in this interval not displayed. No results found.        Medications     • lidocaine-menthol  1 patch Transdermal Daily   • Vitamin D3 (Choleca

## 2021-05-10 NOTE — PLAN OF CARE
Plan for hospice meeting with family today at [place] [id number]. Patient resting comfortably in bed this evening, repositioning as tolerated. Lidocaine patch to left upper back. Declines the need for further pain medication. Remains AOx1, pleasantly confused.  4L O2, sa ordered  -administer oxygen  -collaborate with health care professionals   -educate family on current findings  - See additional Care Plan goals for specific interventions  Outcome: Progressing     Problem: CARDIOVASCULAR - ADULT  Goal: Maintains optimal c retention  Outcome: Progressing     Problem: GASTROINTESTINAL - ADULT  Goal: Maintains or returns to baseline bowel function  Description: INTERVENTIONS:  - Assess bowel function  - Maintain adequate hydration with IV or PO as ordered and tolerated  - Eval medication administration  - Ensure safe mobilization of patient  - Hold pressure on venipuncture sites to achieve adequate hemostasis  - Assess for signs and symptoms of internal bleeding  - Monitor lab trends  - Patient is to report abnormal signs of ble

## 2021-05-10 NOTE — SPIRITUAL CARE NOTE
Pt was sleeping, son in law at bedside. Family is very supportive and makes sure there is one person in the room with pt. Family is considering possibility of hospice care.  provided active listening and reassurance.  Pt received anointing and commu

## 2021-05-10 NOTE — PLAN OF CARE
Pt is alert and able to communicate her needs. On 5 L O2, pt SOB while lying in bed. Purewick in place, voiding freely. No bowel movements. Lidocaine patch to L shoulder. Tolerating chopped, nectar thick diet, minimal appetite. Scheduled zoysn given.  Hospi vital signs, rhythm, and trends  - Monitor for bleeding, hypotension and signs of decreased cardiac output  - Evaluate effectiveness of vasoactive medications to optimize hemodynamic stability  - Monitor arterial and/or venous puncture sites for bleeding a nutritional consult as needed  - Establish a toileting routine/schedule  - Consider collaborating with pharmacy to review patient's medication profile  Outcome: Progressing     Problem: METABOLIC/FLUID AND ELECTROLYTES - ADULT  Goal: Electrolytes maintaine shaving  - Use soft bristle tooth brush  - Limit straining and forceful nose blowing  Outcome: Progressing     Problem: NEUROLOGICAL - ADULT  Goal: Achieves maximal functionality and self care  Description: INTERVENTIONS  - Monitor swallowing and airway pa

## 2021-05-10 NOTE — PROGRESS NOTES
[facility] Dosing Service    Follow-up Pharmacokinetic Consult for Vancomycin Dosing     Mickey Nobles is a 80year old patient who is being treated for pneumonia not due to suspected or confirmed MRSA.  Vancomycin is currently being held due to eleva

## 2021-05-10 NOTE — PROGRESS NOTES
Pulmonary/Critical Care Follow Up Note    HPI:   Vanessa Carrillo is a 80year old female with Patient presents with:  Difficulty Breathing      PCP Sujata Martinez MD  Admission Attending Melissa Paz 15 Day #5    Chart review    PMH:  Pa with meals  •  Acidophilus/Pectin (PROBIOTIC) CAPS 1 capsule, 1 capsule, Oral, Daily  •  albuterol sulfate (VENTOLIN) (2.5 MG/3ML) 0.083% nebulizer solution 2.5 mg, 2.5 mg, Nebulization, TID  •  Heparin Sodium (Porcine) 5000 UNIT/ML injection 5,000 Units, --   --      --  140  --  140  --   --    K 4.4  --  4.8  --  3.8  --   --      --  105  --  106  --   --    CO2 29.0  --  29.0  --  31.0  --   --     < > = values in this interval not displayed.            ASSESSMENT/PLAN:     Acute resp failu

## 2021-05-10 NOTE — HOSPICE RN NOTE
[facility] nursing visit for hospice consult order. Met with daughter Mata Gallegos and her . Discussed hospice benefit, hospice philosophy, palliative care with questions and concerns addressed.  Family leaning toward hospice but not ready to make

## 2021-05-11 NOTE — PROGRESS NOTES
[facility]    Progress Note    Rian Joeysilas Patient Status:  Inpatient    1930 MRN [id number]   Location [facility] 4W/SW/SE Attending Kieran Colon MD   [facility] Day # 6 PCP Roselia Yu MD     HPI/Subjective:    Seen and exa Plan:   # Acute respiratory failure  # History of COVID-19  # Malignant pleural effusion  -Originally admitted 1/17 with COVID-19 and now readmission with recurrent respiratory failure likely d/t combination of malignant pleural effusion, pneumonia, CHF, a

## 2021-05-11 NOTE — SLP NOTE
SPEECH DAILY NOTE - INPATIENT    ASSESSMENT & PLAN   ASSESSMENT    PPE REQUIRED. THIS SLP WORE GLOVES AND DROPLET MASK. HANDS SANITIZED/WASHED UPON ENTRANCE/EXIT. Pt alert, afebrile and on 3 L/min 02 via NC.  Pt seen for swallow analysis per BSE recommen in puree  Patient Experiencing Pain: No  Discharge Recommendations  Discharge Recommendations/Plan: Undetermined  Treatment Plan  Treatment Plan/Recommendations: Aspiration precautions  Interdisciplinary Communication: Discussed with RN    GOALS  Goal #1 T

## 2021-05-11 NOTE — HOSPICE RN NOTE
KALPANA RN at bedside this afternoon to meet with patient and daughter Giovany Bentley [facility]). Hospice philosophy and benefits discussed, consents signed.  Tentatively scheduled to discharge from [facility] tomorrow 5/12/21 around 1600 for admission to hosp

## 2021-05-11 NOTE — PLAN OF CARE
Clinton Rough is pleasant. Denies pain, Lidocaine patch to R upper back removed. Vitals stable. Remains on 5L O2. Urine output per purewick. Dressing to LLE changed per order. Plan for continuation of hospice discussion tmrw with family.  Daughter at bedside throu vasoactive medications to optimize hemodynamic stability  - Monitor arterial and/or venous puncture sites for bleeding and/or hematoma  - Assess quality of pulses, skin color and temperature  - Assess for signs of decreased coronary artery perfusion - ex. medication profile  Outcome: Progressing     Problem: METABOLIC/FLUID AND ELECTROLYTES - ADULT  Goal: Electrolytes maintained within normal limits  Description: INTERVENTIONS:  - Monitor labs and rhythm and assess patient for signs and symptoms of electrol ADULT  Goal: Achieves maximal functionality and self care  Description: INTERVENTIONS  - Monitor swallowing and airway patency with patient fatigue and changes in neurological status  - Encourage and assist patient to increase activity and self care with g

## 2021-05-11 NOTE — PLAN OF CARE
Patient is oriented to self, conversational but forgetful at times. Patient weaned to 3 L oxygen by respiratory therapy. Patient voiding through [facility] SCREVEN and had three bowel movements today. Sivan care provided.  New Mepilex to sacrum which has blanchable re professionals   - Educate family on current findings  - Thoracentesis   - See additional Care Plan goals for specific interventions  Outcome: Progressing     Problem: CARDIOVASCULAR - ADULT  Goal: Maintains optimal cardiac output and hemodynamic stability GASTROINTESTINAL - ADULT  Goal: Maintains or returns to baseline bowel function  Description: INTERVENTIONS:  - Assess bowel function  - Maintain adequate hydration with IV or PO as ordered and tolerated  - Evaluate effectiveness of GI medications  - Encou mobilization of patient  - Hold pressure on venipuncture sites to achieve adequate hemostasis  - Assess for signs and symptoms of internal bleeding  - Monitor lab trends  - Patient is to report abnormal signs of bleeding to staff  - Avoid use of toothpicks

## 2021-05-11 NOTE — PROGRESS NOTES
[facility]  Progress Note     Erika Arora  : 1930    Status: Inpatient  Day #: 6    Attending: Eleni Williamson MD  PCP: Madison Iverson MD      Assessment and Plan     Possible Healthcare-associated pneumonia  Acute on chronic hypoxic r HGB 10.8* 11.4* 10.6*  10.6*   HCT 35.0 36.7 35.2  35.2   .0* 477.0*  477.0* 428.0  428.0  428.0   RBC 3.94 4.13 3.91  3.91   MCV 88.8 88.9 90.0  90.0   MCH 27.4 27.6 27.1  27.1   MCHC 30.9* 31.1 30.1*  30.1*   RDW 18.0* 18.3* 18.3*  18.3*   NEPRE DDIMER  --   --  1.03*  --   --   --   --   --   --   --   --   --   --   --    TROP 0.383*  --   --  0.293*  --   --   --  0.090*  --   --   --   --   --   --     < > = values in this interval not displayed. No results found.        Medications

## 2021-05-12 NOTE — CM/SW NOTE
MD order received regarding POLST- the pt. Is being [facility]. [facility] POLST.      [phone]

## 2021-05-12 NOTE — PLAN OF CARE
Patient is cleared for discharge from medical standpoint. Patient will [facility] with [facility] and transfer via ambulance. Discharge education and medications reviewed.      Problem: Patient Centered Care  Goal: Patient preferences are arterial and/or venous puncture sites for bleeding and/or hematoma  - Assess quality of pulses, skin color and temperature  - Assess for signs of decreased coronary artery perfusion - ex.  Angina  - Evaluate fluid balance, assess for edema, trend weights  O for Discharge     Problem: METABOLIC/FLUID AND ELECTROLYTES - ADULT  Goal: Electrolytes maintained within normal limits  Description: INTERVENTIONS:  - Monitor labs and rhythm and assess patient for signs and symptoms of electrolyte imbalances  - Administe NEUROLOGICAL - ADULT  Goal: Achieves maximal functionality and self care  Description: INTERVENTIONS  - Monitor swallowing and airway patency with patient fatigue and changes in neurological status  - Encourage and assist patient to increase activity and s

## 2021-05-12 NOTE — HOSPICE RN NOTE
Ambulance changed back to 4pm at request of daughter due to needing a different mattress. Flavio Nielsen RN. Pt daughter requested earlier pickup time. Called [facility] and requested 3pm  time, they will be here. Oralia Savage RN on POC.

## 2021-05-12 NOTE — PLAN OF CARE
Jordana Patel is pleasant. Denies pain, Lidocaine patch to R upper back removed. Vitals stable. Remains on 3L O2. Urine output per purewick. Dressing to LLE changed per order. Plan for home with hospice today. Daughter at bedside throughout shift.  Will continue p optimize hemodynamic stability  - Monitor arterial and/or venous puncture sites for bleeding and/or hematoma  - Assess quality of pulses, skin color and temperature  - Assess for signs of decreased coronary artery perfusion - ex.  Angina  - Evaluate fluid b Progressing     Problem: METABOLIC/FLUID AND ELECTROLYTES - ADULT  Goal: Electrolytes maintained within normal limits  Description: INTERVENTIONS:  - Monitor labs and rhythm and assess patient for signs and symptoms of electrolyte imbalances  - Administer functionality and self care  Description: INTERVENTIONS  - Monitor swallowing and airway patency with patient fatigue and changes in neurological status  - Encourage and assist patient to increase activity and self care with guidance from PT/OT  - Encourag

## 2021-05-12 NOTE — DISCHARGE SUMMARY
[facility]    Discharge Summary    Jace Tao Patient Status:  Inpatient    1930 MRN [id number]   Location [facility] 4W/SW/SE Attending Carli Klein MD   Hosp Day # 7 PCP Benton Flanagan MD     Date of Admission:  (36.4 °C) (Oral)   Resp 22   Wt 149 lb 0.5 oz (67.6 kg)   SpO2 93%   BMI 25.58 kg/m²     Gen:  NAD. Awake and alert  CV:   RRR.   No m/g/r  Pulm:   Decreased breath sounds over left side, clear with scattered crackles on right  Abd:   +bs, soft, NT, ND  LE She required 100% non-rebreather and CPAP in the emergency room for a while. She was started on IV Zosyn and vancomycin for healthcare-associated pneumonia and was also noted on labs to have an elevated troponin.   Cardiology and Pulmonary were consulted, accuchecks     Other:  -dementia  -hyperkalemia - resolved  -anemia     DVT proph: heparin sq    Consultations:   Pulmonology, cardiology, hematology        Discharge Condition:  Stable    Discharge Medications:      Discharge Medications      START taking

## 2021-05-16 ENCOUNTER — TELEPHONE (OUTPATIENT)
Dept: INTERNAL MEDICINE CLINIC | Facility: CLINIC | Age: 86
End: 2021-05-16

## 2021-05-16 NOTE — TELEPHONE ENCOUNTER
I was paged by the answering service this morning. Hospice services were notifying me that Ms. Gabino Turpin passed away late last night. Sandor Stein.  Hima Rice MD  Diplomate, [address] [place] [address] Board of Internal Medicine  Member, [address]

## 2021-05-17 ENCOUNTER — TELEPHONE (OUTPATIENT)
Dept: INTERNAL MEDICINE CLINIC | Facility: CLINIC | Age: 86
End: 2021-05-17

## 2021-06-07 RX ORDER — GALANTAMINE HYDROBROMIDE 4 MG/1
TABLET, FILM COATED ORAL
Qty: 180 TABLET | Refills: 0 | OUTPATIENT
Start: 2021-06-07

## 2021-07-21 NOTE — PROGRESS NOTES
Virtual Telephone Check-In due to [address] verbally consents to a Virtual/Telephone Check-In visit on 02/08/21. Patient has been referred to the [facility] website at [email or web address] to review the yearly Consent to Treat document. room air

## 2022-12-18 NOTE — ANESTHESIA PROCEDURE NOTES
ANESTHESIA INTUBATION  Date/Time: 9/8/2018 11:30 AM  Urgency: elective    Airway not difficult    General Information and Staff    Patient location during procedure: OR  Anesthesiologist: Davina Persaud MD  Performed: anesthesiologist     Indications an
Initial (On Arrival)

## 2022-12-21 ENCOUNTER — TELEPHONE (OUTPATIENT)
Dept: INTERNAL MEDICINE CLINIC | Facility: CLINIC | Age: 87
End: 2022-12-21

## (undated) DEVICE — ABDOMINAL PAD: Brand: CURITY

## (undated) DEVICE — HIP PINNING: Brand: MEDLINE INDUSTRIES, INC.

## (undated) DEVICE — SUTURE VICRYL 0 CP-1

## (undated) DEVICE — SUTURE VICRYL 2-0 FS-1

## (undated) DEVICE — SYRINGE MNJCT 10ML LF STRL REG

## (undated) DEVICE — SOL  .9 1000ML BTL

## (undated) DEVICE — DRILL, AO SMALL: Brand: GAMMA

## (undated) DEVICE — Device

## (undated) DEVICE — STERILE POLYISOPRENE POWDER-FREE SURGICAL GLOVES: Brand: PROTEXIS

## (undated) DEVICE — K-WIRE

## (undated) DEVICE — GOWN SURG AERO BLUE PERF XLG

## (undated) DEVICE — STANDARD HYPODERMIC NEEDLE,POLYPROPYLENE HUB: Brand: MONOJECT

## (undated) DEVICE — BATTERY

## (undated) DEVICE — TOWEL OR BLU 16X26 STRL

## (undated) NOTE — Clinical Note
Patient currently is not scheduled for hospital follow up. TE sent to front office to address. Pt's daughter Marily Block stated the pt is doing well since d/c. Pt has not complained about pain and [facility].  Daughter still has to schedule an appoin
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
## (undated) NOTE — LETTER
[address], [place]  Authorization for Invasive Procedures  1.  I hereby authorize Dr. Xin Montiel , my physician and whomever may be designated as the doctor's assistant, to perform the following operation and/or procedure:  L side tho and allergic reactions, hemolytic reactions, transmission of disease such as hepatitis, AIDS, cytomegalovirus (CMV), and flluid overload.  In the event that I wish to have autologous transfusions of my own blood, or a directed donor transfusion, I will disc Signature of Patient:  ________________________________________________ Date: _________Time: _________    Responsible person in case of minor or unconscious: _____________________________Relationship: ____________     Witness Signature: ___________________

## (undated) NOTE — IP AVS SNAPSHOT
[facility]            (For Outpatient Use Only) Initial Admit Date: 5/4/2021   Inpt/Obs Admit Date: Inpt: 5/5/21 / Obs: N/A   Discharge Date:    Kailey Verduzco:  [de-identified]   MRN: [de-identified]   CSN: [id number]   CEID: [id number]        [facility] Number:  Insurance Type:    Subscriber Name:  Subscriber :    Subscriber ID:  Pt Rel to Subscriber:    Hospital Account Financial Class: Medicare    May 12, 2021

## (undated) NOTE — IP AVS SNAPSHOT
Patient Demographics     Address  [address] [place] [address] Phone  [phone] (Home) *Preferred* E-mail Address  [email or web address]      Emergency Contact(s)     Name Relation Home Work [place] Daughter   [phone]    YHD Next dose due:  As taken at home every 2 weeks      Take 1 capsule (50,000 Units total) by mouth every 14 (fourteen) days. Leandra Zepeda MD         Galantamine Hydrobromide ER 24 MG Cp24  Commonly known as:  RAZADYNE ER  Next dose due:   Today at 6 pm with [id number] acetaminophen (TYLENOL) tab 650 mg 09/09/18 1803 Given      [id number] acetaminophen (TYLENOL) tab 650 mg 09/10/18 0123 Given      [id number] acetaminophen (TYLENOL) tab 650 mg 09/10/18 0739 Given      [id number] acetaminophen (TYLENOL) tab 650 mg BUN 9 8 - 20 mg/dL [employer]   Creatinine 0.66 0.50 - 1.50 mg/dL — [facility]   Calcium, Total 8.2 8.5 - 10.5 mg/dL  [facility]   BUN/CREA Ratio 13.6 10.0 - 20.0 — [facility]   Anion Gap 3 0 - 18 mmol/L [employer]   Calculated Osmolality 274 Author:  Anuj Aleman MD Service:  Hospitalist Author Type:  Physician    Filed:  9/10/2018 11:23 AM Status:  Signed    :  Anuj Aleman MD (Physician)       [facility]    PATIENT'S NAME: Radha Mendoza   ATTENDING PHYSICIAN: Mitzy Hernandez MEDICATIONS:  Please see medication reconciliation list.     ALLERGIES:  She has side effects to NSAIDs and tramadol. No known drug allergies. FAMILY HISTORY:  Positive for hypertension and diabetes.     SOCIAL HISTORY:  No tobacco, alcohol, or drug use 4.   Hypertension. Pain control. DVT prophylaxis. Orthopedic surgery consult, Dr. Jocelyne Green from [address] [place] [address], was notified. Further recommendations to follow.     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 There has been no recurrence of any ischemic symptoms since that time.   She did have a nuclear stress test in 2015 at this hospital which showed partially reversi SKIN:  Otherwise warm, dry, and pink. No lesions, rashes, or ulcers. LABORATORY DATA:  Basic metabolic panel remarkable for potassium of 3.3. Coags are normal.  CBC and differential are normal.    Chest x-ray shows no CHF.     EKG shows sinus rhythm wi Filed:  9/10/2018 11:52 AM Date of Service:  9/10/2018 11:43 AM Status:  Signed    :  Julio C Smith PTA (Physical Therapy Assistant)       PHYSICAL THERAPY TREATMENT NOTE - INPATIENT     Room Number: 418/418-A[RM.1]       Presenting Problem: L Management Techniques: Activity promotion; Body mechanics; Relaxation;Repositioning[RM. 2]    BALANCE[RM.1]                                                                                                                     Static Sitting: Fair +  Dynamic Sit Current Status Mod A   Goal #2 Patient is able to demonstrate transfers EOB to/from Chair/Wheelchair at assistance level: supervision with walker - rolling     Goal #2  Current Status Mod A with the RW   Goal #3 Patient is able to ambulate 150 feet with as extensive time and verbal cues for task completion. Pt able to sit EOB with feet on floor with SBA.  Sit>stand with RW mod A, stand pivot transfer with RW mod A, stand >sit into recliner with mod A. Sit>stand from recliner mod A. Gait training 15' RW mod A, • Burn of left shoulder     heating pad   • CAD (coronary artery disease) 1995    s/p stent for 90% blockage   • Closed left hip fracture ([facility] 08/2015    OR   • Esophageal foreign body 8/13/2017 7-29-17   • Esophageal perforation 8/13/2017 7-29-17 Precautions: Limb alert - left;Bed/chair alarm  Fall Risk: High fall risk    WEIGHT BEARING RESTRICTION  Weight Bearing Restriction: L lower extremity           L Lower Extremity: Weight Bearing as Tolerated    PAIN ASSESSMENT  Rating: (pt did not quantify Pattern: L Decreased stance time(slow gait speed)  Stoop/Curb Assistance: Not tested       Bed Mobility: min A    Transfers: RW mod A    Exercise/Education Provided:  Bed mobility  Body mechanics  Energy conservation  Functional activity tolerated  Gait tr Presenting Problem: LEFT hip fx    Physician Order: IP Consult to Occupational Therapy  Reason for Therapy: ADL/IADL Dysfunction and Discharge Planning    OCCUPATIONAL THERAPY ASSESSMENT     Patient is a 80year old female admitted 9/7/2018 s/p IM nail lef Closed fracture of femur, intertrochanteric, left, initial encounter [facility])      Past Medical History  Past Medical History:   Diagnosis Date   • AD (Alzheimer's disease)     mild dementia,galantamine and supplements   • Benign essential HTN    • Breast C Prior Level of Tucker: Pt lives with her daughter but also stays at her son's home. She is alone at some parts of the day at her son's home. Pt uses a cane at all times. She is able to dress and bathe herself independently per family.  Pt can stay on FUNCTIONAL TRANSFER ASSESSMENT  Supine to Sit : Minimum assistance  Sit to Stand: Moderate assistance  Toilet Transfer: NT- +shaver catheter  Shower Transfer: NT  Chair Transfer:  Mod A   Car Transfer: NT    Bedroom Mobility: Mod A x3ft x6 ft with DAVID WITT

## (undated) NOTE — IP AVS SNAPSHOT
[address]            (For Outpatient Use Only) Initial Admit Date: 11/7/2017   Inpt/Obs Admit Date: Inpt: 11/9/17 / Obs: 11/08/17   Discharge Date:    Hospital Acct:  [de-identified]   MRN: [de-identified]   CSN: [id number]        [id number]  DK Hospital Account Financial Class: Medicare    November 11, 2017

## (undated) NOTE — IP AVS SNAPSHOT
[facility]            (For Outpatient Use Only) Initial Admit Date: 9/7/2018   Inpt/Obs Admit Date: Inpt: 9/7/18 / Obs: N/A   Discharge Date:    Cornell Payne:  [de-identified]   MRN: [de-identified]   CSN: [id number]        ENCOUNTER  Patient Class: Subscriber Name:  Jw Dotson DOB:    Subscriber ID:  Pt Rel to Subscriber:    Hospital Account Financial Class: Medicare    September 10, 2018

## (undated) NOTE — ED AVS SNAPSHOT
[facility] Emergency Department  [place]  [address] [address]  Phone:  [phone]  Fax:  [phone]          Vashti Milly   MRN: [id number]    Department:  [facility] Emergency Department   Date of Visit:  7/19/2017 IF THERE IS ANY CHANGE OR WORSENING OF YOUR CONDITION, CALL YOUR PRIMARY CARE PHYSICIAN AT ONCE OR RETURN IMMEDIATELY TO THE EMERGENCY DEPARTMENT.     If you have been prescribed any medication(s), please fill your prescription right away and begin taking t